# Patient Record
Sex: MALE | Race: WHITE | NOT HISPANIC OR LATINO | Employment: OTHER | URBAN - METROPOLITAN AREA
[De-identification: names, ages, dates, MRNs, and addresses within clinical notes are randomized per-mention and may not be internally consistent; named-entity substitution may affect disease eponyms.]

---

## 2017-01-09 ENCOUNTER — ANTICOAGULATION MONITORING (OUTPATIENT)
Dept: MEDICAL GROUP | Facility: PHYSICIAN GROUP | Age: 62
End: 2017-01-09
Payer: COMMERCIAL

## 2017-01-09 DIAGNOSIS — Z98.890 S/P MITRAL VALVE REPAIR: ICD-10-CM

## 2017-01-09 LAB — INR PPP: 3.6 (ref 2–3.5)

## 2017-01-09 PROCEDURE — 85610 PROTHROMBIN TIME: CPT | Performed by: NURSE PRACTITIONER

## 2017-01-09 PROCEDURE — 99211 OFF/OP EST MAY X REQ PHY/QHP: CPT | Performed by: NURSE PRACTITIONER

## 2017-01-09 NOTE — PROGRESS NOTES
Anticoagulation Summary as of 1/9/2017     INR goal 2.0-3.0   Selected INR 3.6! (1/9/2017)   Maintenance plan 2 mg (1 mg x 2) on Wed; 3 mg (1 mg x 3) all other days   Weekly total 20 mg   Plan last modified Markell Stanford, LEONA (1/9/2017)   Next INR check 1/20/2017   Target end date Indefinite    Indications   CVA (cerebral vascular accident) (HCC) [I63.9]  S/P mitral valve repair [Z98.890]  Transient cerebral ischemia (Resolved) [G45.9]         Anticoagulation Episode Summary     INR check location Home Draw    Preferred lab     Send INR reminders to     Comments       Anticoagulation Care Providers     Provider Role Specialty Phone number    Dudley Warner M.D. Referring Cardiology [phone]    Clau Carrera PHARMD Responsible          Anticoagulation Patient Findings   Negatives Missed Doses, Extra Doses, Medication Changes, Antibiotic Use, Diet Changes, Dental/Other Procedures, Hospitalization, Bleeding Gums, Nose Bleeds, Blood in Urine, Blood in Stool, Any Bruising, Other Complaints        Patient is supratherapeutic today with INR of 3.6.  Pt denies any unusual s/s of bleeding, bruising, clotting or any changes to diet or medications.  Instructed patient to HOLD tomorrows dose (already took today), then decrease weekly warfarin regimen by ~5% as detailed above.  Follow up in 1.5 weeks.    Markell Stanford, INNAD

## 2017-01-09 NOTE — MR AVS SNAPSHOT
Roque Krishnaler   2017 9:45 AM   Anticoagulation Monitoring   MRN: [id number]    Department:  [facility]   Dept Phone:  [phone]    Description:  Male : 1955   Provider:  Markell Stanford PHARMD           Allergies as of 2017     No Known Allergies      You were diagnosed with     S/P mitral valve repair   [[id number]]         Vital Signs     Smoking Status                   Former Smoker           Basic Information     Date Of Birth Sex Race Ethnicity Preferred Language    1955 Male White Non- English      Your appointments     2017  9:45 AM   Anti-Coag Routine with Markell Stanford PHARMD   [facility]    [address] [address] [phone]            2017  8:45 AM   Anti-Coag Routine with [place] PHARMACIST   [facility]    [address] [id number] [phone]            2017  1:45 PM   FOLLOW UP with Dudley Warner M.D.   [facility] (--)    [address], [address]  [facility] [address] [phone]            Mar 22, 2017  1:20 PM   Follow Up Visit with JUSTINA Corcoran   [facility] (--)    [address], [address]  [facility] [address] [phone]           You will be receiving a confirmation call a few days before your appointment from our automated call confirmation system.              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 Medium  10/17/2013 - Present    Essential and other specified forms of tremor G25.0, G25.2 Medium  10/17/2013 - Present    CHF, acute (HCC) I50.9 High  2015 - Present    LBBB (left bundle branch block) I44.7  High  4/20/2015 - Present    Elevated bilirubin  Medium  4/20/2015 - Present    LV dysfunction I51.9 High  4/22/2015 - Present    TIA (transient ischemic attack) G45.9 Medium  4/22/2015 - Present    Asthma J45.909 Medium  4/22/2015 - Present    CHF (congestive heart failure), NYHA class II ([facility]) I50.9   5/15/2015 - Present    CVA (cerebral vascular accident) ([facility]) I63.9   7/28/2015 - Present    Thrombocytopenia (HCC) D69.6   7/28/2015 - Present    S/P mitral valve repair (Chronic) Z98.890   Unknown - Present    Aphasia, late effect of 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   9/3/2015 - Present    Impaired cognition R41.89   9/14/2015 - Present    Chronic anticoagulation (Chronic) Z79.01   Unknown - Present    Obesity (BMI 30-39.9) E66.9   12/14/2016 - Present      Health Maintenance        Date Due Completion Dates    IMM DTaP/Tdap/Td Vaccine (1 - Tdap) 6/7/1973 ---    IMM ZOSTER VACCINE 6/7/2014 ---    COLONOSCOPY 4/1/2024 4/1/2014            Results     POCT Protime      Component    INR    3.6    Comment:     137 037-11 10/2017 ic valid                        Current Immunizations     Influenza TIV (IM) 3/11/2014    Influenza Vaccine Quad Inj (Pf) 12/14/2016    Influenza Vaccine Quad Inj (Preserved) 11/3/2014    Pneumococcal polysaccharide vaccine (PPSV-23) 1/1/2014      Below and/or attached are the medications your provider expects you to take. Review all of your home medications and newly ordered medications with your provider and/or pharmacist. Follow medication instructions as directed by your provider and/or pharmacist. Please keep your medication list with you and share with your provider. Update the information when medications are discontinued, doses are changed, or new medications (including over-the-counter products) are added; and carry medication information at all times in the event of emergency situations     Allergies:  No Known Allergies          Medications  Valid as of: January 09, 2017 -  8:59 AM     Generic Name Brand Name Tablet Size Instructions for use    Calcium Citrate (Tab) CALCITRATE 950 MG Take 950 mg by mouth every day.        Carvedilol (Tab) COREG 12.5 MG Take 1 Tab by mouth 2 times a day, with meals.        Lacosamide (Tab) VIMPAT 200 MG Take 200 mg by mouth 2 Times a Day.        LevETIRAcetam (Tab) KEPPRA 1000 MG Take 2 Tabs by mouth 2 Times a Day.        Lisinopril (Tab) PRINIVIL 10 MG TAKE ONE TABLET BY MOUTH EVERY DAY        Multiple Vitamin (Tab) THERAGRAN  Take 1 Tab by mouth every day.        Warfarin Sodium (Tab) COUMADIN 1 MG Take three to four (3-4) tablets daily as directed by coumadin clinic        .                 Medicines prescribed today were sent to:     [id number] - [place], [place] - [address]    [address] [place] [address]    Phone: [phone] Fax: [phone]    Open 24 Hours?: No      Medication refill instructions:       If your prescription bottle indicates you have medication refills left, it is not necessary to call your provider’s office. Please contact your pharmacy and they will refill your medication.    If your prescription bottle indicates you do not have any refills left, you may request refills at any time through one of the following ways: The online [employer] system (except Urgent Care), by calling your provider’s office, or by asking your pharmacy to contact your provider’s office with a refill request. Medication refills are processed only during regular business hours and may not be available until the next business day. Your provider may request additional information or to have a follow-up visit with you prior to refilling your medication.   *Please Note: Medication refills are assigned a new Rx number when refilled electronically. Your pharmacy may indicate that no refills were authorized even though a new prescription for the same medication is available at the pharmacy. Please request the medicine by name with the pharmacy before  contacting your provider for a refill.        Warfarin Dosing Calendar   January 2017 Details    Sun Mon Tue Wed Thu Fri Sat     1               2               3               4               5               6               7                 8               9   3.6   3 mg   See details      10      Hold         11      2 mg         12      3 mg         13      3 mg         14      3 mg           15      3 mg         16      3 mg         17      3 mg         18      2 mg         19      3 mg         20      3 mg         21                 22               23               24               25               26               27               28                 29               30               31                    Date Details   01/09 This INR check   INR: 3.6   137 037-11 10/2017 ic valid       Date of next INR:  1/20/2017         How to take your warfarin dose     To take:  2 mg Take 2 of the 1 mg tablets.    To take:  3 mg Take 3 of the 1 mg tablets.    Hold Do not take your warfarin dose. See the Details table to the right for additional instructions.                   Ye[employer] Mobile Access Code: [email or web address]  Expires: 1/12/2017  3:56 PM    Ye[employer] Mobile  A secure, online tool to manage your health information     [employer]’s Yeeply Mobile® is a secure, online tool that connects you to your personalized health information from the privacy of your home -- day or night - making it very easy for you to manage your healthcare. Once the activation process is completed, you can even access your medical information using the Yeep[employer] Mobile juan j, which is available for free in the Apple Juan J store or Google Play store.     [employer] Mobile provides the following levels of access (as shown below):   My Chart Features   [facility] Doctor [facility]  Specialists [facility]  Urgent  Care Non-[facility]  Primary Care  Doctor   Email your healthcare team securely and privately 24/7 X X X    Manage appointments: schedule your next appointment; view details of  past/upcoming appointments X      Request prescription refills. X      View recent personal medical records, including lab and immunizations X X X X   View health record, including health history, allergies, medications X X X X   Read reports about your outpatient visits, procedures, consult and ER notes X X X X   See your discharge summary, which is a recap of your hospital and/or ER visit that includes your diagnosis, lab results, and care plan. X X       How to register for [employer]:  1. Go to  [email or web address].  2. Click on the Sign Up Now box, which takes you to the New Member Sign Up page. You will need to provide the following information:  a. Enter your [employer] Access Code exactly as it appears at the top of this page. (You will not need to use this code after you’ve completed the sign-up process. If you do not sign up before the expiration date, you must request a new code.)   b. Enter your date of birth.   c. Enter your home email address.   d. Click Submit, and follow the next screen’s instructions.  3. Create a Metconnex ID. This will be your Met[employer]x login ID and cannot be changed, so think of one that is secure and easy to remember.  4. Create a [employer] password. You can change your password at any time.  5. Enter your Password Reset Question and Answer. This can be used at a later time if you forget your password.   6. Enter your e-mail address. This allows you to receive e-mail notifications when new information is available in [employer].  7. Click Sign Up. You can now view your health information.    For assistance activating your [employer] account, call [phone]

## 2017-01-20 ENCOUNTER — ANTICOAGULATION VISIT (OUTPATIENT)
Dept: MEDICAL GROUP | Facility: PHYSICIAN GROUP | Age: 62
End: 2017-01-20
Payer: COMMERCIAL

## 2017-01-20 DIAGNOSIS — Z98.890 S/P MITRAL VALVE REPAIR: ICD-10-CM

## 2017-01-20 LAB — INR PPP: 2.2 (ref 2–3.5)

## 2017-01-20 PROCEDURE — 85610 PROTHROMBIN TIME: CPT | Performed by: FAMILY MEDICINE

## 2017-01-20 PROCEDURE — 99211 OFF/OP EST MAY X REQ PHY/QHP: CPT | Performed by: FAMILY MEDICINE

## 2017-01-20 NOTE — MR AVS SNAPSHOT
Roque Nguyen   2017 8:45 AM   Anticoagulation Visit   MRN: [id number]    Department:  [facility]   Dept Phone:  [phone]    Description:  Male : 1955   Provider:  Markell Stanford PHARMD           Allergies as of 2017     No Known Allergies      You were diagnosed with     S/P mitral valve repair   [[id number]]         Vital Signs     Smoking Status                   Former Smoker           Basic Information     Date Of Birth Sex Race Ethnicity Preferred Language    1955 Male White Non- English      Your appointments     2017  8:45 AM   Anti-Coag Routine with Markell Stanford PHARMD   [facility]    [address] [facility] [id number] [phone]            2017  1:45 PM   FOLLOW UP with Dudley Warner M.D.   [facility] (--)    [address], [address]  [facility] [id number] [phone]            2017  8:30 AM   Anti-Coag Routine with [place] PHARMACIST   [facility]    [address] [id number] [phone]            Mar 22, 2017  1:20 PM   Follow Up Visit with JUSTINA Corcoran   [facility] (--)    [address], [address]  [facility] [id number] [phone]           You will be receiving a confirmation call a few days before your appointment from our automated call confirmation system.              Problem List              ICD-10-CM Priority Class Noted - Resolved    Antiphospholipid syndrome (CMS-HCC) D68.61 Medium  3/15/2010 - Present    At risk for osteopenia Z91.89 Low  10/17/2013 - Present    Partial epilepsy with impairment of consciousness (CMS-HCC) G40.209 Medium  10/17/2013 - Present    Essential and other specified forms of tremor G25.0, G25.2 Medium  10/17/2013 - Present    CHF, acute (CMS-[facility]) I50.9 High  2015 - Present    LBBB (left bundle branch block)  I44.7 High  4/20/2015 - Present    Elevated bilirubin  Medium  4/20/2015 - Present    LV dysfunction I51.9 High  4/22/2015 - Present    TIA (transient ischemic attack) G45.9 Medium  4/22/2015 - Present    Asthma J45.909 Medium  4/22/2015 - Present    CHF (congestive heart failure), NYHA class II (CMS-HCC) I50.9   5/15/2015 - Present    CVA (cerebral vascular accident) (CMS-HCC) I63.9   7/28/2015 - Present    Thrombocytopenia (CMS-HCC) D69.6   7/28/2015 - Present    S/P mitral valve repair (Chronic) Z98.890   Unknown - Present    Aphasia, late effect of 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   9/3/2015 - Present    Impaired cognition R41.89   9/14/2015 - Present    Chronic anticoagulation (Chronic) Z79.01   Unknown - Present    Obesity (BMI 30-39.9) E66.9   12/14/2016 - Present      Health Maintenance        Date Due Completion Dates    IMM DTaP/Tdap/Td Vaccine (1 - Tdap) 6/7/1973 ---    IMM ZOSTER VACCINE 6/7/2014 ---    COLONOSCOPY 4/1/2024 4/1/2014            Results     POCT Protime      Component    INR    2.2    Comment:     139 818-12 11/2017 ic valid                        Current Immunizations     Influenza TIV (IM) 3/11/2014    Influenza Vaccine Quad Inj (Pf) 12/14/2016    Influenza Vaccine Quad Inj (Preserved) 11/3/2014    Pneumococcal polysaccharide vaccine (PPSV-23) 1/1/2014      Below and/or attached are the medications your provider expects you to take. Review all of your home medications and newly ordered medications with your provider and/or pharmacist. Follow medication instructions as directed by your provider and/or pharmacist. Please keep your medication list with you and share with your provider. Update the information when medications are discontinued, doses are changed, or new medications (including over-the-counter products) are added; and carry medication information at all times in the event of emergency situations     Allergies:  No Known Allergies          Medications  Valid as of: January 20,  2017 -  8:27 AM    Generic Name Brand Name Tablet Size Instructions for use    Calcium Citrate (Tab) CALCITRATE 950 MG Take 950 mg by mouth every day.        Carvedilol (Tab) COREG 12.5 MG Take 1 Tab by mouth 2 times a day, with meals.        Lacosamide (Tab) VIMPAT 200 MG Take 200 mg by mouth 2 Times a Day.        LevETIRAcetam (Tab) KEPPRA 1000 MG Take 2 Tabs by mouth 2 Times a Day.        Lisinopril (Tab) PRINIVIL 10 MG TAKE ONE TABLET BY MOUTH EVERY DAY        Multiple Vitamin (Tab) THERAGRAN  Take 1 Tab by mouth every day.        Warfarin Sodium (Tab) COUMADIN 1 MG Take three to four (3-4) tablets daily as directed by coumadin clinic        .                 Medicines prescribed today were sent to:     [id number] - [place], [place] - [address]    [address] [facility] [place] [address]    Phone: [phone] Fax: [phone]    Open 24 Hours?: No      Medication refill instructions:       If your prescription bottle indicates you have medication refills left, it is not necessary to call your provider’s office. Please contact your pharmacy and they will refill your medication.    If your prescription bottle indicates you do not have any refills left, you may request refills at any time through one of the following ways: The online [employer] system (except Urgent Care), by calling your provider’s office, or by asking your pharmacy to contact your provider’s office with a refill request. Medication refills are processed only during regular business hours and may not be available until the next business day. Your provider may request additional information or to have a follow-up visit with you prior to refilling your medication.   *Please Note: Medication refills are assigned a new Rx number when refilled electronically. Your pharmacy may indicate that no refills were authorized even though a new prescription for the same medication is available at the pharmacy. Please request the medicine by name with the  pharmacy before contacting your provider for a refill.        Warfarin Dosing Calendar   January 2017 Details    Sun Mon Tue Wed Thu Fri Sat     1               2               3               4               5               6               7                 8               9               10               11               12               13               14                 15               16               17               18               19               20   2.2   3 mg   See details      21      3 mg           22      3 mg         23      3 mg         24      3 mg         25      2 mg         26      3 mg         27      3 mg         28      3 mg           29      3 mg         30      3 mg         31      3 mg              Date Details   01/20 This INR check   INR: 2.2   139 818-12 11/2017 ic valid               How to take your warfarin dose     To take:  2 mg Take 2 of the 1 mg tablets.    To take:  3 mg Take 3 of the 1 mg tablets.           Warfarin Dosing Calendar   February 2017 Details    Sun Mon Tue Wed Thu Fri Sat        1      2 mg         2      3 mg         3      3 mg         4      3 mg           5      3 mg         6      3 mg         7      3 mg         8      2 mg         9      3 mg         10      3 mg         11      3 mg           12      3 mg         13      3 mg         14      3 mg         15      2 mg         16      3 mg         17      3 mg         18                 19               20               21               22               23               24               25                 26               27               28                    Date Details   No additional details    Date of next INR:  2/17/2017         How to take your warfarin dose     To take:  2 mg Take 2 of the 1 mg tablets.    To take:  3 mg Take 3 of the 1 mg tablets.              [employer] Access Code: [email or web address]  Expires: 2/19/2017  8:27 AM    [employer]  A secure, online tool to manage your health information        [employer]’s Ruci.cn® is a secure, online tool that connects you to your personalized health information from the privacy of your home -- day or night - making it very easy for you to manage your healthcare. Once the activation process is completed, you can even access your medical information using the Ruci.cn juan j, which is available for free in the Apple Juan J store or Google Play store.     Ruci.cn provides the following levels of access (as shown below):   My Chart Features   [facility] Primary [facility] Doctor [facility]  Specialists [facility]  Urgent  Care Non-[facility]  Primary Care  Doctor   Email your healthcare team securely and privately 24/7 X X X    Manage appointments: schedule your next appointment; view details of past/upcoming appointments X      Request prescription refills. X      View recent personal medical records, including lab and immunizations X X X X   View health record, including health history, allergies, medications X X X X   Read reports about your outpatient visits, procedures, consult and ER notes X X X X   See your discharge summary, which is a recap of your hospital and/or ER visit that includes your diagnosis, lab results, and care plan. X X       How to register for [email or web address].cn:  1. Go to  [email or web address].  2. Click on the Sign Up Now box, which takes you to the New Member Sign Up page. You will need to provide the following information:  a. Enter your R[employer]cn Access Code exactly as it appears at the top of this page. (You will not need to use this code after you’ve completed the sign-up process. If you do not sign up before the expiration date, you must request a new code.)   b. Enter your date of birth.   c. Enter your home email address.   d. Click Submit, and follow the next screen’s instructions.  3. Create a Ruci.cn ID. This will be your R[employer]cn login ID and cannot be changed, so think of one that is secure and easy to remember.  4. Create a [employer]cn password. You can change your  password at any time.  5. Enter your Password Reset Question and Answer. This can be used at a later time if you forget your password.   6. Enter your e-mail address. This allows you to receive e-mail notifications when new information is available in [employer].  7. Click Sign Up. You can now view your health information.    For assistance activating your [employer] account, call [phone]

## 2017-01-20 NOTE — PROGRESS NOTES
Anticoagulation Summary as of 1/20/2017     INR goal 2.0-3.0   Selected INR 2.2 (1/20/2017)   Maintenance plan 2 mg (1 mg x 2) on Wed; 3 mg (1 mg x 3) all other days   Weekly total 20 mg   Plan last modified Markell Stanford PHARMD (1/9/2017)   Next INR check 2/17/2017   Target end date Indefinite    Indications   CVA (cerebral vascular accident) (CMS-HCC) [I63.9]  S/P mitral valve repair [Z98.890]  Transient cerebral ischemia (Resolved) [G45.9]         Anticoagulation Episode Summary     INR check location Home Draw    Preferred lab     Send INR reminders to     Comments       Anticoagulation Care Providers     Provider Role Specialty Phone number    Dudley Warner M.D. Referring Cardiology [phone]    Clau Carrera PHARMD Responsible          Anticoagulation Patient Findings   Negatives Missed Doses, Extra Doses, Medication Changes, Antibiotic Use, Diet Changes, Dental/Other Procedures, Hospitalization, Bleeding Gums, Nose Bleeds, Blood in Urine, Blood in Stool, Any Bruising, Other Complaints        Patient is therapeutic today with INR of 2.2.  Pt denies any unusual s/s of bleeding, bruising, clotting or any changes to diet or medications.  Pt is to continue with current warfarin dosing regimen.  Follow up in 4 weeks.    Markell Stanford, LEONA

## 2017-02-07 ENCOUNTER — OFFICE VISIT (OUTPATIENT)
Dept: CARDIOLOGY | Facility: MEDICAL CENTER | Age: 62
End: 2017-02-07
Payer: COMMERCIAL

## 2017-02-07 VITALS
SYSTOLIC BLOOD PRESSURE: 100 MMHG | HEART RATE: 90 BPM | BODY MASS INDEX: 31.82 KG/M2 | DIASTOLIC BLOOD PRESSURE: 70 MMHG | WEIGHT: 198 LBS | OXYGEN SATURATION: 96 % | HEIGHT: 66 IN

## 2017-02-07 DIAGNOSIS — I50.9 CONGESTIVE HEART FAILURE, UNSPECIFIED CONGESTIVE HEART FAILURE CHRONICITY, UNSPECIFIED CONGESTIVE HEART FAILURE TYPE: ICD-10-CM

## 2017-02-07 DIAGNOSIS — I51.9 LV DYSFUNCTION: ICD-10-CM

## 2017-02-07 DIAGNOSIS — I69.920 APHASIA, LATE EFFECT OF CEREBROVASCULAR DISEASE: ICD-10-CM

## 2017-02-07 DIAGNOSIS — Z79.01 CHRONIC ANTICOAGULATION: Chronic | ICD-10-CM

## 2017-02-07 DIAGNOSIS — I50.22 CHF (CONGESTIVE HEART FAILURE), NYHA CLASS II, CHRONIC, SYSTOLIC (HCC): ICD-10-CM

## 2017-02-07 DIAGNOSIS — D68.61 ANTIPHOSPHOLIPID SYNDROME (HCC): ICD-10-CM

## 2017-02-07 PROCEDURE — 99214 OFFICE O/P EST MOD 30 MIN: CPT | Performed by: INTERNAL MEDICINE

## 2017-02-07 RX ORDER — LISINOPRIL 10 MG/1
10 TABLET ORAL
Qty: 90 TAB | Refills: 3 | Status: SHIPPED | OUTPATIENT
Start: 2017-02-07 | End: 2018-02-14 | Stop reason: SDUPTHER

## 2017-02-07 RX ORDER — CARVEDILOL 12.5 MG/1
12.5 TABLET ORAL 2 TIMES DAILY WITH MEALS
Qty: 180 TAB | Refills: 3 | Status: SHIPPED | OUTPATIENT
Start: 2017-02-07 | End: 2018-02-14 | Stop reason: SDUPTHER

## 2017-02-07 NOTE — MR AVS SNAPSHOT
"        Roque Nguyen   2017 1:45 PM   Office Visit   MRN: [id number]    Department:  [facility]   Dept Phone:  [phone]    Description:  Male : 1955   Provider:  Dudley Warner M.D.           Reason for Visit     Follow-Up           Allergies as of 2017     No Known Allergies      You were diagnosed with     Aphasia, late effect of cerebrovascular disease   [438.11.ICD-9-CM]       Antiphospholipid syndrome (CMS-[facility])   [254918]       CHF (congestive heart failure), NYHA class II, chronic, systolic (CMS-[facility])   [8714440]       Congestive heart failure, unspecified congestive heart failure chronicity, unspecified congestive heart failure type (CMS-[facility])   [0981534]       LV dysfunction   [326781]         Vital Signs     Blood Pressure Pulse Height Weight Body Mass Index Oxygen Saturation    100/70 mmHg 90 1.676 m (5' 5.98\") 89.812 kg (198 lb) 31.97 kg/m2 96%    Smoking Status                   Former Smoker           Basic Information     Date Of Birth Sex Race Ethnicity Preferred Language    1955 Male White Non- English      Your appointments     2017  8:30 AM   Anti-Coag Routine with [place] PHARMACIST   [facility]    [address] [place] [id number] [phone]            Mar 22, 2017  1:20 PM   Follow Up Visit with JUSTINA Corcoran   [facility] (--)    [address] [place] [address], [address]  [facility] [address] [phone]           You will be receiving a confirmation call a few days before your appointment from our automated call confirmation system.              Problem List              ICD-10-CM Priority Class Noted - Resolved    Antiphospholipid syndrome (CMS-HCC) D68.61 Medium  3/15/2010 - Present    At risk for osteopenia Z91.89 Low  10/17/2013 - Present    Partial epilepsy with impairment of consciousness (CMS-HCC) G40.209 Medium  10/17/2013 - Present    Essential and other specified " forms of tremor G25.0, G25.2 Medium  10/17/2013 - Present    CHF, acute (CMS-HCC) I50.9 High  4/20/2015 - Present    LBBB (left bundle branch block) I44.7 High  4/20/2015 - Present    Elevated bilirubin  Medium  4/20/2015 - Present    LV dysfunction I51.9 High  4/22/2015 - Present    TIA (transient ischemic attack) G45.9 Medium  4/22/2015 - Present    Asthma J45.909 Medium  4/22/2015 - Present    CHF (congestive heart failure), NYHA class II (CMS-HCC) I50.9   5/15/2015 - Present    CVA (cerebral vascular accident) (CMS-HCC) I63.9   7/28/2015 - Present    Thrombocytopenia (CMS-HCC) D69.6   7/28/2015 - Present    S/P mitral valve repair (Chronic) Z98.890   Unknown - Present    Aphasia, late effect of 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   9/3/2015 - Present    Impaired cognition R41.89   9/14/2015 - Present    Chronic anticoagulation (Chronic) Z79.01   Unknown - Present    Obesity (BMI 30-39.9) E66.9   12/14/2016 - Present      Health Maintenance        Date Due Completion Dates    IMM DTaP/Tdap/Td Vaccine (1 - Tdap) 6/7/1973 ---    IMM ZOSTER VACCINE 6/7/2014 ---    COLONOSCOPY 4/1/2024 4/1/2014            Current Immunizations     Influenza TIV (IM) 3/11/2014    Influenza Vaccine Quad Inj (Pf) 12/14/2016    Influenza Vaccine Quad Inj (Preserved) 11/3/2014    Pneumococcal polysaccharide vaccine (PPSV-23) 1/1/2014      Below and/or attached are the medications your provider expects you to take. Review all of your home medications and newly ordered medications with your provider and/or pharmacist. Follow medication instructions as directed by your provider and/or pharmacist. Please keep your medication list with you and share with your provider. Update the information when medications are discontinued, doses are changed, or new medications (including over-the-counter products) are added; and carry medication information at all times in the event of emergency situations     Allergies:  No Known Allergies          Medications   Valid as of: February 07, 2017 -  2:37 PM    Generic Name Brand Name Tablet Size Instructions for use    Calcium Citrate (Tab) CALCITRATE 950 MG Take 950 mg by mouth every day.        Carvedilol (Tab) COREG 12.5 MG Take 1 Tab by mouth 2 times a day, with meals.        Lacosamide (Tab) VIMPAT 200 MG Take 200 mg by mouth 2 Times a Day.        LevETIRAcetam (Tab) KEPPRA 1000 MG Take 2 Tabs by mouth 2 Times a Day.        Lisinopril (Tab) PRINIVIL 10 MG Take 1 Tab by mouth every day.        Multiple Vitamin (Tab) THERAGRAN  Take 1 Tab by mouth every day.        Warfarin Sodium (Tab) COUMADIN 1 MG Take three to four (3-4) tablets daily as directed by coumadin clinic        .                 Medicines prescribed today were sent to:     [id number] - [place], [place] - [address]    [address] [place] [address]    Phone: [phone] Fax: [phone]    Open 24 Hours?: No      Medication refill instructions:       If your prescription bottle indicates you have medication refills left, it is not necessary to call your provider’s office. Please contact your pharmacy and they will refill your medication.    If your prescription bottle indicates you do not have any refills left, you may request refills at any time through one of the following ways: The online [employer] system (except Urgent Care), by calling your provider’s office, or by asking your pharmacy to contact your provider’s office with a refill request. Medication refills are processed only during regular business hours and may not be available until the next business day. Your provider may request additional information or to have a follow-up visit with you prior to refilling your medication.   *Please Note: Medication refills are assigned a new Rx number when refilled electronically. Your pharmacy may indicate that no refills were authorized even though a new prescription for the same medication is available at the pharmacy. Please request the  medicine by name with the pharmacy before contacting your provider for a refill.           [employer] Access Code: [email or web address]  Expires: 2/19/2017  8:27 AM    [employer]  A secure, online tool to manage your health information     [employer]’s [employer]® is a secure, online tool that connects you to your personalized health information from the privacy of your home -- day or night - making it very easy for you to manage your healthcare. Once the activation process is completed, you can even access your medical information using the [employer] juan j, which is available for free in the Apple Juan J store or Google Play store.     [employer] provides the following levels of access (as shown below):   My Chart Features   [facility] Primary Care Doctor [facility]  Specialists [facility]  Urgent  Care Non-[facility]  Primary Care  Doctor   Email your healthcare team securely and privately 24/7 X X X    Manage appointments: schedule your next appointment; view details of past/upcoming appointments X      Request prescription refills. X      View recent personal medical records, including lab and immunizations X X X X   View health record, including health history, allergies, medications X X X X   Read reports about your outpatient visits, procedures, consult and ER notes X X X X   See your discharge summary, which is a recap of your hospital and/or ER visit that includes your diagnosis, lab results, and care plan. X X       How to register for [employer]:  1. Go to  [email or web address].  2. Click on the Sign Up Now box, which takes you to the New Member Sign Up page. You will need to provide the following information:  a. Enter your [employer] Access Code exactly as it appears at the top of this page. (You will not need to use this code after you’ve completed the sign-up process. If you do not sign up before the expiration date, you must request a new code.)   b. Enter your date of birth.   c. Enter your home email address.   d. Click Submit, and  follow the next screen’s instructions.  3. Create a [employer] ID. This will be your [employer] login ID and cannot be changed, so think of one that is secure and easy to remember.  4. Create a [employer] password. You can change your password at any time.  5. Enter your Password Reset Question and Answer. This can be used at a later time if you forget your password.   6. Enter your e-mail address. This allows you to receive e-mail notifications when new information is available in [employer].  7. Click Sign Up. You can now view your health information.    For assistance activating your [employer] account, call [phone]

## 2017-02-15 ENCOUNTER — TELEPHONE (OUTPATIENT)
Dept: NEUROLOGY | Facility: MEDICAL CENTER | Age: 62
End: 2017-02-15

## 2017-02-15 NOTE — TELEPHONE ENCOUNTER
Go ahead taking current dosing of Keppra as long as he is feeling well.  How has this differentiation been noticed?

## 2017-02-15 NOTE — TELEPHONE ENCOUNTER
Called and spoke with pt. All information was given and pt and she verbally understood and will comply with all given. BLR  No seizures and patient has been feeling great!

## 2017-02-15 NOTE — TELEPHONE ENCOUNTER
Message: Patient has been taking LEV 1000mg BID. Our notes states that the patient is supposed to take LEV 2000mg BID. Should the patient continue 1000mg BID or should he increase to 2000mg BID?    Caller: Gabi  Call Back #: [phone]  OK to leave detailed message: yes

## 2017-02-16 ASSESSMENT — ENCOUNTER SYMPTOMS
HEADACHES: 0
PALPITATIONS: 0
CHILLS: 0
NAUSEA: 0
BRUISES/BLEEDS EASILY: 0
COUGH: 0
PND: 0
FOCAL WEAKNESS: 0
SORE THROAT: 0
DIZZINESS: 0
BLURRED VISION: 0
WEAKNESS: 0
FEVER: 0
CLAUDICATION: 0
SHORTNESS OF BREATH: 0
ABDOMINAL PAIN: 0
FALLS: 0
LOSS OF CONSCIOUSNESS: 0

## 2017-02-17 ENCOUNTER — ANTICOAGULATION VISIT (OUTPATIENT)
Dept: MEDICAL GROUP | Facility: PHYSICIAN GROUP | Age: 62
End: 2017-02-17
Payer: COMMERCIAL

## 2017-02-17 DIAGNOSIS — I63.9 CEREBROVASCULAR ACCIDENT (CVA), UNSPECIFIED MECHANISM (HCC): ICD-10-CM

## 2017-02-17 DIAGNOSIS — Z98.890 S/P MITRAL VALVE REPAIR: ICD-10-CM

## 2017-02-17 LAB — INR PPP: 2.1 (ref 2–3.5)

## 2017-02-17 PROCEDURE — 99211 OFF/OP EST MAY X REQ PHY/QHP: CPT | Performed by: FAMILY MEDICINE

## 2017-02-17 PROCEDURE — 85610 PROTHROMBIN TIME: CPT | Performed by: FAMILY MEDICINE

## 2017-02-17 NOTE — PROGRESS NOTES
Anticoagulation Summary as of 2/17/2017     INR goal 2.0-3.0   Selected INR 2.1 (2/17/2017)   Maintenance plan 2 mg (1 mg x 2) on Wed; 3 mg (1 mg x 3) all other days   Weekly total 20 mg   Plan last modified Markell Stanford PHARMD (1/9/2017)   Next INR check 3/31/2017   Target end date Indefinite    Indications   CVA (cerebral vascular accident) (CMS-HCC) [I63.9]  S/P mitral valve repair [Z98.890]  Transient cerebral ischemia (Resolved) [G45.9]         Anticoagulation Episode Summary     INR check location Home Draw    Preferred lab     Send INR reminders to     Comments       Anticoagulation Care Providers     Provider Role Specialty Phone number    Dudley Warner M.D. Referring Cardiology [phone]    Clau Carrera PHARMD Responsible          Anticoagulation Patient Findings   Negatives Missed Doses, Extra Doses, Medication Changes, Antibiotic Use, Diet Changes, Dental/Other Procedures, Hospitalization, Bleeding Gums, Nose Bleeds, Blood in Urine, Blood in Stool, Any Bruising, Other Complaints        Patient is therapeutic today with INR of 2.1.  Pt denies any unusual s/s of bleeding, bruising, clotting or any changes to diet or medications.  Pt is to continue with current warfarin dosing regimen.  Follow up in 6 weeks.    Markell Stanford, LEONA

## 2017-02-17 NOTE — PROGRESS NOTES
Subjective:   Roque Nguyen is a 61 y.o. male who presents today for follow-up of his history of mitral valve repair which was complicated by postoperative stroke with antiphospholipid antibody and thrombocytopenia    He's been doing well    Unfortunately his wife was found to have colorectal cancer and has had quite a bit of surgeries but certainly his cause distress she is normally his [place] for his own health and so has been difficult to see her go through all the issues she has    Past Medical History   Diagnosis Date   • Antiphospholipid antibody syndrome (CMS-HCC) 2007     Followed by rheumatology   • TIA (transient ischemic attack) 2007/2008     Antiphospholipid antibody   • Dental disorder      Broken   • ASTHMA    • Clotting disorder (CMS-HCC)    • LV dysfunction April 2015     Echocardiogram with normal LV size, LVEF 40%. Severely dilated LA. Severe MR, mild TR. RVSP 45mmHg   • LBBB (left bundle branch block)    • Chest pain April 2015     Coronary angiogram with 20% stenosis of proximal RCA, otherwise normal coronary arteries; LVEF 35-40%.   • Mitral regurgitation April 2015     Echocardiogram with severe MR   • Tremors of nervous system      mostly left sided   • Breath shortness 4/21/15     being worked up for cardiac issue, pt states not an issue right now   • Snoring    • Seizure disorder (CMS-HCC) 2007     Initial diagnosis, last seizure in 2012; followed by neurology, on Keppra.   • TIA 2007/2008   • Personal history of venous thrombosis and embolism      anti phosphorus lipid syndrome.   • Cancer (CMS-HCC) 2014     Prostate cancer   • Hypertension      On Lisinopril   • S/P mitral valve repair    • Chronic anticoagulation      Past Surgical History   Procedure Laterality Date   • Inguinal hernia repair  4/20/2010     Performed by ROQUE STILL at SURGERY SAME DAY [facility] ORS   • Cholecystectomy     • Recovery  5/15/2015     Procedure: [id number]  MITRAL REGURGITATION;   Surgeon: Ir-Recovery Surgery;  Location: SURGERY SAME DAY [facility] ORS;  Service:    • Mitral valve repair  7/20/2015     Procedure: MITRAL VALVE REPAIR  with MICHAEL;  Surgeon: Gina Sim M.D.;  Location: SURGERY [facility];  Service:      Family History   Problem Relation Age of Onset   • Cancer Paternal Uncle    • Stroke Neg Hx    • Lung Disease Mother    • Heart Disease Mother    • Hypertension Mother    • Diabetes Father    • Heart Disease Father    • Psychiatry Sister    • Hypertension Sister    • Lung Disease Paternal Aunt    • Hypertension Paternal Aunt    • Psychiatry Maternal Grandfather      History   Smoking status   • Former Smoker -- 0.50 packs/day   • Types: Cigarettes   • Start date: 07/29/1985   • Quit date: 07/29/2012   Smokeless tobacco   • Never Used     Comment: 1 pk a day for 20 plus yrs     No Known Allergies  Outpatient Encounter Prescriptions as of 2/7/2017   Medication Sig Dispense Refill   • lisinopril (PRINIVIL) 10 MG Tab Take 1 Tab by mouth every day. 90 Tab 3   • carvedilol (COREG) 12.5 MG Tab Take 1 Tab by mouth 2 times a day, with meals. 180 Tab 3   • warfarin (COUMADIN) 1 MG Tab Take three to four (3-4) tablets daily as directed by coumadin clinic 360 Tab 1   • levetiracetam (KEPPRA) 1000 MG tablet Take 2 Tabs by mouth 2 Times a Day. 120 Tab 11   • lacosamide (VIMPAT) 200 MG Tab tablet Take 200 mg by mouth 2 Times a Day. 60 Tab 11   • multivitamin (THERAGRAN) Tab Take 1 Tab by mouth every day.     • calcium citrate (CALCITRATE) 950 MG Tab Take 950 mg by mouth every day.     • [DISCONTINUED] lisinopril (PRINIVIL) 10 MG Tab TAKE ONE TABLET BY MOUTH EVERY DAY 90 Tab 1   • [DISCONTINUED] carvedilol (COREG) 12.5 MG Tab Take 1 Tab by mouth 2 times a day, with meals. 180 Tab 3     No facility-administered encounter medications on file as of 2/7/2017.     Review of Systems   Constitutional: Negative for fever and chills.   HENT: Negative for sore throat.    Eyes: Negative for blurred  "vision.   Respiratory: Negative for cough and shortness of breath.    Cardiovascular: Negative for chest pain, palpitations, claudication, leg swelling and PND.   Gastrointestinal: Negative for nausea and abdominal pain.   Musculoskeletal: Negative for falls.   Skin: Negative for rash.   Neurological: Negative for dizziness, focal weakness, loss of consciousness, weakness and headaches.   Endo/Heme/Allergies: Does not bruise/bleed easily.        Objective:   /70 mmHg  Pulse 90  Ht 1.676 m (5' 5.98\")  Wt 89.812 kg (198 lb)  BMI 31.97 kg/m2  SpO2 96%    Physical Exam   Constitutional: No distress.   HENT:   Mouth/Throat: Oropharynx is clear and moist.   Eyes: No scleral icterus.   Neck: Neck supple. No JVD present.   Cardiovascular: Normal rate, regular rhythm, normal heart sounds and intact distal pulses.  Exam reveals no gallop and no friction rub.    No murmur heard.  Pulmonary/Chest: Effort normal. He has no rales.   Abdominal: Soft. Bowel sounds are normal. There is no tenderness.   Musculoskeletal: He exhibits no edema.   Neurological: He is alert.   Skin: No rash noted. He is not diaphoretic.   Psychiatric: He has a normal mood and affect.       Assessment:     1. Aphasia, late effect of cerebrovascular disease     2. Antiphospholipid syndrome (CMS-[facility])     3. CHF (congestive heart failure), NYHA class II, chronic, systolic (CMS-[facility])     4. Congestive heart failure, unspecified congestive heart failure chronicity, unspecified congestive heart failure type (CMS-[facility])  lisinopril (PRINIVIL) 10 MG Tab   5. LV dysfunction  carvedilol (COREG) 12.5 MG Tab   6. Chronic anticoagulation         Medical Decision Making:  Today's Assessment / Status / Plan:     It was my pleasure to meet with Mr. Nguyen.    He is doing well on chronic anticoagulations    Blood pressure remains excellent    Overall his energy has remained stable after mitral valve surgery    I will see Mr. Nguyen back in 1 year time and " encouraged him to follow up with us over the phone or e-mail using my MyChart as issues arise.    It is my pleasure to participate in the care of Mr. Nguyen.  Please do not hesitate to contact me with questions or concerns.    Dudley Warner MD PhD FAC  Cardiologist [facility]

## 2017-02-17 NOTE — MR AVS SNAPSHOT
Roque Nguyen   2017 8:30 AM   Anticoagulation Visit   MRN: [id number]    Department:  [facility]   Dept Phone:  [phone]    Description:  Male : 1955   Provider:  Markell Stanford PHARMD           Allergies as of 2017     No Known Allergies      You were diagnosed with     S/P mitral valve repair   [340865]       Cerebrovascular accident (CVA), unspecified mechanism (CMS-[facility])   [[id number]]         Vital Signs     Smoking Status                   Former Smoker           Basic Information     Date Of Birth Sex Race Ethnicity Preferred Language    1955 Male White Non- English      Your appointments     2017  8:30 AM   Anti-Coag Routine with Markell Stanford PHARMD   [address] [phone]            Mar 22, 2017  1:20 PM   Follow Up Visit with JUSTINA Corcoran   [facility] (--)    [address] [place] [address], [address]  [facility] [id number] [phone]           You will be receiving a confirmation call a few days before your appointment from our automated call confirmation system.            Mar 31, 2017  8:45 AM   Anti-Coag Routine with [place] PHARMACIST   [facility] [place] [phone]              Problem List              ICD-10-CM Priority Class Noted - Resolved    Antiphospholipid syndrome (CMS-HCC) D68.61 Medium  3/15/2010 - Present    At risk for osteopenia Z91.89 Low  10/17/2013 - Present    Partial epilepsy with impairment of consciousness (CMS-HCC) G40.209 Medium  10/17/2013 - Present    Essential and other specified forms of tremor G25.0, G25.2 Medium  10/17/2013 - Present    LBBB (left bundle branch block) I44.7 High  2015 - Present    Elevated bilirubin  Medium  2015 - Present    LV dysfunction I51.9 High  2015 - Present    TIA (transient ischemic attack)  G45.9 Medium  4/22/2015 - Present    Asthma J45.909 Medium  4/22/2015 - Present    CHF (congestive heart failure), NYHA class II (CMS-HCC) I50.9   5/15/2015 - Present    CVA (cerebral vascular accident) (CMS-HCC) I63.9   7/28/2015 - Present    Thrombocytopenia (CMS-HCC) D69.6   7/28/2015 - Present    S/P mitral valve repair (Chronic) Z98.890   Unknown - Present    Aphasia, late effect of 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   9/3/2015 - Present    Impaired cognition R41.89   9/14/2015 - Present    Chronic anticoagulation (Chronic) Z79.01   Unknown - Present    Obesity (BMI 30-39.9) E66.9   12/14/2016 - Present      Health Maintenance        Date Due Completion Dates    IMM DTaP/Tdap/Td Vaccine (1 - Tdap) 6/7/1973 ---    IMM ZOSTER VACCINE 6/7/2014 ---    COLONOSCOPY 4/1/2024 4/1/2014            Results     POCT Protime      Component    INR    2.1    Comment:     144 580-11 1/2018 ic valid                        Current Immunizations     Influenza TIV (IM) 3/11/2014    Influenza Vaccine Quad Inj (Pf) 12/14/2016    Influenza Vaccine Quad Inj (Preserved) 11/3/2014    Pneumococcal polysaccharide vaccine (PPSV-23) 1/1/2014      Below and/or attached are the medications your provider expects you to take. Review all of your home medications and newly ordered medications with your provider and/or pharmacist. Follow medication instructions as directed by your provider and/or pharmacist. Please keep your medication list with you and share with your provider. Update the information when medications are discontinued, doses are changed, or new medications (including over-the-counter products) are added; and carry medication information at all times in the event of emergency situations     Allergies:  No Known Allergies          Medications  Valid as of: February 17, 2017 -  8:27 AM    Generic Name Brand Name Tablet Size Instructions for use    Calcium Citrate (Tab) CALCITRATE 950 MG Take 950 mg by mouth every day.        Carvedilol  (Tab) COREG 12.5 MG Take 1 Tab by mouth 2 times a day, with meals.        Lacosamide (Tab) VIMPAT 200 MG Take 200 mg by mouth 2 Times a Day.        LevETIRAcetam (Tab) KEPPRA 1000 MG Take 2 Tabs by mouth 2 Times a Day.        Lisinopril (Tab) PRINIVIL 10 MG Take 1 Tab by mouth every day.        Multiple Vitamin (Tab) THERAGRAN  Take 1 Tab by mouth every day.        Warfarin Sodium (Tab) COUMADIN 1 MG Take three to four (3-4) tablets daily as directed by coumadin clinic        .                 Medicines prescribed today were sent to:     [id number] - [place], [place] - [address]    [address] [place] [address]    Phone: [phone] Fax: [phone]    Open 24 Hours?: No      Medication refill instructions:       If your prescription bottle indicates you have medication refills left, it is not necessary to call your provider’s office. Please contact your pharmacy and they will refill your medication.    If your prescription bottle indicates you do not have any refills left, you may request refills at any time through one of the following ways: The online [employer] system (except Urgent Care), by calling your provider’s office, or by asking your pharmacy to contact your provider’s office with a refill request. Medication refills are processed only during regular business hours and may not be available until the next business day. Your provider may request additional information or to have a follow-up visit with you prior to refilling your medication.   *Please Note: Medication refills are assigned a new Rx number when refilled electronically. Your pharmacy may indicate that no refills were authorized even though a new prescription for the same medication is available at the pharmacy. Please request the medicine by name with the pharmacy before contacting your provider for a refill.        Warfarin Dosing Calendar   February 2017 Details    Sun Mon Tue Wed Thu Fri Sat        1               2                3               4                 5               6               7               8               9               10               11                 12               13               14               15               16               17   2.1   3 mg   See details      18      3 mg           19      3 mg         20      3 mg         21      3 mg         22      2 mg         23      3 mg         24      3 mg         25      3 mg           26      3 mg         27      3 mg         28      3 mg              Date Details   02/17 This INR check   INR: 2.1   144 580-11 1/2018 ic valid               How to take your warfarin dose     To take:  2 mg Take 2 of the 1 mg tablets.    To take:  3 mg Take 3 of the 1 mg tablets.           Warfarin Dosing Calendar   March 2017 Details    Sun Mon Tue Wed Thu Fri Sat        1      2 mg         2      3 mg         3      3 mg         4      3 mg           5      3 mg         6      3 mg         7      3 mg         8      2 mg         9      3 mg         10      3 mg         11      3 mg           12      3 mg         13      3 mg         14      3 mg         15      2 mg         16      3 mg         17      3 mg         18      3 mg           19      3 mg         20      3 mg         21      3 mg         22      2 mg         23      3 mg         24      3 mg         25      3 mg           26      3 mg         27      3 mg         28      3 mg         29      2 mg         30      3 mg         31      3 mg           Date Details   No additional details    Date of next INR:  3/31/2017         How to take your warfarin dose     To take:  2 mg Take 2 of the 1 mg tablets.    To take:  3 mg Take 3 of the 1 mg tablets.              [employer] Access Code: [email or web address]  Expires: 2/19/2017  8:27 AM    [employer]  A secure, online tool to manage your health information     Medication Reviews [employer]® is a secure, online tool that connects you to your personalized health information from the  privacy of your home -- day or night - making it very easy for you to manage your healthcare. Once the activation process is completed, you can even access your medical information using the Portable Scores juan j, which is available for free in the Apple Juan J store or Google Play store.     Portable Scores provides the following levels of access (as shown below):   My Chart Features   [facility] Primary [facility] Doctor [facility]  Specialists [facility]  Urgent  Care Non-[facility]  Primary Care  Doctor   Email your healthcare team securely and privately 24/7 X X X    Manage appointments: schedule your next appointment; view details of past/upcoming appointments X      Request prescription refills. X      View recent personal medical records, including lab and immunizations X X X X   View health record, including health history, allergies, medications X X X X   Read reports about your outpatient visits, procedures, consult and ER notes X X X X   See your discharge summary, which is a recap of your hospital and/or ER visit that includes your diagnosis, lab results, and care plan. X X       How to register for Portable Scores:  1. Go to  [email or web address].  2. Click on the Sign Up Now box, which takes you to the New Member Sign Up page. You will need to provide the following information:  a. Enter your Portable Scores Access Code exactly as it appears at the top of this page. (You will not need to use this code after you’ve completed the sign-up process. If you do not sign up before the expiration date, you must request a new code.)   b. Enter your date of birth.   c. Enter your home email address.   d. Click Submit, and follow the next screen’s instructions.  3. Create a Portable Scores ID. This will be your Portable Scores login ID and cannot be changed, so think of one that is secure and easy to remember.  4. Create a Portable Scores password. You can change your password at any time.  5. Enter your Password Reset Question and Answer. This can be used at a later time if you forget  your password.   6. Enter your e-mail address. This allows you to receive e-mail notifications when new information is available in [employer].  7. Click Sign Up. You can now view your health information.    For assistance activating your [employer] account, call [phone]

## 2017-03-22 ENCOUNTER — OFFICE VISIT (OUTPATIENT)
Dept: NEUROLOGY | Facility: MEDICAL CENTER | Age: 62
End: 2017-03-22
Payer: COMMERCIAL

## 2017-03-22 VITALS
WEIGHT: 197.8 LBS | HEIGHT: 66 IN | OXYGEN SATURATION: 94 % | DIASTOLIC BLOOD PRESSURE: 72 MMHG | HEART RATE: 66 BPM | RESPIRATION RATE: 16 BRPM | SYSTOLIC BLOOD PRESSURE: 114 MMHG | TEMPERATURE: 97.7 F | BODY MASS INDEX: 31.79 KG/M2

## 2017-03-22 DIAGNOSIS — Z91.89 AT RISK FOR OSTEOPENIA: ICD-10-CM

## 2017-03-22 DIAGNOSIS — G45.0 VERTEBROBASILAR ARTERY SYNDROME: ICD-10-CM

## 2017-03-22 DIAGNOSIS — I63.9 CEREBROVASCULAR ACCIDENT (CVA), UNSPECIFIED MECHANISM (HCC): ICD-10-CM

## 2017-03-22 DIAGNOSIS — G40.009 PARTIAL IDIOPATHIC EPILEPSY WITH SEIZURES OF LOCALIZED ONSET, NOT INTRACTABLE, WITHOUT STATUS EPILEPTICUS (HCC): ICD-10-CM

## 2017-03-22 PROCEDURE — 99214 OFFICE O/P EST MOD 30 MIN: CPT | Performed by: NURSE PRACTITIONER

## 2017-03-22 ASSESSMENT — ENCOUNTER SYMPTOMS
MUSCULOSKELETAL NEGATIVE: 1
SORE THROAT: 0
SEIZURES: 0
COUGH: 0
NERVOUS/ANXIOUS: 0
DEPRESSION: 0
HEADACHES: 0
VOMITING: 0
DIARRHEA: 0
MEMORY LOSS: 1
DOUBLE VISION: 0
NAUSEA: 0
ABDOMINAL PAIN: 0
DIZZINESS: 0
CONSTITUTIONAL NEGATIVE: 1

## 2017-03-22 NOTE — PROGRESS NOTES
Subjective:      Roque Nguyen is a 61 y.o. male who presents with Follow-Up for Localization-related epilepsy.    Here by himself today.    HPI  No concern for seizures.  Last known seizure was March 2012.    He has had no concern for memory lapse, loss of awareness, or significant forgetfulness or expressive aphasia.    He has no real health concerns except he is trying to lose weight.  He has been busy at home, active.  He is trying to watch his diet.  Wife with significant health concerns.  Their oldest son has moved out and their younger is working in a kitchen.    Embolic stroke:  Stable with no concerns.  Doing well with coumadin and INR is routinely checked.  Denies an sensory changes, headaches, or weakness.    Current Outpatient Prescriptions   Medication Sig Dispense Refill   • lisinopril (PRINIVIL) 10 MG Tab Take 1 Tab by mouth every day. 90 Tab 3   • carvedilol (COREG) 12.5 MG Tab Take 1 Tab by mouth 2 times a day, with meals. 180 Tab 3   • warfarin (COUMADIN) 1 MG Tab Take three to four (3-4) tablets daily as directed by coumadin clinic 360 Tab 1   • levetiracetam (KEPPRA) 1000 MG tablet Take 2 Tabs by mouth 2 Times a Day. 120 Tab 11   • lacosamide (VIMPAT) 200 MG Tab tablet Take 200 mg by mouth 2 Times a Day. 60 Tab 11   • multivitamin (THERAGRAN) Tab Take 1 Tab by mouth every day.     • calcium citrate (CALCITRATE) 950 MG Tab Take 950 mg by mouth every day.       No current facility-administered medications for this visit.       Review of Systems   Constitutional: Negative.    HENT: Negative.  Negative for hearing loss, nosebleeds and sore throat.         No recent head injury.   Eyes: Negative for double vision.        No new loss of vision.   Respiratory: Negative for cough.         No recent lung infections.   Cardiovascular: Negative for chest pain.   Gastrointestinal: Negative for nausea, vomiting, abdominal pain and diarrhea.   Genitourinary: Negative.    Musculoskeletal: Negative.   "  Skin: Negative.    Neurological: Negative for dizziness, seizures and headaches.   Endo/Heme/Allergies:        No history of endocrine dysfunction.  No new problems.   Psychiatric/Behavioral: Positive for memory loss. Negative for depression. The patient is not nervous/anxious.         No recent mood changes.          Objective:     /72 mmHg  Pulse 66  Temp(Src) 36.5 °C (97.7 °F)  Resp 16  Ht 1.676 m (5' 6\")  Wt 89.721 kg (197 lb 12.8 oz)  BMI 31.94 kg/m2  SpO2 94%     Physical Exam   Constitutional: He is oriented to person, place, and time. He appears well-developed and well-nourished. No distress.   HENT:   Head: Normocephalic and atraumatic.   Nose: Nose normal.   Mouth/Throat: Oropharynx is clear and moist.   No new hearing loss.   Eyes: EOM are normal.   No double vision or loss of vision.   Neck: Normal range of motion.   Cardiovascular: Normal rate, regular rhythm and normal heart sounds.    No recent chest pain.   Pulmonary/Chest: Effort normal.   Abdominal: There is no tenderness.   Genitourinary:   No recent infections.   Musculoskeletal: Normal range of motion.   Lymphadenopathy:     He has no cervical adenopathy.   Neurological: He is alert and oriented to person, place, and time. He has normal strength and normal reflexes. He displays no tremor. No cranial nerve deficit. He displays no seizure activity. Gait normal.   No observable changes in neurologic status.  See initial new patient examination for details.     Skin: Skin is warm and dry.   Psychiatric: He has a normal mood and affect. His speech is normal. His mood appears not anxious. He does not exhibit a depressed mood.              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  Last EEG on record was in 2011.    Most recently the MOCA test scored 19/30.    He is a primary  for the family as his 2 sons " have autism and his wife is ill.  Lengthy conversation with Roque again about driving.  He is to drive on extremely limited basis, to limit highly/freeway driving.  He needs to be awake at least 1 hour prior to driving each time.    Continue Keppra 2000 mg twice a day and Vimpat 200 mg twice a day.  O    Return for follow-up care in 6 months.  I spent 35 minutes with this patient, over fifty percent was spent counseling patient on their condition, best management practices, reviewing test results and risks and benefits of treatment.    Vimpat is transmitted via [employer].

## 2017-03-22 NOTE — MR AVS SNAPSHOT
"        Roque Nguyen   3/22/2017 1:20 PM   Office Visit   MRN: [id number]    Department:  [facility]   Dept Phone:  [phone]    Description:  Male : 1955   Provider:  JUSTINA Corcoran           Reason for Visit     Follow-Up Partial idiopathic epilepsy with seizures of localized onset, not intractable, without status epilepticus      Allergies as of 3/22/2017     No Known Allergies      You were diagnosed with     Partial idiopathic epilepsy with seizures of localized onset, not intractable, without status epilepticus (CMS-HCC)   [7749226]         Vital Signs     Blood Pressure Pulse Temperature Respirations Height Weight    114/72 mmHg 66 36.5 °C (97.7 °F) 16 1.676 m (5' 6\") 89.721 kg (197 lb 12.8 oz)    Body Mass Index Oxygen Saturation Smoking Status             31.94 kg/m2 94% Former Smoker         Basic Information     Date Of Birth Sex Race Ethnicity Preferred Language    1955 Male White Non- English      Your appointments     Mar 31, 2017  8:45 AM   Anti-Coag Routine with [place] PHARMACIST   [facility]    [address] [place] [phone]   [phone]            Oct 04, 2017  1:00 PM   Follow Up Visit with JUSTINA Corcoran   [facility] (--)    [address] [place] [address], [address]  [facility] [phone]           You will be receiving a confirmation call a few days before your appointment from our automated call confirmation system.              Problem List              ICD-10-CM Priority Class Noted - Resolved    Antiphospholipid syndrome (CMS-[facility]) D68.61 Medium  3/15/2010 - Present    At risk for osteopenia Z91.89 Low  10/17/2013 - Present    Partial epilepsy with impairment of consciousness (CMS-[facility]) G40.209 Medium  10/17/2013 - Present    Essential and other specified forms of tremor G25.0, G25.2 Medium  10/17/2013 - Present    LBBB (left bundle branch block) I44.7 High  2015 - " Present    Elevated bilirubin  Medium  4/20/2015 - Present    LV dysfunction I51.9 High  4/22/2015 - Present    TIA (transient ischemic attack) G45.9 Medium  4/22/2015 - Present    Asthma J45.909 Medium  4/22/2015 - Present    CHF (congestive heart failure), NYHA class II (CMS-HCC) I50.9   5/15/2015 - Present    CVA (cerebral vascular accident) (CMS-HCC) I63.9   7/28/2015 - Present    Thrombocytopenia (CMS-HCC) D69.6   7/28/2015 - Present    S/P mitral valve repair (Chronic) Z98.890   Unknown - Present    Aphasia, late effect of 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   9/3/2015 - Present    Impaired cognition R41.89   9/14/2015 - Present    Chronic anticoagulation (Chronic) Z79.01   Unknown - Present    Obesity (BMI 30-39.9) E66.9   12/14/2016 - Present      Health Maintenance        Date Due Completion Dates    IMM DTaP/Tdap/Td Vaccine (1 - Tdap) 6/7/1973 ---    IMM ZOSTER VACCINE 6/7/2014 ---    COLONOSCOPY 4/1/2024 4/1/2014            Current Immunizations     Influenza TIV (IM) 3/11/2014    Influenza Vaccine Quad Inj (Pf) 12/14/2016    Influenza Vaccine Quad Inj (Preserved) 11/3/2014    Pneumococcal polysaccharide vaccine (PPSV-23) 1/1/2014      Below and/or attached are the medications your provider expects you to take. Review all of your home medications and newly ordered medications with your provider and/or pharmacist. Follow medication instructions as directed by your provider and/or pharmacist. Please keep your medication list with you and share with your provider. Update the information when medications are discontinued, doses are changed, or new medications (including over-the-counter products) are added; and carry medication information at all times in the event of emergency situations     Allergies:  No Known Allergies          Medications  Valid as of: March 22, 2017 -  1:41 PM    Generic Name Brand Name Tablet Size Instructions for use    Calcium Citrate (Tab) CALCITRATE 950 MG Take 950 mg by mouth every  day.        Carvedilol (Tab) COREG 12.5 MG Take 1 Tab by mouth 2 times a day, with meals.        Lacosamide (Tab) VIMPAT 200 MG Take 200 mg by mouth 2 Times a Day.        LevETIRAcetam (Tab) KEPPRA 1000 MG Take 2 Tabs by mouth 2 Times a Day.        Lisinopril (Tab) PRINIVIL 10 MG Take 1 Tab by mouth every day.        Multiple Vitamin (Tab) THERAGRAN  Take 1 Tab by mouth every day.        Warfarin Sodium (Tab) COUMADIN 1 MG Take three to four (3-4) tablets daily as directed by coumadin clinic        .                 Medicines prescribed today were sent to:     [facility] - [place], [place] - [address]    [address] [place] [address]    Phone: [phone] Fax: [phone]    Open 24 Hours?: No      Medication refill instructions:       If your prescription bottle indicates you have medication refills left, it is not necessary to call your provider’s office. Please contact your pharmacy and they will refill your medication.    If your prescription bottle indicates you do not have any refills left, you may request refills at any time through one of the following ways: The online [employer] system (except Urgent Care), by calling your provider’s office, or by asking your pharmacy to contact your provider’s office with a refill request. Medication refills are processed only during regular business hours and may not be available until the next business day. Your provider may request additional information or to have a follow-up visit with you prior to refilling your medication.   *Please Note: Medication refills are assigned a new Rx number when refilled electronically. Your pharmacy may indicate that no refills were authorized even though a new prescription for the same medication is available at the pharmacy. Please request the medicine by name with the pharmacy before contacting your provider for a refill.           [employer] Access Code: [email or web address]  Expires: 3/25/2017  1:12 PM    [employer]  secure, online tool to manage your health information     [employer] Trustifi® is a secure, online tool that connects you to your personalized health information from the privacy of your home -- day or night - making it very easy for you to manage your healthcare. Once the activation process is completed, you can even access your medical information using the Trustifi juan j, which is available for free in the Apple Juan J store or Google Play store.     Trustifi provides the following levels of access (as shown below):   My Chart Features   [facility] Primary [facility] Doctor [facility]  Specialists [facility]  Urgent  Care [facility]  Primary Care  Doctor   Email your healthcare team securely and privately 24/7 X X X    Manage appointments: schedule your next appointment; view details of past/upcoming appointments X      Request prescription refills. X      View recent personal medical records, including lab and immunizations X X X X   View health record, including health history, allergies, medications X X X X   Read reports about your outpatient visits, procedures, consult and ER notes X X X X   See your discharge summary, which is a recap of your hospital and/or ER visit that includes your diagnosis, lab results, and care plan. X X       How to register for Trustifi:  1. Go to  [email or web address].  2. Click on the Sign Up Now box, which takes you to the New Member Sign Up page. You will need to provide the following information:  a. Enter your Trustifi Access Code exactly as it appears at the top of this page. (You will not need to use this code after you’ve completed the sign-up process. If you do not sign up before the expiration date, you must request a new code.)   b. Enter your date of birth.   c. Enter your home email address.   d. Click Submit, and follow the next screen’s instructions.  3. Create a Trustifi ID. This will be your Trustifi login ID and cannot be changed, so think of one that is secure and easy to  remember.  4. Create a [employer] password. You can change your password at any time.  5. Enter your Password Reset Question and Answer. This can be used at a later time if you forget your password.   6. Enter your e-mail address. This allows you to receive e-mail notifications when new information is available in [employer].  7. Click Sign Up. You can now view your health information.    For assistance activating your [employer] account, call [phone]

## 2017-03-31 ENCOUNTER — ANTICOAGULATION VISIT (OUTPATIENT)
Dept: MEDICAL GROUP | Facility: PHYSICIAN GROUP | Age: 62
End: 2017-03-31
Payer: COMMERCIAL

## 2017-03-31 VITALS — DIASTOLIC BLOOD PRESSURE: 71 MMHG | SYSTOLIC BLOOD PRESSURE: 100 MMHG | HEART RATE: 106 BPM

## 2017-03-31 DIAGNOSIS — I63.9 CEREBROVASCULAR ACCIDENT (CVA), UNSPECIFIED MECHANISM (HCC): ICD-10-CM

## 2017-03-31 DIAGNOSIS — Z98.890 S/P MITRAL VALVE REPAIR: ICD-10-CM

## 2017-03-31 LAB — INR PPP: 2.2 (ref 2–3.5)

## 2017-03-31 PROCEDURE — 99999 PR NO CHARGE: CPT | Performed by: FAMILY MEDICINE

## 2017-03-31 PROCEDURE — 85610 PROTHROMBIN TIME: CPT | Performed by: FAMILY MEDICINE

## 2017-03-31 NOTE — MR AVS SNAPSHOT
Roque Nguyen   3/31/2017 8:45 AM   Anticoagulation Visit   MRN: [id number]    Department:  [facility]   Dept Phone:  [phone]    Description:  Male : 1955   Provider:  Markell Stanford, PHARMD           Allergies as of 3/31/2017     No Known Allergies      You were diagnosed with     S/P mitral valve repair   [[id number]]       Cerebrovascular accident (CVA), unspecified mechanism (CMS-[facility])   [[id number]]         Vital Signs     Blood Pressure Pulse Smoking Status             100/71 mmHg 106 Former Smoker         Basic Information     Date Of Birth Sex Race Ethnicity Preferred Language    1955 Male White Non- English      Your appointments     May 19, 2017  8:45 AM   Anti-Coag Routine with [place] PHARMACIST   [facility]    [address] [place] [id number]   [phone]            Oct 04, 2017  1:00 PM   Follow Up Visit with JUSTINA Corcoran   [facility] (--)    [address] [place] [address], [address]  [facility] [id number] [phone]           You will be receiving a confirmation call a few days before your appointment from our automated call confirmation system.              Problem List              ICD-10-CM Priority Class Noted - Resolved    Antiphospholipid syndrome (CMS-HCC) D68.61 Medium  3/15/2010 - Present    At risk for osteopenia Z91.89 Low  10/17/2013 - Present    Partial epilepsy with impairment of consciousness (CMS-[facility]) G40.209 Medium  10/17/2013 - Present    Essential and other specified forms of tremor G25.0, G25.2 Medium  10/17/2013 - Present    LBBB (left bundle branch block) I44.7 High  2015 - Present    Elevated bilirubin  Medium  2015 - Present    LV dysfunction I51.9 High  2015 - Present    TIA (transient ischemic attack) G45.9 Medium  2015 - Present    Asthma J45.909 Medium  2015 - Present    CHF (congestive heart failure), NYHA class II (CMS-[facility]) I50.9   5/15/2015 -  Present    CVA (cerebral vascular accident) (CMS-HCC) I63.9   7/28/2015 - Present    Thrombocytopenia (CMS-HCC) D69.6 High  7/28/2015 - Present    S/P mitral valve repair (Chronic) Z98.890   Unknown - Present    Aphasia, late effect of 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 Low  9/3/2015 - Present    Impaired cognition R41.89 Low  9/14/2015 - Present    Chronic anticoagulation (Chronic) Z79.01   Unknown - Present    Obesity (BMI 30-39.9) E66.9   12/14/2016 - Present      Health Maintenance        Date Due Completion Dates    IMM DTaP/Tdap/Td Vaccine (1 - Tdap) 6/7/1973 ---    IMM ZOSTER VACCINE 6/7/2014 ---    COLONOSCOPY 4/1/2024 4/1/2014            Results     POCT Protime      Component    INR    2.2    Comment:     [id number] 2/2018 ic valid                        Current Immunizations     Influenza TIV (IM) 3/11/2014    Influenza Vaccine Quad Inj (Pf) 12/14/2016    Influenza Vaccine Quad Inj (Preserved) 11/3/2014    Pneumococcal polysaccharide vaccine (PPSV-23) 1/1/2014      Below and/or attached are the medications your provider expects you to take. Review all of your home medications and newly ordered medications with your provider and/or pharmacist. Follow medication instructions as directed by your provider and/or pharmacist. Please keep your medication list with you and share with your provider. Update the information when medications are discontinued, doses are changed, or new medications (including over-the-counter products) are added; and carry medication information at all times in the event of emergency situations     Allergies:  No Known Allergies          Medications  Valid as of: March 31, 2017 -  8:50 AM    Generic Name Brand Name Tablet Size Instructions for use    Calcium Citrate (Tab) CALCITRATE 950 MG Take 950 mg by mouth every day.        Carvedilol (Tab) COREG 12.5 MG Take 1 Tab by mouth 2 times a day, with meals.        Lacosamide (Tab) VIMPAT 200 MG Take 200 mg by mouth 2 Times a Day.         LevETIRAcetam (Tab) KEPPRA 1000 MG Take 2 Tabs by mouth 2 Times a Day.        Lisinopril (Tab) PRINIVIL 10 MG Take 1 Tab by mouth every day.        Multiple Vitamin (Tab) THERAGRAN  Take 1 Tab by mouth every day.        Warfarin Sodium (Tab) COUMADIN 1 MG Take three to four (3-4) tablets daily as directed by coumadin clinic        .                 Medicines prescribed today were sent to:     [id number] - [place], [place] - [address]    [address] [facility] [place] [address]    Phone: [phone] Fax: [phone]    Open 24 Hours?: No      Medication refill instructions:       If your prescription bottle indicates you have medication refills left, it is not necessary to call your provider’s office. Please contact your pharmacy and they will refill your medication.    If your prescription bottle indicates you do not have any refills left, you may request refills at any time through one of the following ways: The online [employer] system (except Urgent Care), by calling your provider’s office, or by asking your pharmacy to contact your provider’s office with a refill request. Medication refills are processed only during regular business hours and may not be available until the next business day. Your provider may request additional information or to have a follow-up visit with you prior to refilling your medication.   *Please Note: Medication refills are assigned a new Rx number when refilled electronically. Your pharmacy may indicate that no refills were authorized even though a new prescription for the same medication is available at the pharmacy. Please request the medicine by name with the pharmacy before contacting your provider for a refill.        Warfarin Dosing Calendar   March 2017 Details    Sun Mon Tue Wed Thu Fri Sat        1               2               3               4                 5               6               7               8               9               10               11                  12               13               14               15               16               17               18                 19               20               21               22               23               24               25                 26               27               28               29               30               31   2.2   3 mg   See details        Date Details   03/31 This INR check   INR: 2.2   [id number] 2/2018 ic valid               How to take your warfarin dose     To take:  3 mg Take 3 of the 1 mg tablets.           Warfarin Dosing Calendar   April 2017 Details    Sun Mon Tue Wed Thu Fri Sat           1      3 mg           2      3 mg         3      3 mg         4      3 mg         5      2 mg         6      3 mg         7      3 mg         8      3 mg           9      3 mg         10      3 mg         11      3 mg         12      2 mg         13      3 mg         14      3 mg         15      3 mg           16      3 mg         17      3 mg         18      3 mg         19      2 mg         20      3 mg         21      3 mg         22      3 mg           23      3 mg         24      3 mg         25      3 mg         26      2 mg         27      3 mg         28      3 mg         29      3 mg           30      3 mg                Date Details   No additional details            How to take your warfarin dose     To take:  2 mg Take 2 of the 1 mg tablets.    To take:  3 mg Take 3 of the 1 mg tablets.           Warfarin Dosing Calendar   May 2017 Details    Sun Mon Tue Wed Thu Fri Sat      1      3 mg         2      3 mg         3      2 mg         4      3 mg         5      3 mg         6      3 mg           7      3 mg         8      3 mg         9      3 mg         10      2 mg         11      3 mg         12      3 mg         13      3 mg           14      3 mg         15      3 mg         16      3 mg         17      2 mg         18      3 mg         19      3 mg         20                 21                22               23               24               25               26               27                 28               29               30               31                   Date Details   No additional details    Date of next INR:  5/19/2017         How to take your warfarin dose     To take:  2 mg Take 2 of the 1 mg tablets.    To take:  3 mg Take 3 of the 1 mg tablets.              MobileVeda Access Code: [email or web address]  Expires: 4/23/2017 10:39 AM    MobileVeda  A secure, online tool to manage your health information     [employer]’s MobileVeda® is a secure, online tool that connects you to your personalized health information from the privacy of your home -- day or night - making it very easy for you to manage your healthcare. Once the activation process is completed, you can even access your medical information using the MobileVeda juan j, which is available for free in the Apple Juan J store or Google Play store.     MobileVeda provides the following levels of access (as shown below):   My Chart Features   [facility] Primary Care Doctor [facility]  Specialists [facility]  Urgent  Care Non-[facility]  Primary Care  Doctor   Email your healthcare team securely and privately 24/7 X X X    Manage appointments: schedule your next appointment; view details of past/upcoming appointments X      Request prescription refills. X      View recent personal medical records, including lab and immunizations X X X X   View health record, including health history, allergies, medications X X X X   Read reports about your outpatient visits, procedures, consult and ER notes X X X X   See your discharge summary, which is a recap of your hospital and/or ER visit that includes your diagnosis, lab results, and care plan. X X       How to register for MobileVeda:  1. Go to  [email or web address].  2. Click on the Sign Up Now box, which takes you to the New Member Sign Up page. You will need to provide the following information:  a. Enter your [employer]  Access Code exactly as it appears at the top of this page. (You will not need to use this code after you’ve completed the sign-up process. If you do not sign up before the expiration date, you must request a new code.)   b. Enter your date of birth.   c. Enter your home email address.   d. Click Submit, and follow the next screen’s instructions.  3. Create a Referral.IM ID. This will be your Referral.IM login ID and cannot be changed, so think of one that is secure and easy to remember.  4. Create a [employer] password. You can change your password at any time.  5. Enter your Password Reset Question and Answer. This can be used at a later time if you forget your password.   6. Enter your e-mail address. This allows you to receive e-mail notifications when new information is available in Referral.IM.  7. Click Sign Up. You can now view your health information.    For assistance activating your Referral.IM account, call [phone]

## 2017-03-31 NOTE — PROGRESS NOTES
Anticoagulation Summary as of 3/31/2017     INR goal 2.0-3.0   Selected INR 2.2 (3/31/2017)   Maintenance plan 2 mg (1 mg x 2) on Wed; 3 mg (1 mg x 3) all other days   Weekly total 20 mg   Plan last modified Markell Stanford PHARMD (1/9/2017)   Next INR check 5/19/2017   Target end date Indefinite    Indications   CVA (cerebral vascular accident) (CMS-HCC) [I63.9]  S/P mitral valve repair [Z98.890]  Transient cerebral ischemia (Resolved) [G45.9]         Anticoagulation Episode Summary     INR check location Home Draw    Preferred lab     Send INR reminders to     Comments       Anticoagulation Care Providers     Provider Role Specialty Phone number    Dudley Warner M.D. Referring Cardiology [phone]    Clau Carrera PHARMD Responsible          Anticoagulation Patient Findings   Negatives Missed Doses, Extra Doses, Medication Changes, Antibiotic Use, Diet Changes, Dental/Other Procedures, Hospitalization, Bleeding Gums, Nose Bleeds, Blood in Urine, Blood in Stool, Any Bruising, Other Complaints        Patient is therapeutic today with INR of 2.2.  Pt denies any unusual s/s of bleeding, bruising, clotting or any changes to diet or medications.  Pt is to continue with current warfarin dosing regimen.  Follow up in 7 weeks.    Markell Stanford, LEONA

## 2017-04-14 ENCOUNTER — TELEPHONE (OUTPATIENT)
Dept: NEUROLOGY | Facility: MEDICAL CENTER | Age: 62
End: 2017-04-14

## 2017-04-14 NOTE — TELEPHONE ENCOUNTER
Luis Antonio Quintero M.D.  Mary Sebastian, Med Ass't       Caller: Unspecified (Today, 10:52 AM)                     Please prepare such letter and I will be happy to sign it.     Thank you

## 2017-04-14 NOTE — TELEPHONE ENCOUNTER
Message: patient's wife called stating that [employer] will not accept Margie Long's office notes because she's an APN. She needs a letter from the director of Neurology (Dr. Quintero) stating that Margie Long's records at competent and viable.     Caller: wade  Call Back #: [phone]  OK to leave detailed message: n

## 2017-04-14 NOTE — Clinical Note
April 17, 2017        Roque Jarred Nguyen  [address] [facility] [place] [address]        To Whom This May Concern:    Ms Margie Long, APRN-BC, has been working under my direction for nearly 4 years.  She provides complex care for our refractory seizure patients across the age range.  She is in constant contact with our two epileptologists as well.  Her continuous education is extensive and most recently includes attending a week long world-renowned conference hosted by the [employer].      She is highly competent with her data collection, diagnosis, and treatment plan development.  Furthermore, she is a trust-worthy clinician and recorder.          Sincerely,        Luis Antonio Quintero M.D.

## 2017-05-19 ENCOUNTER — ANTICOAGULATION VISIT (OUTPATIENT)
Dept: MEDICAL GROUP | Facility: PHYSICIAN GROUP | Age: 62
End: 2017-05-19
Payer: COMMERCIAL

## 2017-05-19 DIAGNOSIS — I63.9 CEREBROVASCULAR ACCIDENT (CVA), UNSPECIFIED MECHANISM (HCC): ICD-10-CM

## 2017-05-19 DIAGNOSIS — Z98.890 S/P MITRAL VALVE REPAIR: ICD-10-CM

## 2017-05-19 LAB — INR PPP: 2.6 (ref 2–3.5)

## 2017-05-19 PROCEDURE — 85610 PROTHROMBIN TIME: CPT | Performed by: FAMILY MEDICINE

## 2017-05-19 PROCEDURE — 99999 PR NO CHARGE: CPT | Performed by: FAMILY MEDICINE

## 2017-05-19 NOTE — PROGRESS NOTES
Anticoagulation Summary as of 5/19/2017     INR goal 2.0-3.0   Selected INR 2.6 (5/19/2017)   Maintenance plan 2 mg (1 mg x 2) on Wed; 3 mg (1 mg x 3) all other days   Weekly total 20 mg   Plan last modified Markell Stanford PHARMD (1/9/2017)   Next INR check 7/14/2017   Target end date Indefinite    Indications   CVA (cerebral vascular accident) (CMS-HCC) [I63.9]  S/P mitral valve repair [Z98.890]  Transient cerebral ischemia (Resolved) [G45.9]         Anticoagulation Episode Summary     INR check location Home Draw    Preferred lab     Send INR reminders to     Comments       Anticoagulation Care Providers     Provider Role Specialty Phone number    Dudley Warner M.D. Referring Cardiology [phone]    Clau Carrera PHARMD Responsible          Anticoagulation Patient Findings   Negatives Missed Doses, Extra Doses, Medication Changes, Antibiotic Use, Diet Changes, Dental/Other Procedures, Hospitalization, Bleeding Gums, Nose Bleeds, Blood in Urine, Blood in Stool, Any Bruising, Other Complaints        Patient is therapeutic today with INR of 2.6.  Pt denies any unusual s/s of bleeding, bruising, clotting or any changes to diet or medications.  Pt is to continue with current warfarin dosing regimen.  Patient declines BP/vitals check.  Follow up in 8 weeks.    Markell Stanford, LEONA

## 2017-05-19 NOTE — MR AVS SNAPSHOT
Roque Nguyen   2017 8:45 AM   Anticoagulation Visit   MRN: [id number]    Department:  [facility]   Dept Phone:  [phone]    Description:  Male : 1955   Provider:  Markell Stanford, PHARMD           Allergies as of 2017     No Known Allergies      You were diagnosed with     S/P mitral valve repair   [[id number]]       Cerebrovascular accident (CVA), unspecified mechanism (CMS-[facility])   [[id number]]         Vital Signs     Smoking Status                   Former Smoker           Basic Information     Date Of Birth Sex Race Ethnicity Preferred Language    1955 Male White Non- English      Your appointments     2017  8:45 AM   Anti-Coag Routine with [place] PHARMACIST   [facility]    [address] [place] [id number] [phone]            Oct 04, 2017  1:00 PM   Follow Up Visit with JUSTINA Corcoran   [facility] Neurology (--)    [address] [place] [address], [address]  [facility] [address] [phone]           You will be receiving a confirmation call a few days before your appointment from our automated call confirmation system.              Problem List              ICD-10-CM Priority Class Noted - Resolved    Antiphospholipid syndrome (CMS-HCC) D68.61 Medium  3/15/2010 - Present    At risk for osteopenia Z91.89 Low  10/17/2013 - Present    Partial epilepsy with impairment of consciousness (CMS-[facility]) G40.209 Medium  10/17/2013 - Present    Essential and other specified forms of tremor G25.0, G25.2 Medium  10/17/2013 - Present    LBBB (left bundle branch block) I44.7 High  2015 - Present    Elevated bilirubin  Medium  2015 - Present    LV dysfunction I51.9 High  2015 - Present    TIA (transient ischemic attack) G45.9 Medium  2015 - Present    Asthma J45.909 Medium  2015 - Present    CHF (congestive heart failure), NYHA class II (CMS-[facility]) I50.9   5/15/2015 - Present    CVA (cerebral  vascular accident) (CMS-HCC) I63.9   7/28/2015 - Present    Thrombocytopenia (CMS-HCC) D69.6 High  7/28/2015 - Present    S/P mitral valve repair (Chronic) Z98.890   Unknown - Present    Aphasia, late effect of 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 Low  9/3/2015 - Present    Impaired cognition R41.89 Low  9/14/2015 - Present    Chronic anticoagulation (Chronic) Z79.01   Unknown - Present    Obesity (BMI 30-39.9) E66.9   12/14/2016 - Present      Health Maintenance        Date Due Completion Dates    IMM DTaP/Tdap/Td Vaccine (1 - Tdap) 6/7/1973 ---    IMM ZOSTER VACCINE 6/7/2014 ---    COLONOSCOPY 4/1/2024 4/1/2014            Results     POCT Protime      Component    INR    2.6    Comment:     [id number] 3/2018 ic valid                        Current Immunizations     Influenza TIV (IM) 3/11/2014    Influenza Vaccine Quad Inj (Pf) 12/14/2016    Influenza Vaccine Quad Inj (Preserved) 11/3/2014    Pneumococcal polysaccharide vaccine (PPSV-23) 1/1/2014      Below and/or attached are the medications your provider expects you to take. Review all of your home medications and newly ordered medications with your provider and/or pharmacist. Follow medication instructions as directed by your provider and/or pharmacist. Please keep your medication list with you and share with your provider. Update the information when medications are discontinued, doses are changed, or new medications (including over-the-counter products) are added; and carry medication information at all times in the event of emergency situations     Allergies:  No Known Allergies          Medications  Valid as of: May 19, 2017 -  8:52 AM    Generic Name Brand Name Tablet Size Instructions for use    Calcium Citrate (Tab) CALCITRATE 950 MG Take 950 mg by mouth every day.        Carvedilol (Tab) COREG 12.5 MG Take 1 Tab by mouth 2 times a day, with meals.        Lacosamide (Tab) VIMPAT 200 MG Take 200 mg by mouth 2 Times a Day.        LevETIRAcetam (Tab) KEPPRA 1000  MG Take 2 Tabs by mouth 2 Times a Day.        Lisinopril (Tab) PRINIVIL 10 MG Take 1 Tab by mouth every day.        Multiple Vitamin (Tab) THERAGRAN  Take 1 Tab by mouth every day.        Warfarin Sodium (Tab) COUMADIN 1 MG Take three to four (3-4) tablets daily as directed by coumadin clinic        .                 Medicines prescribed today were sent to:     [id number] - [place], [place] - [address]    [address] [place] [address]    Phone: [phone] Fax: [phone]    Open 24 Hours?: No      Medication refill instructions:       If your prescription bottle indicates you have medication refills left, it is not necessary to call your provider’s office. Please contact your pharmacy and they will refill your medication.    If your prescription bottle indicates you do not have any refills left, you may request refills at any time through one of the following ways: The online [employer] Mail [employer] system (except Urgent Care), by calling your provider’s office, or by asking your pharmacy to contact your provider’s office with a refill request. Medication refills are processed only during regular business hours and may not be available until the next business day. Your provider may request additional information or to have a follow-up visit with you prior to refilling your medication.   *Please Note: Medication refills are assigned a new Rx number when refilled electronically. Your pharmacy may indicate that no refills were authorized even though a new prescription for the same medication is available at the pharmacy. Please request the medicine by name with the pharmacy before contacting your provider for a refill.        Warfarin Dosing Calendar   May 2017 Details    Sun Mon Tue Wed Thu Fri Sat      1               2               3               4               5               6                 7               8               9               10               11               12               13                    14               15               16               17               18               19   2.6   3 mg   See details      20      3 mg           21      3 mg         22      3 mg         23      3 mg         24      2 mg         25      3 mg         26      3 mg         27      3 mg           28      3 mg         29      3 mg         30      3 mg         31      2 mg             Date Details   05/19 This INR check   INR: 2.6   [id number] 3/2018 ic valid               How to take your warfarin dose     To take:  2 mg Take 2 of the 1 mg tablets.    To take:  3 mg Take 3 of the 1 mg tablets.           Warfarin Dosing Calendar   June 2017 Details    Sun Mon Tue Wed Thu Fri Sat         1      3 mg         2      3 mg         3      3 mg           4      3 mg         5      3 mg         6      3 mg         7      2 mg         8      3 mg         9      3 mg         10      3 mg           11      3 mg         12      3 mg         13      3 mg         14      2 mg         15      3 mg         16      3 mg         17      3 mg           18      3 mg         19      3 mg         20      3 mg         21      2 mg         22      3 mg         23      3 mg         24      3 mg           25      3 mg         26      3 mg         27      3 mg         28      2 mg         29      3 mg         30      3 mg           Date Details   No additional details            How to take your warfarin dose     To take:  2 mg Take 2 of the 1 mg tablets.    To take:  3 mg Take 3 of the 1 mg tablets.           Warfarin Dosing Calendar   July 2017 Details    Sun Mon Tue Wed Thu Fri Sat           1      3 mg           2      3 mg         3      3 mg         4      3 mg         5      2 mg         6      3 mg         7      3 mg         8      3 mg           9      3 mg         10      3 mg         11      3 mg         12      2 mg         13      3 mg         14      3 mg         15                 16               17               18                19               20               21               22                 23               24               25               26               27               28               29                 30               31                     Date Details   No additional details    Date of next INR:  7/14/2017         How to take your warfarin dose     To take:  2 mg Take 2 of the 1 mg tablets.    To take:  3 mg Take 3 of the 1 mg tablets.              Lumus Access Code: [email or web address]  Expires: 6/17/2017 11:20 AM    Lumus  A secure, online tool to manage your health information     [employer]’s Lumus® is a secure, online tool that connects you to your personalized health information from the privacy of your home -- day or night - making it very easy for you to manage your healthcare. Once the activation process is completed, you can even access your medical information using the Lumus juan j, which is available for free in the Apple Juan J store or Google Play store.     Lumus provides the following levels of access (as shown below):   My Chart Features   [facility] Primary Care Doctor [facility]  Specialists [facility]  Urgent  Care Non-[facility]  Primary Care  Doctor   Email your healthcare team securely and privately 24/7 X X X    Manage appointments: schedule your next appointment; view details of past/upcoming appointments X      Request prescription refills. X      View recent personal medical records, including lab and immunizations X X X X   View health record, including health history, allergies, medications X X X X   Read reports about your outpatient visits, procedures, consult and ER notes X X X X   See your discharge summary, which is a recap of your hospital and/or ER visit that includes your diagnosis, lab results, and care plan. X X       How to register for Lumus:  1. Go to  [email or web address].  2. Click on the Sign Up Now box, which takes you to the New Member Sign Up page. You will need to provide the  following information:  a. Enter your Huodongxing Access Code exactly as it appears at the top of this page. (You will not need to use this code after you’ve completed the sign-up process. If you do not sign up before the expiration date, you must request a new code.)   b. Enter your date of birth.   c. Enter your home email address.   d. Click Submit, and follow the next screen’s instructions.  3. Create a Huodongxing ID. This will be your Huodongxing login ID and cannot be changed, so think of one that is secure and easy to remember.  4. Create a Huodongxing password. You can change your password at any time.  5. Enter your Password Reset Question and Answer. This can be used at a later time if you forget your password.   6. Enter your e-mail address. This allows you to receive e-mail notifications when new information is available in Huodongxing.  7. Click Sign Up. You can now view your health information.    For assistance activating your Huodongxing account, call [phone]

## 2017-06-07 DIAGNOSIS — D68.61 ANTIPHOSPHOLIPID SYNDROME (HCC): ICD-10-CM

## 2017-06-07 RX ORDER — WARFARIN SODIUM 1 MG/1
TABLET ORAL
Qty: 270 TAB | Refills: 1 | Status: SHIPPED | OUTPATIENT
Start: 2017-06-07 | End: 2017-12-11 | Stop reason: SDUPTHER

## 2017-06-07 RX ORDER — WARFARIN SODIUM 1 MG/1
TABLET ORAL
Refills: 0 | OUTPATIENT
Start: 2017-06-07

## 2017-07-14 ENCOUNTER — ANTICOAGULATION VISIT (OUTPATIENT)
Dept: MEDICAL GROUP | Facility: PHYSICIAN GROUP | Age: 62
End: 2017-07-14
Payer: COMMERCIAL

## 2017-07-14 DIAGNOSIS — I63.9 CEREBROVASCULAR ACCIDENT (CVA), UNSPECIFIED MECHANISM (HCC): ICD-10-CM

## 2017-07-14 DIAGNOSIS — Z98.890 S/P MITRAL VALVE REPAIR: ICD-10-CM

## 2017-07-14 LAB — INR PPP: 3 (ref 2–3.5)

## 2017-07-14 PROCEDURE — 85610 PROTHROMBIN TIME: CPT | Performed by: FAMILY MEDICINE

## 2017-07-14 NOTE — PROGRESS NOTES
Anticoagulation Summary as of 7/14/2017     INR goal 2.0-3.0   Selected INR 3.0 (7/14/2017)   Maintenance plan 2 mg (1 mg x 2) on Wed; 3 mg (1 mg x 3) all other days   Weekly total 20 mg   Plan last modified Markell Stanford PHARMD (1/9/2017)   Next INR check 9/8/2017   Target end date Indefinite    Indications   CVA (cerebral vascular accident) (CMS-HCC) [I63.9]  S/P mitral valve repair [Z98.890]  Transient cerebral ischemia (Resolved) [G45.9]         Anticoagulation Episode Summary     INR check location Home Draw    Preferred lab     Send INR reminders to     Comments       Anticoagulation Care Providers     Provider Role Specialty Phone number    Dudley Warner M.D. Referring Cardiology [phone]    Clau Carrera PHARMD Responsible          Anticoagulation Patient Findings   Negatives Missed Doses, Extra Doses, Medication Changes, Antibiotic Use, Diet Changes, Dental/Other Procedures, Hospitalization, Bleeding Gums, Nose Bleeds, Blood in Urine, Blood in Stool, Any Bruising, Other Complaints        Patient is therapeutic today with INR of 3.0.  Pt denies any unusual s/s of bleeding, bruising, clotting or any changes to diet or medications.  Pt is to continue with current warfarin dosing regimen.  Patient declines BP/vitals check today.  Follow up in 8 weeks.    Markell Stanford PHARMD

## 2017-07-14 NOTE — MR AVS SNAPSHOT
Roque Nguyen   2017 8:45 AM   Anticoagulation Visit   MRN: [id number]    Department:  [facility]   Dept Phone:  [phone]    Description:  Male : 1955   Provider:  Markell Stanford PHARMD           Allergies as of 2017     No Known Allergies      You were diagnosed with     S/P mitral valve repair   [[id number]]       Cerebrovascular accident (CVA), unspecified mechanism (CMS-[facility])   [[id number]]         Vital Signs     Smoking Status                   Former Smoker           Basic Information     Date Of Birth Sex Race Ethnicity Preferred Language    1955 Male White Non- English      Your appointments     2017  8:45 AM   Anti-Coag Routine with Markell Stanford PHARMD   [facility]    [address] [facility] [id number] [phone]            Sep 08, 2017  8:45 AM   Anti-Coag Routine with [place] PHARMACIST   [facility]    [address] [facility] [id number] [phone]            Oct 13, 2017 11:00 AM   Follow Up Visit with JUSTINA Corcoran   [facility] Neurology (--)    [address], [address]  [facility] [address] [phone]           You will be receiving a confirmation call a few days before your appointment from our automated call confirmation system.              Problem List              ICD-10-CM Priority Class Noted - Resolved    Antiphospholipid syndrome (CMS-HCC) D68.61 Medium  3/15/2010 - Present    At risk for osteopenia Z91.89 Low  10/17/2013 - Present    Partial epilepsy with impairment of consciousness (CMS-HCC) G40.209 Medium  10/17/2013 - Present    Essential and other specified forms of tremor G25.0, G25.2 Medium  10/17/2013 - Present    LBBB (left bundle branch block) I44.7 High  2015 - Present    Elevated bilirubin  Medium  2015 - Present    LV dysfunction I51.9 High  2015 - Present    TIA (transient ischemic attack)  G45.9 Medium  4/22/2015 - Present    Asthma J45.909 Medium  4/22/2015 - Present    CHF (congestive heart failure), NYHA class II (CMS-HCC) I50.9   5/15/2015 - Present    CVA (cerebral vascular accident) (CMS-HCC) I63.9   7/28/2015 - Present    Thrombocytopenia (CMS-HCC) D69.6 High  7/28/2015 - Present    S/P mitral valve repair (Chronic) Z98.890   Unknown - Present    Aphasia, late effect of 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 Low  9/3/2015 - Present    Impaired cognition R41.89 Low  9/14/2015 - Present    Chronic anticoagulation (Chronic) Z79.01   Unknown - Present    Obesity (BMI 30-39.9) E66.9   12/14/2016 - Present      Health Maintenance        Date Due Completion Dates    IMM DTaP/Tdap/Td Vaccine (1 - Tdap) 6/7/1973 ---    IMM ZOSTER VACCINE 6/7/2014 ---    IMM INFLUENZA (1) 9/1/2017 12/14/2016, 11/3/2014, 3/11/2014    COLONOSCOPY 4/1/2024 4/1/2014            Results     POCT Protime      Component    INR    3.0    Comment:     [id number] 5/2018 ic valid                        Current Immunizations     Influenza TIV (IM) 3/11/2014    Influenza Vaccine Quad Inj (Pf) 12/14/2016    Influenza Vaccine Quad Inj (Preserved) 11/3/2014    Pneumococcal polysaccharide vaccine (PPSV-23) 1/1/2014      Below and/or attached are the medications your provider expects you to take. Review all of your home medications and newly ordered medications with your provider and/or pharmacist. Follow medication instructions as directed by your provider and/or pharmacist. Please keep your medication list with you and share with your provider. Update the information when medications are discontinued, doses are changed, or new medications (including over-the-counter products) are added; and carry medication information at all times in the event of emergency situations     Allergies:  No Known Allergies          Medications  Valid as of: July 14, 2017 -  8:37 AM    Generic Name Brand Name Tablet Size Instructions for use    Calcium Citrate (Tab)  CALCITRATE 950 MG Take 950 mg by mouth every day.        Carvedilol (Tab) COREG 12.5 MG Take 1 Tab by mouth 2 times a day, with meals.        Lacosamide (Tab) VIMPAT 200 MG Take 200 mg by mouth 2 Times a Day.        LevETIRAcetam (Tab) KEPPRA 1000 MG Take 2 Tabs by mouth 2 Times a Day.        Lisinopril (Tab) PRINIVIL 10 MG Take 1 Tab by mouth every day.        Multiple Vitamin (Tab) THERAGRAN  Take 1 Tab by mouth every day.        Warfarin Sodium (Tab) COUMADIN 1 MG Take two to three (2-3) tablets daily as directed by [facility]        .                 Medicines prescribed today were sent to:     [id number] - [place], [place] - [address]    [address] [facility] [place] [address]    Phone: [phone] Fax: [phone]    Open 24 Hours?: No      Medication refill instructions:       If your prescription bottle indicates you have medication refills left, it is not necessary to call your provider’s office. Please contact your pharmacy and they will refill your medication.    If your prescription bottle indicates you do not have any refills left, you may request refills at any time through one of the following ways: The online lensgen system (except Urgent Care), by calling your provider’s office, or by asking your pharmacy to contact your provider’s office with a refill request. Medication refills are processed only during regular business hours and may not be available until the next business day. Your provider may request additional information or to have a follow-up visit with you prior to refilling your medication.   *Please Note: Medication refills are assigned a new Rx number when refilled electronically. Your pharmacy may indicate that no refills were authorized even though a new prescription for the same medication is available at the pharmacy. Please request the medicine by name with the pharmacy before contacting your provider for a refill.        Warfarin Dosing Calendar      July 2017 Details    Sun Mon Tue Wed Thu Fri Sat           1                 2               3               4               5               6               7               8                 9               10               11               12               13               14   3.0   3 mg   See details      15      3 mg           16      3 mg         17      3 mg         18      3 mg         19      2 mg         20      3 mg         21      3 mg         22      3 mg           23      3 mg         24      3 mg         25      3 mg         26      2 mg         27      3 mg         28      3 mg         29      3 mg           30      3 mg         31      3 mg               Date Details   07/14 This INR check   INR: 3.0   [id number] 5/2018 ic valid               How to take your warfarin dose     To take:  2 mg Take 2 of the 1 mg tablets.    To take:  3 mg Take 3 of the 1 mg tablets.           Warfarin Dosing Calendar   August 2017 Details    Sun Mon Tue Wed Thu Fri Sat       1      3 mg         2      2 mg         3      3 mg         4      3 mg         5      3 mg           6      3 mg         7      3 mg         8      3 mg         9      2 mg         10      3 mg         11      3 mg         12      3 mg           13      3 mg         14      3 mg         15      3 mg         16      2 mg         17      3 mg         18      3 mg         19      3 mg           20      3 mg         21      3 mg         22      3 mg         23      2 mg         24      3 mg         25      3 mg         26      3 mg           27      3 mg         28      3 mg         29      3 mg         30      2 mg         31      3 mg            Date Details   No additional details            How to take your warfarin dose     To take:  2 mg Take 2 of the 1 mg tablets.    To take:  3 mg Take 3 of the 1 mg tablets.           Warfarin Dosing Calendar   September 2017 Details    Sun Mon Tue Wed Thu Fri Sat          1      3 mg         2      3 mg            3      3 mg         4      3 mg         5      3 mg         6      2 mg         7      3 mg         8      3 mg         9                 10               11               12               13               14               15               16                 17               18               19               20               21               22               23                 24               25               26               27               28               29               30                Date Details   No additional details    Date of next INR:  9/8/2017         How to take your warfarin dose     To take:  2 mg Take 2 of the 1 mg tablets.    To take:  3 mg Take 3 of the 1 mg tablets.              Info Assembly Access Code: [email or web address]  Expires: 8/13/2017  8:24 AM    Info Assembly  A secure, online tool to manage your health information     [employer]® is a secure, online tool that connects you to your personalized health information from the privacy of your home -- day or night - making it very easy for you to manage your healthcare. Once the activation process is completed, you can even access your medical information using the [employer] juan j, which is available for free in the Apple Juan J store or Google Play store.     Info Assembly provides the following levels of access (as shown below):   My Chart Features   [facility] Primary Care Doctor [facility]  Specialists [facility]  Urgent  Care Non-[facility]  Primary Care  Doctor   Email your healthcare team securely and privately 24/7 X X X    Manage appointments: schedule your next appointment; view details of past/upcoming appointments X      Request prescription refills. X      View recent personal medical records, including lab and immunizations X X X X   View health record, including health history, allergies, medications X X X X   Read reports about your outpatient visits, procedures, consult and ER notes X X X X   See your discharge summary, which is a recap of your  hospital and/or ER visit that includes your diagnosis, lab results, and care plan. X X       How to register for uberlife:  1. Go to  [email or web address].  2. Click on the Sign Up Now box, which takes you to the New Member Sign Up page. You will need to provide the following information:  a. Enter your uberlife Access Code exactly as it appears at the top of this page. (You will not need to use this code after you’ve completed the sign-up process. If you do not sign up before the expiration date, you must request a new code.)   b. Enter your date of birth.   c. Enter your home email address.   d. Click Submit, and follow the next screen’s instructions.  3. Create a [facility] ID. This will be your uberlife login ID and cannot be changed, so think of one that is secure and easy to remember.  4. Create a [facility] password. You can change your password at any time.  5. Enter your Password Reset Question and Answer. This can be used at a later time if you forget your password.   6. Enter your e-mail address. This allows you to receive e-mail notifications when new information is available in uberlife.  7. Click Sign Up. You can now view your health information.    For assistance activating your uberlife account, call [phone]

## 2017-08-29 DIAGNOSIS — G40.009 PARTIAL IDIOPATHIC EPILEPSY WITH SEIZURES OF LOCALIZED ONSET, NOT INTRACTABLE, WITHOUT STATUS EPILEPTICUS (HCC): ICD-10-CM

## 2017-08-29 NOTE — TELEPHONE ENCOUNTER
Was the patient seen in the last year in this department? Yes  3/22/17    Does patient have an active prescription for medications requested? Yes     Received Request Via: Patient     Next scheduled follow up appointment: 10/13/17      Need a copy of this mailed to patient's house. So they can send this to their new Mail order Pharmacy.

## 2017-08-30 RX ORDER — LACOSAMIDE 200 MG/1
200 TABLET ORAL 2 TIMES DAILY
Qty: 180 TAB | Refills: 1 | Status: SHIPPED
Start: 2017-08-30 | End: 2018-02-26 | Stop reason: SDUPTHER

## 2017-09-08 ENCOUNTER — ANTICOAGULATION VISIT (OUTPATIENT)
Dept: MEDICAL GROUP | Facility: PHYSICIAN GROUP | Age: 62
End: 2017-09-08
Payer: COMMERCIAL

## 2017-09-08 DIAGNOSIS — I63.9 CEREBROVASCULAR ACCIDENT (CVA), UNSPECIFIED MECHANISM (HCC): ICD-10-CM

## 2017-09-08 DIAGNOSIS — Z98.890 S/P MITRAL VALVE REPAIR: ICD-10-CM

## 2017-09-08 LAB — INR PPP: 2.8 (ref 2–3.5)

## 2017-09-08 PROCEDURE — 85610 PROTHROMBIN TIME: CPT | Performed by: FAMILY MEDICINE

## 2017-09-08 PROCEDURE — 99999 PR NO CHARGE: CPT | Performed by: FAMILY MEDICINE

## 2017-09-08 NOTE — PROGRESS NOTES
Anticoagulation Summary  As of 9/8/2017    INR goal:   2.0-3.0   TTR:   75.2 % (2.1 y)   Today's INR:   2.8   Maintenance plan:   2 mg (1 mg x 2) on Wed; 3 mg (1 mg x 3) all other days   Weekly total:   20 mg   Plan last modified:   Markell Stanford PharmD (1/9/2017)   Next INR check:   11/17/2017   Target end date:   Indefinite    Indications    CVA (cerebral vascular accident) (CMS-HCC) [I63.9]  S/P mitral valve repair [Z98.890]  Transient cerebral ischemia (Resolved) [G45.9]             Anticoagulation Episode Summary     INR check location:   Home Draw    Preferred lab:       Send INR reminders to:       Comments:         Anticoagulation Care Providers     Provider Role Specialty Phone number    Dudley Warner M.D. Referring Cardiology [phone]    Ramiro YepezD Responsible          Anticoagulation Patient Findings  Patient Findings     Negatives:   Signs/symptoms of thrombosis, Signs/symptoms of bleeding, Laboratory test error suspected, Change in health, Change in alcohol use, Change in activity, Upcoming invasive procedure, Emergency department visit, Upcoming dental procedure, Missed doses, Extra doses, Change in medications, Change in diet/appetite, Hospital admission, Bruising, Other complaints        HPI:   Interval history since last visit: Pt denies any unusual s/s of bleeding, bruising, clotting or any changes to diet or medications.  Compliant with current weekly warfarin regimen as detailed above.        Vitals:  Patient declines BP/vitals check at today's visit.     Asssessment:  INR therapeutic at 2.8.     Plan:  Pt is to continue with current warfarin dosing regimen.     Follow up in 10 weeks.    Markell Stanford PharmD

## 2017-10-13 ENCOUNTER — OFFICE VISIT (OUTPATIENT)
Dept: NEUROLOGY | Facility: MEDICAL CENTER | Age: 62
End: 2017-10-13
Payer: COMMERCIAL

## 2017-10-13 VITALS
WEIGHT: 201.1 LBS | SYSTOLIC BLOOD PRESSURE: 106 MMHG | HEIGHT: 66 IN | HEART RATE: 75 BPM | DIASTOLIC BLOOD PRESSURE: 64 MMHG | BODY MASS INDEX: 32.32 KG/M2 | OXYGEN SATURATION: 96 % | RESPIRATION RATE: 16 BRPM | TEMPERATURE: 96.8 F

## 2017-10-13 DIAGNOSIS — G40.009 PARTIAL IDIOPATHIC EPILEPSY WITH SEIZURES OF LOCALIZED ONSET, NOT INTRACTABLE, WITHOUT STATUS EPILEPTICUS (HCC): ICD-10-CM

## 2017-10-13 DIAGNOSIS — I63.9 CEREBROVASCULAR ACCIDENT (CVA), UNSPECIFIED MECHANISM (HCC): ICD-10-CM

## 2017-10-13 DIAGNOSIS — Z91.89 AT RISK FOR OSTEOPENIA: ICD-10-CM

## 2017-10-13 DIAGNOSIS — R41.3 MEMORY DISORDER: ICD-10-CM

## 2017-10-13 PROCEDURE — 99214 OFFICE O/P EST MOD 30 MIN: CPT | Performed by: NURSE PRACTITIONER

## 2017-10-13 RX ORDER — LEVETIRACETAM 1000 MG/1
2000 TABLET ORAL 2 TIMES DAILY
Qty: 120 TAB | Refills: 11 | Status: SHIPPED | OUTPATIENT
Start: 2017-10-13 | End: 2018-10-17 | Stop reason: SDUPTHER

## 2017-10-13 ASSESSMENT — ENCOUNTER SYMPTOMS
MUSCULOSKELETAL NEGATIVE: 1
DEPRESSION: 0
SORE THROAT: 0
DOUBLE VISION: 0
NAUSEA: 0
SEIZURES: 0
COUGH: 1
HEADACHES: 0
DIARRHEA: 0
WHEEZING: 1
NERVOUS/ANXIOUS: 0
VOMITING: 0
ABDOMINAL PAIN: 0

## 2017-10-13 NOTE — PROGRESS NOTES
"Subjective:      Roque Nguyen is a 62 y.o. male who presents with Follow-Up (Partial idiopathic epilepsy with seizures of localized onset, not intractable, without status epilepticus (CMS-HCC))        Here with his wife today.    HPI No concern for seizures.  Last known seizure was March 2012.     He has had a shift in his memory.  The wife seems more concerned about it than he.  Of concern, the family is experiencing significant stress with loss of his wife's mother and the wife just completing 25 days of radiation.    No events of loss of awareness, or significant forgetfulness or expressive aphasia.     He has no real health concerns except he is trying to lose weight.  He has been busy at home, active.  He is trying to watch his diet.    Wife with significant health concerns.  Their oldest son has moved out and their younger is working in a kitchen.     Embolic stroke:  Stable with no concerns.  Doing well with coumadin and INR is routinely checked.  Denies an sensory changes, headaches, or weakness.     Will sometimes take his medications with coffee and then will go to sleep for about 1 hour.  He seems to do better throughout the day with this little \"cat nap\" in the morning.    4/2016: INTERPRETATION:  This EEG denotes of focal cortical dysfunction over the left temporal region with  possible focal irritability over the left temporal region as well.      Current Outpatient Prescriptions   Medication Sig Dispense Refill   • lacosamide (VIMPAT) 200 MG Tab tablet Take 200 mg by mouth 2 Times a Day. 180 Tab 1   • warfarin (COUMADIN) 1 MG Tab Take two to three (2-3) tablets daily as directed by [facility] 270 Tab 1   • lisinopril (PRINIVIL) 10 MG Tab Take 1 Tab by mouth every day. 90 Tab 3   • carvedilol (COREG) 12.5 MG Tab Take 1 Tab by mouth 2 times a day, with meals. 180 Tab 3   • levetiracetam (KEPPRA) 1000 MG tablet Take 2 Tabs by mouth 2 Times a Day. 120 Tab 11   • multivitamin " (THERAGRAN) Tab Take 1 Tab by mouth every day.     • calcium citrate (CALCITRATE) 950 MG Tab Take 950 mg by mouth every day.       No current facility-administered medications for this visit.        Review of Systems   HENT: Positive for congestion. Negative for hearing loss, nosebleeds and sore throat.         No recent head injury.   Eyes: Negative for double vision.        No new loss of vision.   Respiratory: Positive for cough and wheezing.         No recent lung infections.   Cardiovascular: Negative for chest pain.   Gastrointestinal: Negative for abdominal pain, diarrhea, nausea and vomiting.   Genitourinary: Negative.    Musculoskeletal: Negative.    Skin: Negative.    Neurological: Negative for seizures and headaches.   Endo/Heme/Allergies:        No history of endocrine dysfunction.  No new problems.   Psychiatric/Behavioral: Positive for memory loss. Negative for depression. The patient is not nervous/anxious.         No recent mood changes.          Objective:     There were no vitals taken for this visit.     Physical Exam   Constitutional: He is oriented to person, place, and time. He appears well-developed. No distress.   HENT:   Head: Normocephalic and atraumatic.   Eyes: EOM are normal.   Neck: Normal range of motion.   Cardiovascular: Normal rate and regular rhythm.    Pulmonary/Chest: Effort normal and breath sounds normal. No respiratory distress.   Musculoskeletal: Normal range of motion.   Neurological: He is alert and oriented to person, place, and time. He has normal strength. No cranial nerve deficit. He exhibits normal muscle tone. Coordination normal.   Skin: Skin is warm and dry.   Psychiatric: He has a normal mood and affect.               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  Last EEG on record was in 2011.     Most recently the MOCA test scored  19/30.     He is a primary  for the family as his 2 sons have autism and his wife is ill.  Lengthy conversation with Roque again about driving.  He is to drive on extremely limited basis, to limit highly/freeway driving.  He needs to be awake at least 1 hour prior to driving each time.     Continue Keppra 2000 mg twice a day and Vimpat 200 mg twice a day.  O     Continues coumadin management.    Obtain labs as ordered.    I will ask Dr Rosario to assist in memory testing if they family wishes.    Return for follow-up care in 4 months.  I spent 35 minutes with this patient, over fifty percent was spent counseling patient on their condition, best management practices, reviewing test results and risks and benefits of treatment.     Vimpat is transmitted via [place].

## 2017-10-16 ENCOUNTER — TELEPHONE (OUTPATIENT)
Dept: NEUROLOGY | Facility: MEDICAL CENTER | Age: 62
End: 2017-10-16

## 2017-10-16 ASSESSMENT — ENCOUNTER SYMPTOMS: MEMORY LOSS: 1

## 2017-10-16 NOTE — TELEPHONE ENCOUNTER
Please let Mj know that I would like him to have some fasting/trough labs collected.  Labs are in EPIC.

## 2017-10-20 NOTE — TELEPHONE ENCOUNTER
Called and spoke with patient, he will get a lab appointment set up to ensure that he is able to get labs drawn in the morning fasting/trough.

## 2017-10-23 DIAGNOSIS — I63.9 CEREBROVASCULAR ACCIDENT (CVA), UNSPECIFIED MECHANISM (HCC): ICD-10-CM

## 2017-10-23 DIAGNOSIS — D68.61 ANTIPHOSPHOLIPID SYNDROME (HCC): ICD-10-CM

## 2017-11-01 ENCOUNTER — HOSPITAL ENCOUNTER (OUTPATIENT)
Dept: LAB | Facility: MEDICAL CENTER | Age: 62
End: 2017-11-01
Attending: NURSE PRACTITIONER
Payer: COMMERCIAL

## 2017-11-01 DIAGNOSIS — G40.009 PARTIAL IDIOPATHIC EPILEPSY WITH SEIZURES OF LOCALIZED ONSET, NOT INTRACTABLE, WITHOUT STATUS EPILEPTICUS (HCC): ICD-10-CM

## 2017-11-01 LAB
25(OH)D3 SERPL-MCNC: 26 NG/ML (ref 30–100)
ALBUMIN SERPL BCP-MCNC: 4.1 G/DL (ref 3.2–4.9)
ALBUMIN/GLOB SERPL: 1.4 G/DL
ALP SERPL-CCNC: 85 U/L (ref 30–99)
ALT SERPL-CCNC: 19 U/L (ref 2–50)
ANION GAP SERPL CALC-SCNC: 4 MMOL/L (ref 0–11.9)
AST SERPL-CCNC: 26 U/L (ref 12–45)
BASOPHILS # BLD AUTO: 0.3 % (ref 0–1.8)
BASOPHILS # BLD: 0.02 K/UL (ref 0–0.12)
BILIRUB SERPL-MCNC: 1.7 MG/DL (ref 0.1–1.5)
BUN SERPL-MCNC: 19 MG/DL (ref 8–22)
CALCIUM SERPL-MCNC: 9.7 MG/DL (ref 8.5–10.5)
CHLORIDE SERPL-SCNC: 105 MMOL/L (ref 96–112)
CO2 SERPL-SCNC: 26 MMOL/L (ref 20–33)
CREAT SERPL-MCNC: 1.12 MG/DL (ref 0.5–1.4)
EOSINOPHIL # BLD AUTO: 0.14 K/UL (ref 0–0.51)
EOSINOPHIL NFR BLD: 2.2 % (ref 0–6.9)
ERYTHROCYTE [DISTWIDTH] IN BLOOD BY AUTOMATED COUNT: 39.9 FL (ref 35.9–50)
GFR SERPL CREATININE-BSD FRML MDRD: >60 ML/MIN/1.73 M 2
GLOBULIN SER CALC-MCNC: 2.9 G/DL (ref 1.9–3.5)
GLUCOSE SERPL-MCNC: 102 MG/DL (ref 65–99)
HCT VFR BLD AUTO: 44.2 % (ref 42–52)
HGB BLD-MCNC: 15 G/DL (ref 14–18)
IMM GRANULOCYTES # BLD AUTO: 0.02 K/UL (ref 0–0.11)
IMM GRANULOCYTES NFR BLD AUTO: 0.3 % (ref 0–0.9)
LYMPHOCYTES # BLD AUTO: 1.41 K/UL (ref 1–4.8)
LYMPHOCYTES NFR BLD: 21.9 % (ref 22–41)
MCH RBC QN AUTO: 30.7 PG (ref 27–33)
MCHC RBC AUTO-ENTMCNC: 33.9 G/DL (ref 33.7–35.3)
MCV RBC AUTO: 90.6 FL (ref 81.4–97.8)
MONOCYTES # BLD AUTO: 0.3 K/UL (ref 0–0.85)
MONOCYTES NFR BLD AUTO: 4.7 % (ref 0–13.4)
NEUTROPHILS # BLD AUTO: 4.56 K/UL (ref 1.82–7.42)
NEUTROPHILS NFR BLD: 70.6 % (ref 44–72)
NRBC # BLD AUTO: 0 K/UL
NRBC BLD AUTO-RTO: 0 /100 WBC
PLATELET # BLD AUTO: 211 K/UL (ref 164–446)
PMV BLD AUTO: 11.2 FL (ref 9–12.9)
POTASSIUM SERPL-SCNC: 4.4 MMOL/L (ref 3.6–5.5)
PROT SERPL-MCNC: 7 G/DL (ref 6–8.2)
RBC # BLD AUTO: 4.88 M/UL (ref 4.7–6.1)
SODIUM SERPL-SCNC: 135 MMOL/L (ref 135–145)
WBC # BLD AUTO: 6.5 K/UL (ref 4.8–10.8)

## 2017-11-01 PROCEDURE — 80177 DRUG SCRN QUAN LEVETIRACETAM: CPT

## 2017-11-01 PROCEDURE — 85025 COMPLETE CBC W/AUTO DIFF WBC: CPT

## 2017-11-01 PROCEDURE — 82306 VITAMIN D 25 HYDROXY: CPT

## 2017-11-01 PROCEDURE — 80339 ANTIEPILEPTICS NOS 1-3: CPT

## 2017-11-01 PROCEDURE — 36415 COLL VENOUS BLD VENIPUNCTURE: CPT

## 2017-11-01 PROCEDURE — 80053 COMPREHEN METABOLIC PANEL: CPT

## 2017-11-02 LAB — LEVETIRACETAM SERPL-MCNC: 26 UG/ML (ref 12–46)

## 2017-11-03 LAB — TEST NAME 95000: NORMAL

## 2017-11-17 ENCOUNTER — ANTICOAGULATION VISIT (OUTPATIENT)
Dept: MEDICAL GROUP | Facility: PHYSICIAN GROUP | Age: 62
End: 2017-11-17
Payer: COMMERCIAL

## 2017-11-17 DIAGNOSIS — I63.9 CEREBROVASCULAR ACCIDENT (CVA), UNSPECIFIED MECHANISM (HCC): ICD-10-CM

## 2017-11-17 DIAGNOSIS — Z98.890 S/P MITRAL VALVE REPAIR: ICD-10-CM

## 2017-11-17 LAB — INR PPP: 1.9 (ref 2–3.5)

## 2017-11-17 PROCEDURE — 85610 PROTHROMBIN TIME: CPT | Performed by: FAMILY MEDICINE

## 2017-11-17 PROCEDURE — 99211 OFF/OP EST MAY X REQ PHY/QHP: CPT | Performed by: FAMILY MEDICINE

## 2017-11-17 NOTE — PROGRESS NOTES
Anticoagulation Summary  As of 11/17/2017    INR goal:   2.0-3.0   TTR:   76.4 % (2.3 y)   Today's INR:   1.9!   Maintenance plan:   2 mg (1 mg x 2) on Wed; 3 mg (1 mg x 3) all other days   Weekly total:   20 mg   Plan last modified:   Markell Stanford, PharmD (1/9/2017)   Next INR check:   1/12/2018   Target end date:   Indefinite    Indications    CVA (cerebral vascular accident) (CMS-HCC) [I63.9]  S/P mitral valve repair [Z98.890]  Transient cerebral ischemia (Resolved) [G45.9]             Anticoagulation Episode Summary     INR check location:   Home Draw    Preferred lab:       Send INR reminders to:       Comments:         Anticoagulation Care Providers     Provider Role Specialty Phone number    Dudley Warner M.D. Referring Cardiology [phone]    Clau Carrera, PharmD Responsible          Anticoagulation Patient Findings  Patient Findings     Negatives:   Signs/symptoms of thrombosis, Signs/symptoms of bleeding, Laboratory test error suspected, Change in health, Change in alcohol use, Change in activity, Upcoming invasive procedure, Emergency department visit, Upcoming dental procedure, Missed doses, Extra doses, Change in medications, Change in diet/appetite, Hospital admission, Bruising, Other complaints        HPI:   Roque Patrick seen in clinic today, on anticoagulation therapy with warfarin for stroke prevention due to history of mitral valve repair and past stroke/TIA  Does patient have any changes to current medical/health status since last appt (Y/N):  NO  Does patient have any signs/symptoms of bleeding and/or thrombosis since the last appt (Y/N):  NO  Does patient have any interval changes to diet or medications since last appt (Y/N):  NO  Are there any complications or cost restrictions with current therapy (Y/N):  NO      Vitals:  Patient declines vitals checks at today's visit     Asssessment:  INR slightly subtherapeutic at 1.9, therefore increasing his risk of stroke.      Plan:   Instructed patient to bolus with 4mg X 1, then resume current warfarin regimen as he has been very stable at this dose, which has prevented recurrent stroke given history.     Follow up:  Because warfarin is a high risk medication and current CHEST guidelines recommend regular monitoring intervals (few days up to 12 weeks), will have patient return to clinic in 8 weeks to recheck INR.    Markell Stanford, PharmD

## 2018-01-12 ENCOUNTER — ANTICOAGULATION VISIT (OUTPATIENT)
Dept: MEDICAL GROUP | Facility: PHYSICIAN GROUP | Age: 63
End: 2018-01-12
Payer: COMMERCIAL

## 2018-01-12 VITALS — HEART RATE: 82 BPM | DIASTOLIC BLOOD PRESSURE: 72 MMHG | SYSTOLIC BLOOD PRESSURE: 95 MMHG

## 2018-01-12 DIAGNOSIS — Z98.890 S/P MITRAL VALVE REPAIR: ICD-10-CM

## 2018-01-12 DIAGNOSIS — I63.9 CEREBROVASCULAR ACCIDENT (CVA), UNSPECIFIED MECHANISM (HCC): ICD-10-CM

## 2018-01-12 LAB — INR PPP: 2.9 (ref 2–3.5)

## 2018-01-12 PROCEDURE — 85610 PROTHROMBIN TIME: CPT | Performed by: FAMILY MEDICINE

## 2018-01-12 PROCEDURE — 99999 PR NO CHARGE: CPT | Performed by: FAMILY MEDICINE

## 2018-01-12 NOTE — PROGRESS NOTES
Anticoagulation Summary  As of 1/12/2018    INR goal:   2.0-3.0   TTR:   77.3 % (2.4 y)   Today's INR:   2.9   Maintenance plan:   2 mg (1 mg x 2) on Wed; 3 mg (1 mg x 3) all other days   Weekly total:   20 mg   Plan last modified:   Markell Stanford, PharmD (1/9/2017)   Next INR check:   3/9/2018   Target end date:   Indefinite    Indications    CVA (cerebral vascular accident) (CMS-HCC) [I63.9]  S/P mitral valve repair [Z98.890]  Transient cerebral ischemia (Resolved) [G45.9]             Anticoagulation Episode Summary     INR check location:   Home Draw    Preferred lab:       Send INR reminders to:       Comments:         Anticoagulation Care Providers     Provider Role Specialty Phone number    Dudley Warner M.D. Referring Cardiology [phone]    Clau Carrera, PharmD Responsible          Anticoagulation Patient Findings  Patient Findings     Negatives:   Signs/symptoms of thrombosis, Signs/symptoms of bleeding, Laboratory test error suspected, Change in health, Change in alcohol use, Change in activity, Upcoming invasive procedure, Emergency department visit, Upcoming dental procedure, Missed doses, Extra doses, Change in medications, Change in diet/appetite, Hospital admission, Bruising, Other complaints        HPI:   Roque Nguyen seen in clinic today, on anticoagulation therapy with warfarin for stroke prevention due to history of CVA.    Patient's previous INR was subtherapeutic at 1.9 on 11-17-17, at which time patient was instructed to bolus with warfarin one time, then continue with current warfarin.  He returns to clinic today to recheck INR to ensure it is therapeutic and thus preventing possible clotting and/or bleeding/bruising complications.    CHADS-VASc = n/a  (unadjusted ischemic stroke risk/year:  n/a)    Does patient have any changes to current medical/health status since last appt (Y/N):  NO  Does patient have any signs/symptoms of bleeding and/or thrombosis since the last appt  "(Y/N):  NO  Does patient have any interval changes to diet or medications since last appt (Y/N):  NO  Are there any complications or cost restrictions with current therapy (Y/N):  NO      Vitals:  BP 95/72  HR 82    Weight  declines   Height   5' 6\"     Asssessment:      INR therapeutic at 2.9, therefore decreasing risk of stroke and/or bleeding complications.   Reason(s) for out of range INR today:  n/a      Plan:  Pt is to continue with current warfarin dosing regimen as this has maintained INR in therapeutic range for some time.     Follow up:  Because warfarin is a high risk medication and current CHEST guidelines recommend regular monitoring intervals (few days up to 12 weeks), will have patient return to clinic in 8 weeks to recheck INR.    Markell Stanford, PharmD    "

## 2018-01-22 ENCOUNTER — TELEPHONE (OUTPATIENT)
Dept: URGENT CARE | Facility: PHYSICIAN GROUP | Age: 63
End: 2018-01-22

## 2018-01-22 ENCOUNTER — OFFICE VISIT (OUTPATIENT)
Dept: URGENT CARE | Facility: PHYSICIAN GROUP | Age: 63
End: 2018-01-22
Payer: COMMERCIAL

## 2018-01-22 ENCOUNTER — HOSPITAL ENCOUNTER (OUTPATIENT)
Dept: RADIOLOGY | Facility: MEDICAL CENTER | Age: 63
End: 2018-01-22
Attending: NURSE PRACTITIONER
Payer: COMMERCIAL

## 2018-01-22 VITALS
OXYGEN SATURATION: 94 % | SYSTOLIC BLOOD PRESSURE: 122 MMHG | BODY MASS INDEX: 32.62 KG/M2 | RESPIRATION RATE: 16 BRPM | HEART RATE: 82 BPM | TEMPERATURE: 97.3 F | HEIGHT: 66 IN | WEIGHT: 203 LBS | DIASTOLIC BLOOD PRESSURE: 68 MMHG

## 2018-01-22 DIAGNOSIS — R14.0 ABDOMINAL BLOATING: ICD-10-CM

## 2018-01-22 DIAGNOSIS — Z23 NEED FOR INFLUENZA VACCINATION: ICD-10-CM

## 2018-01-22 PROCEDURE — 99214 OFFICE O/P EST MOD 30 MIN: CPT | Performed by: NURSE PRACTITIONER

## 2018-01-22 PROCEDURE — 76857 US EXAM PELVIC LIMITED: CPT

## 2018-01-22 ASSESSMENT — ENCOUNTER SYMPTOMS
CHILLS: 0
FEVER: 0
ROS GI COMMENTS: ABDOMINAL SWELLING

## 2018-01-22 NOTE — PROGRESS NOTES
Subjective:      Roque Nguyen is a 62 y.o. male who presents with Bloating (of the lower abd x 1 month)    Past Medical History:   Diagnosis Date   • Antiphospholipid antibody syndrome (CMS-HCC) 2007    Followed by rheumatology   • ASTHMA    • Breath shortness 4/21/15    being worked up for cardiac issue, pt states not an issue right now   • Cancer (CMS-HCC) 2014    Prostate cancer   • Chest pain April 2015    Coronary angiogram with 20% stenosis of proximal RCA, otherwise normal coronary arteries; LVEF 35-40%.   • Chronic anticoagulation    • Clotting disorder (CMS-HCC)    • Dental disorder     Broken   • Hypertension     On Lisinopril   • LBBB (left bundle branch block)    • LV dysfunction April 2015    Echocardiogram with normal LV size, LVEF 40%. Severely dilated LA. Severe MR, mild TR. RVSP 45mmHg   • Mitral regurgitation April 2015    Echocardiogram with severe MR   • Personal history of venous thrombosis and embolism     anti phosphorus lipid syndrome.   • S/P mitral valve repair    • Seizure disorder (CMS-HCC) 2007    Initial diagnosis, last seizure in 2012; followed by neurology, on [facility].   • Snoring    • TIA 2007/2008   • TIA (transient ischemic attack) 2007/2008    Antiphospholipid antibody   • Tremors of nervous system     mostly left sided     Social History     Social History   • Marital status:      Spouse name: N/A   • Number of children: N/A   • Years of education: N/A     Occupational History   • Not on file.     Social History Main Topics   • Smoking status: Former Smoker     Packs/day: 0.50     Types: Cigarettes     Start date: 7/29/1985     Quit date: 7/29/2012   • Smokeless tobacco: Never Used      Comment: 1 pk a day for 20 plus yrs   • Alcohol use 0.0 oz/week      Comment: rum and coke occasionally   • Drug use: No   • Sexual activity: Yes     Partners: Female      Comment: , retired [employer]     Other Topics Concern   • Not on file     Social History Narrative   • No  "narrative on file     Family History   Problem Relation Age of Onset   • Cancer Paternal Uncle    • Lung Disease Mother    • Heart Disease Mother    • Hypertension Mother    • Diabetes Father    • Heart Disease Father    • Psychiatry Sister    • Hypertension Sister    • Lung Disease Paternal Aunt    • Hypertension Paternal Aunt    • Psychiatry Maternal Grandfather    • Stroke Neg Hx      Allergies: Patient has no known allergies.    Patient is a 62-year-old male who comes in today with complaint of swelling over the lower abdominal/pelvic area. He states he noticed this over the last month and became concerned because it is not resolving. He denies any increase in size. He does have a significant history for inguinal hernia repair on the left side. This was done in 2010.        Other   This is a new problem. The current episode started 1 to 4 weeks ago. The problem occurs constantly. The problem has been unchanged. Pertinent negatives include no chills or fever. Nothing aggravates the symptoms. He has tried nothing for the symptoms. The treatment provided no relief.       Review of Systems   Constitutional: Negative for chills and fever.   Gastrointestinal:        Abdominal swelling   All other systems reviewed and are negative.         Objective:     /68   Pulse 82   Temp 36.3 °C (97.3 °F)   Resp 16   Ht 1.676 m (5' 6\")   Wt 92.1 kg (203 lb)   SpO2 94%   BMI 32.77 kg/m²      Physical Exam   Constitutional: He is oriented to person, place, and time. He appears well-developed.   Abdominal:       Localized swelling to the right lower abdomen/pelvis area.  No tenderness to palpation.    Neurological: He is alert and oriented to person, place, and time.   Skin: Skin is warm and dry. Capillary refill takes less than 2 seconds.   Psychiatric: He has a normal mood and affect. His behavior is normal. Judgment and thought content normal.   Vitals reviewed.    US abd:       1/22/2018 4:11 PM    HISTORY/REASON FOR " EXAM:  Swelling  Pain    TECHNIQUE/EXAM DESCRIPTION AND NUMBER OF VIEWS: Right inguinal ultrasound with and without Valsalva maneuver.    COMPARISON: None    FINDINGS:    Evaluation was performed at rest supine, standing and with Valsalva. There is a fat-containing direct right inguinal hernia which is reducible. No hernia was seen on the left.     Impression       Fat-containing reducible right inguinal hernia.               Assessment/Plan:     1. Abdominal bloating    - US-PELVIC TRANSABDOMINAL; Future  -Consider referral to general surgeon if indicated.   -REferral placed      2. Need for influenza vaccination    - Flu Quad Inj >3 Year Pre-Filled PF

## 2018-01-23 NOTE — TELEPHONE ENCOUNTER
Patient notified by phone of ultrasound results. Results do show presence of a fat-containing hernia in the right groin. Patient verbalized understanding and agreement. Referral placed to general surgery for evaluation and treatment. No further questions or concerns at this time.

## 2018-01-26 ENCOUNTER — NON-PROVIDER VISIT (OUTPATIENT)
Dept: NEUROLOGY | Facility: MEDICAL CENTER | Age: 63
End: 2018-01-26
Payer: COMMERCIAL

## 2018-01-26 DIAGNOSIS — R41.3 MEMORY LOSS: ICD-10-CM

## 2018-01-26 PROCEDURE — 95951 PR EEG MONITORING/VIDEORECORD: CPT | Mod: 52 | Performed by: PSYCHIATRY & NEUROLOGY

## 2018-01-27 NOTE — PROCEDURES
VIDEO ELECTROENCEPHALOGRAM REPORT        Referring provider: CRIS Carrera.      DOS: 1/26/2018 (total recording of 24 minutes).      INDICATION:  Roque Nguyen 62 y.o. male presenting with history of seizures. DMV clearance.      CURRENT ANTIEPILEPTIC REGIMEN: Levetiracetam 2000 mg twice a day and Lacosamide 200 mg twice a day.     TECHNIQUE: 30 channel video electroencephalogram (EEG) was performed in accordance with the international 10-20 system. The study was reviewed in bipolar and referential montages. The recording examined the patient during wakeful and drowsy/sleep state(s).      DESCRIPTION OF THE RECORD:  During the wakefulness, the background showed a symmetrical 9 Hz alpha activity posteriorly with amplitude of 70 mV.  There was reactivity to eye closure/opening.  A normal anterior-posterior gradient was noted with faster beta frequencies seen anteriorly.  During drowsiness, theta/delta frequencies were seen.     During the sleep state, background shows diffuse high-amplitude 4-5 Hz delta activity.  Symmetrical high-amplitude sleep spindles and vertex sharps were seen in the leads over the central regions.      ACTIVATION PROCEDURES:   Intermittent Photic stimulation was  performed in a stepwise fashion from 1 to 30 Hz, but failed to produce any significant background changes.      ICTAL AND/OR INTERICTAL FINDINGS:   Frequent left temporal sharps and intermittent left temporal slowing noted. No clinical events or seizures were reported or recorded during the study.                EKG: sampling of the EKG recording demonstrated sinus rhythm.        INTERPRETATION:  This is an abnormal video EEG recording in the awake, drowsy, and sleep state(s). Frequent left temporal sharps and intermittent left temporal slowing noted. The findings increase risk for seizures and suggest underlying area of cortical irritability and structural abnormality. However, no clinical events or seizures were  reported or recorded during the study.  Clinical and radiological correlation is recommended.        Matt Hayes MD  Medical Director, Epilepsy and Neurodiagnostics.   Clinical  of Neurology [facility] [employer].   Diplomate in Neurology, Epilepsy, and Electrodiagnostic Medicine.   Office: [phone]  Fax: [phone]    [place]    DD:  01/26/2018 18:11:33  DT:  01/26/2018 19:39:59    D#:  [id number]  Job#:  [id number]    cc: TEZ Carrera

## 2018-01-27 NOTE — PROGRESS NOTES
VIDEO ELECTROENCEPHALOGRAM REPORT      Referring provider: CRIS Carrera.     DOS: 1/26/2018 (total recording of 24 minutes).     INDICATION:  Roque Nguyen 62 y.o. male presenting with history of seizures. DMV clearance.     CURRENT ANTIEPILEPTIC REGIMEN: Levetiracetam 2000 mg twice a day and Lacosamide 200 mg twice a day.    TECHNIQUE: 30 channel video electroencephalogram (EEG) was performed in accordance with the international 10-20 system. The study was reviewed in bipolar and referential montages. The recording examined the patient during wakeful and drowsy/sleep state(s).     DESCRIPTION OF THE RECORD:  During the wakefulness, the background showed a symmetrical 9 Hz alpha activity posteriorly with amplitude of 70 mV.  There was reactivity to eye closure/opening.  A normal anterior-posterior gradient was noted with faster beta frequencies seen anteriorly.  During drowsiness, theta/delta frequencies were seen.    During the sleep state, background shows diffuse high-amplitude 4-5 Hz delta activity.  Symmetrical high-amplitude sleep spindles and vertex sharps were seen in the leads over the central regions.     ACTIVATION PROCEDURES:   Intermittent Photic stimulation was  performed in a stepwise fashion from 1 to 30 Hz, but failed to produce any significant background changes.     ICTAL AND/OR INTERICTAL FINDINGS:   Frequent left temporal sharps and intermittent left temporal slowing noted. No clinical events or seizures were reported or recorded during the study.             EKG: sampling of the EKG recording demonstrated sinus rhythm.      INTERPRETATION:  This is an abnormal video EEG recording in the awake, drowsy, and sleep state(s). Frequent left temporal sharps and intermittent left temporal slowing noted. The findings increase risk for seizures and suggest underlying area of cortical irritability and structural abnormality. However, no clinical events or seizures were reported or recorded  during the study.  Clinical and radiological correlation is recommended.      Matt Hayes MD  Medical Director, Epilepsy and Neurodiagnostics.   Clinical  of Neurology [facility] [employer].   Diplomate in Neurology, Epilepsy, and Electrodiagnostic Medicine.   Office: [phone]  Fax: [phone]

## 2018-01-29 ENCOUNTER — TELEPHONE (OUTPATIENT)
Dept: NEUROLOGY | Facility: MEDICAL CENTER | Age: 63
End: 2018-01-29

## 2018-02-14 ENCOUNTER — OFFICE VISIT (OUTPATIENT)
Dept: CARDIOLOGY | Facility: MEDICAL CENTER | Age: 63
End: 2018-02-14
Payer: COMMERCIAL

## 2018-02-14 VITALS
OXYGEN SATURATION: 96 % | DIASTOLIC BLOOD PRESSURE: 62 MMHG | SYSTOLIC BLOOD PRESSURE: 100 MMHG | BODY MASS INDEX: 32.3 KG/M2 | WEIGHT: 201 LBS | HEIGHT: 66 IN | HEART RATE: 66 BPM

## 2018-02-14 DIAGNOSIS — I51.9 LV DYSFUNCTION: ICD-10-CM

## 2018-02-14 DIAGNOSIS — I50.9 CONGESTIVE HEART FAILURE, UNSPECIFIED CONGESTIVE HEART FAILURE CHRONICITY, UNSPECIFIED CONGESTIVE HEART FAILURE TYPE: ICD-10-CM

## 2018-02-14 DIAGNOSIS — I44.7 LBBB (LEFT BUNDLE BRANCH BLOCK): ICD-10-CM

## 2018-02-14 DIAGNOSIS — I63.9 CEREBROVASCULAR ACCIDENT (CVA), UNSPECIFIED MECHANISM (HCC): ICD-10-CM

## 2018-02-14 DIAGNOSIS — I50.22 CHRONIC SYSTOLIC CONGESTIVE HEART FAILURE, NYHA CLASS 2 (HCC): ICD-10-CM

## 2018-02-14 DIAGNOSIS — Z79.01 CHRONIC ANTICOAGULATION: Chronic | ICD-10-CM

## 2018-02-14 DIAGNOSIS — Z98.890 S/P MITRAL VALVE REPAIR: Chronic | ICD-10-CM

## 2018-02-14 PROCEDURE — 99214 OFFICE O/P EST MOD 30 MIN: CPT | Performed by: INTERNAL MEDICINE

## 2018-02-14 RX ORDER — CARVEDILOL 12.5 MG/1
12.5 TABLET ORAL 2 TIMES DAILY WITH MEALS
Qty: 180 TAB | Refills: 3 | Status: SHIPPED | OUTPATIENT
Start: 2018-02-14 | End: 2019-04-08 | Stop reason: SDUPTHER

## 2018-02-14 RX ORDER — LISINOPRIL 10 MG/1
10 TABLET ORAL
Qty: 90 TAB | Refills: 3 | Status: SHIPPED | OUTPATIENT
Start: 2018-02-14 | End: 2019-04-04 | Stop reason: SDUPTHER

## 2018-02-14 NOTE — PATIENT INSTRUCTIONS
Please look into the following diets and incorporate them into your diet    FOR TREATMENT OR PREVENTION OF CORONARY ARTERY DISEASE    [facility]    Dr Don Cadet over [place] (book and documentary)    Dr Wyatt Pearson's Cardiologist    DASH DIET - [employer] for treatment of HYPERTENSION

## 2018-02-16 ENCOUNTER — TELEPHONE (OUTPATIENT)
Dept: NEUROLOGY | Facility: MEDICAL CENTER | Age: 63
End: 2018-02-16

## 2018-02-16 ASSESSMENT — ENCOUNTER SYMPTOMS
CHILLS: 0
BRUISES/BLEEDS EASILY: 0
SHORTNESS OF BREATH: 0
PALPITATIONS: 0
CLAUDICATION: 0
COUGH: 0
FEVER: 0
ABDOMINAL PAIN: 0
BLURRED VISION: 0
FALLS: 0
FOCAL WEAKNESS: 0
WEAKNESS: 0
PND: 0
DIZZINESS: 0
SORE THROAT: 0
NAUSEA: 0

## 2018-02-16 NOTE — TELEPHONE ENCOUNTER
Patient's wife phoned asking after patient's EEG results. EEG test was completed 1/26. Please advise.

## 2018-02-16 NOTE — TELEPHONE ENCOUNTER
INTERPRETATION:  This is an abnormal video EEG recording in the awake, drowsy, and sleep state(s). Frequent left temporal sharps and intermittent left temporal slowing noted. The findings increase risk for seizures and suggest underlying area of cortical irritability and structural abnormality. However, no clinical events or seizures were reported or recorded during the study.  Clinical and radiological correlation is recommended.    **Would they be willing to have a Brain MRI?  The last one was in 2009.

## 2018-02-17 NOTE — PROGRESS NOTES
Subjective:   Roque Nguyen is a 62 y.o. male who presents today for follow-up of his history of mitral valve repair which was complicated by stroke with underlying antiphospholipid antibodies    He's been doing well since last year his weight is stable from before    His wife has went through major illness which is causing him stress they are finally doing better  Past Medical History:   Diagnosis Date   • Antiphospholipid antibody syndrome (CMS-HCC) 2007    Followed by rheumatology   • ASTHMA    • Breath shortness 4/21/15    being worked up for cardiac issue, pt states not an issue right now   • Cancer (CMS-HCC) 2014    Prostate cancer   • Chest pain April 2015    Coronary angiogram with 20% stenosis of proximal RCA, otherwise normal coronary arteries; LVEF 35-40%.   • Chronic anticoagulation    • Clotting disorder (CMS-HCC)    • Dental disorder     Broken   • Hypertension     On Lisinopril   • LBBB (left bundle branch block)    • LV dysfunction April 2015    Echocardiogram with normal LV size, LVEF 40%. Severely dilated LA. Severe MR, mild TR. RVSP 45mmHg   • Mitral regurgitation April 2015    Echocardiogram with severe MR   • Personal history of venous thrombosis and embolism     anti phosphorus lipid syndrome.   • S/P mitral valve repair    • Seizure disorder (CMS-HCC) 2007    Initial diagnosis, last seizure in 2012; followed by neurology, on [facility].   • Snoring    • TIA 2007/2008   • TIA (transient ischemic attack) 2007/2008    Antiphospholipid antibody   • Tremors of nervous system     mostly left sided     Past Surgical History:   Procedure Laterality Date   • MITRAL VALVE REPAIR  7/20/2015    Procedure: MITRAL VALVE REPAIR  with MICHAEL;  Surgeon: Gina Sim M.D.;  Location: SURGERY [facility];  Service:    • RECOVERY  5/15/2015    Procedure: [id number]  MITRAL REGURGITATION;  Surgeon: Ir-Recovery Surgery;  Location: SURGERY SAME DAY [facility]S;  Service:    • INGUINAL HERNIA REPAIR   4/20/2010    Performed by EJ STILL at SURGERY SAME DAY [facility]   • CHOLECYSTECTOMY       Family History   Problem Relation Age of Onset   • Cancer Paternal Uncle    • Lung Disease Mother    • Heart Disease Mother    • Hypertension Mother    • Diabetes Father    • Heart Disease Father    • Psychiatry Sister    • Hypertension Sister    • Lung Disease Paternal Aunt    • Hypertension Paternal Aunt    • Psychiatry Maternal Grandfather    • Stroke Neg Hx      History   Smoking Status   • Former Smoker   • Packs/day: 0.50   • Types: Cigarettes   • Start date: 7/29/1985   • Quit date: 7/29/2012   Smokeless Tobacco   • Never Used     Comment: 1 pk a day for 20 plus yrs     No Known Allergies  Outpatient Encounter Prescriptions as of 2/14/2018   Medication Sig Dispense Refill   • lisinopril (PRINIVIL) 10 MG Tab Take 1 Tab by mouth every day. 90 Tab 3   • carvedilol (COREG) 12.5 MG Tab Take 1 Tab by mouth 2 times a day, with meals. 180 Tab 3   • warfarin (COUMADIN) 1 MG Tab TAKE TWO TO THREE TABLETS BY MOUTH DAILY AS [facility] BY [facility] 270 Tab 1   • levetiracetam (KEPPRA) 1000 MG tablet Take 2 Tabs by mouth 2 Times a Day. 120 Tab 11   • lacosamide (VIMPAT) 200 MG Tab tablet Take 200 mg by mouth 2 Times a Day. 180 Tab 1   • multivitamin (THERAGRAN) Tab Take 1 Tab by mouth every day.     • calcium citrate (CALCITRATE) 950 MG Tab Take 950 mg by mouth every day.     • [DISCONTINUED] lisinopril (PRINIVIL) 10 MG Tab Take 1 Tab by mouth every day. 90 Tab 3   • [DISCONTINUED] carvedilol (COREG) 12.5 MG Tab Take 1 Tab by mouth 2 times a day, with meals. 180 Tab 3     No facility-administered encounter medications on file as of 2/14/2018.      Review of Systems   Constitutional: Negative for chills and fever.   HENT: Negative for sore throat.    Eyes: Negative for blurred vision.   Respiratory: Negative for cough and shortness of breath.    Cardiovascular: Negative for chest pain, palpitations,  "claudication, leg swelling and PND.   Gastrointestinal: Negative for abdominal pain and nausea.   Musculoskeletal: Negative for falls and joint pain.   Skin: Negative for rash.   Neurological: Negative for dizziness, focal weakness and weakness.   Endo/Heme/Allergies: Does not bruise/bleed easily.        Objective:   /62   Pulse 66   Ht 1.676 m (5' 6\")   Wt 91.2 kg (201 lb)   SpO2 96%   BMI 32.44 kg/m²     Physical Exam   Constitutional: No distress.   HENT:   Mouth/Throat: Oropharynx is clear and moist.   Eyes: No scleral icterus.   Neck: Neck supple. No JVD present.   Cardiovascular: Normal rate, regular rhythm, normal heart sounds and intact distal pulses.  Exam reveals no gallop and no friction rub.    No murmur heard.  Pulmonary/Chest: Effort normal. He has no rales.   Abdominal: Soft. Bowel sounds are normal. There is no tenderness.   Musculoskeletal: He exhibits no edema.   Neurological: He is alert.   Skin: No rash noted. He is not diaphoretic.   Psychiatric: He has a normal mood and affect.       Assessment:     1. LBBB (left bundle branch block)     2. S/P mitral valve repair     3. Chronic anticoagulation     4. Chronic systolic congestive heart failure, NYHA class 2 (CMS-[facility])     5. Cerebrovascular accident (CVA), unspecified mechanism (CMS-[facility])     6. Congestive heart failure, unspecified congestive heart failure chronicity, unspecified congestive heart failure type (CMS-HCC)  lisinopril (PRINIVIL) 10 MG Tab   7. LV dysfunction  carvedilol (COREG) 12.5 MG Tab       Medical Decision Making:  Today's Assessment / Status / Plan:     It was my pleasure to meet with Mr. Nguyen.    He is doing well with his history of mitral valve repair, I encouraged him to think about the intensive cardiac rehab program    He is on lifelong anticoagulation with his antiphospholipid antibodies    Is done really well with neurology on antiseizure meds    I will see Mr. Nguyen back in 1 year time and encouraged " him to follow up with us over the phone or e-mail using my MyChart as issues arise.    It is my pleasure to participate in the care of Mr. Nguyen.  Please do not hesitate to contact me with questions or concerns.    Dudley Warner MD PhD FAC  Cardiologist [facility]

## 2018-02-26 DIAGNOSIS — G40.009 PARTIAL IDIOPATHIC EPILEPSY WITH SEIZURES OF LOCALIZED ONSET, NOT INTRACTABLE, WITHOUT STATUS EPILEPTICUS (HCC): ICD-10-CM

## 2018-02-26 RX ORDER — LACOSAMIDE 200 MG/1
200 TABLET ORAL 2 TIMES DAILY
Qty: 180 TAB | Refills: 1 | Status: SHIPPED | OUTPATIENT
Start: 2018-02-26 | End: 2018-08-30 | Stop reason: SDUPTHER

## 2018-02-26 NOTE — TELEPHONE ENCOUNTER
Patient needs a paper script that they can mail to their new mail order pharmacy. Could you please print one on RX paper??      Was the patient seen in the last year in this department? Yes  10/13/17    Does patient have an active prescription for medications requested? Yes     Received Request Via: Patient    Next scheduled follow up appointment: 4/16/18

## 2018-02-27 NOTE — TELEPHONE ENCOUNTER
Spoke with patient today relayed his EEG results, he is open to the idea of having another MRI. Please go ahead and put that order in the computer. Patient's wife is wondering if this has anything to do with patient's stroke a few years back or if it is separate.     Patient to come  Vimpat Rx for mail order pharmacy later today or tomorrow.

## 2018-02-28 ENCOUNTER — TELEPHONE (OUTPATIENT)
Dept: NEUROLOGY | Facility: MEDICAL CENTER | Age: 63
End: 2018-02-28

## 2018-02-28 DIAGNOSIS — G40.209 PARTIAL EPILEPSY WITH IMPAIRMENT OF CONSCIOUSNESS (HCC): ICD-10-CM

## 2018-02-28 DIAGNOSIS — I63.9 CEREBROVASCULAR ACCIDENT (CVA), UNSPECIFIED MECHANISM (HCC): ICD-10-CM

## 2018-02-28 NOTE — TELEPHONE ENCOUNTER
Where the abnormalities are on EEG are more than likely associated to the left region of brain impacted by his stroke.  Brain MRI ordered.

## 2018-03-09 ENCOUNTER — ANTICOAGULATION VISIT (OUTPATIENT)
Dept: MEDICAL GROUP | Facility: PHYSICIAN GROUP | Age: 63
End: 2018-03-09
Payer: COMMERCIAL

## 2018-03-09 VITALS — HEART RATE: 105 BPM | SYSTOLIC BLOOD PRESSURE: 86 MMHG | DIASTOLIC BLOOD PRESSURE: 71 MMHG

## 2018-03-09 DIAGNOSIS — Z98.890 S/P MITRAL VALVE REPAIR: ICD-10-CM

## 2018-03-09 DIAGNOSIS — I63.9 CEREBROVASCULAR ACCIDENT (CVA), UNSPECIFIED MECHANISM (HCC): ICD-10-CM

## 2018-03-09 LAB — INR PPP: 3.3 (ref 2–3.5)

## 2018-03-09 PROCEDURE — 85610 PROTHROMBIN TIME: CPT | Performed by: FAMILY MEDICINE

## 2018-03-09 PROCEDURE — 99211 OFF/OP EST MAY X REQ PHY/QHP: CPT | Performed by: FAMILY MEDICINE

## 2018-03-09 NOTE — PROGRESS NOTES
Anticoagulation Summary  As of 3/9/2018    INR goal:   2.0-3.0   TTR:   74.1 % (2.6 y)   Today's INR:   3.3!   Maintenance plan:   2 mg (1 mg x 2) on Wed; 3 mg (1 mg x 3) all other days   Weekly total:   20 mg   Plan last modified:   Markell Stanford, PharmD (1/9/2017)   Next INR check:   4/6/2018   Target end date:   Indefinite    Indications    CVA (cerebral vascular accident) (CMS-HCC) [I63.9]  S/P mitral valve repair [Z98.890]  Transient cerebral ischemia (Resolved) [G45.9]             Anticoagulation Episode Summary     INR check location:   Home Draw    Preferred lab:       Send INR reminders to:       Comments:         Anticoagulation Care Providers     Provider Role Specialty Phone number    Dudley Warner M.D. Referring Cardiology [phone]    Clau Carrera PharmD Responsible          Anticoagulation Patient Findings  Patient Findings     Negatives:   Signs/symptoms of thrombosis, Signs/symptoms of bleeding, Laboratory test error suspected, Change in health, Change in alcohol use, Change in activity, Upcoming invasive procedure, Emergency department visit, Upcoming dental procedure, Missed doses, Extra doses, Change in medications, Change in diet/appetite, Hospital admission, Bruising, Other complaints        HPI:   Roque Nguyen seen in clinic today, on anticoagulation therapy with warfarin for stroke prevention due to history of CVA/TIA and mitral valve repair.    Patient's previous INR was therapeutic at 2.9 on 1-12-18, at which time patient was instructed to continue with current warfarin regimen.  He returns to clinic today to recheck INR to ensure it is therapeutic and thus preventing possible clotting and/or bleeding/bruising complications.    CHADS-VASc = n/a  (unadjusted ischemic stroke risk/year:  n/a)    Does patient have any changes to current medical/health status since last appt (Y/N):  Had a cold the last week or so  Does patient have any signs/symptoms of bleeding and/or  "thrombosis since the last appt (Y/N):  NO  Does patient have any interval changes to diet or medications since last appt (Y/N):  No  Are there any complications or cost restrictions with current therapy (Y/N):  NO      Vitals:  BP 86/71    always runs low, no s/s hypotension   Weight  declines   Height   5' 6\"     Asssessment:      INR supratherapeutic at 3.3, therefore increasing patient's risk of bleeding/bruising complications.   Reason(s) for out of range INR today:  Had a cold this past week, diet was off      Plan:  Instructed patient to decrease today's dose to 1mg, then resume current warfarin regimen in order to bring INR back to therapeutic range.     Follow up:  Because warfarin is a high risk medication and current CHEST guidelines recommend regular monitoring intervals (few days up to 12 weeks), will have patient return to clinic in 4 weeks to recheck INR.    Markell Stanford, PharmD    "

## 2018-04-06 ENCOUNTER — ANTICOAGULATION VISIT (OUTPATIENT)
Dept: MEDICAL GROUP | Facility: PHYSICIAN GROUP | Age: 63
End: 2018-04-06
Payer: COMMERCIAL

## 2018-04-06 VITALS — HEART RATE: 75 BPM | DIASTOLIC BLOOD PRESSURE: 74 MMHG | SYSTOLIC BLOOD PRESSURE: 104 MMHG

## 2018-04-06 DIAGNOSIS — I63.9 CEREBROVASCULAR ACCIDENT (CVA), UNSPECIFIED MECHANISM (HCC): ICD-10-CM

## 2018-04-06 DIAGNOSIS — Z98.890 S/P MITRAL VALVE REPAIR: ICD-10-CM

## 2018-04-06 LAB — INR PPP: 2.4 (ref 2–3.5)

## 2018-04-06 PROCEDURE — 85610 PROTHROMBIN TIME: CPT | Performed by: INTERNAL MEDICINE

## 2018-04-06 NOTE — PROGRESS NOTES
Anticoagulation Summary  As of 4/6/2018    INR goal:   2.0-3.0   TTR:   73.9 % (2.6 y)   Today's INR:   2.4   Maintenance plan:   2 mg (1 mg x 2) on Wed; 3 mg (1 mg x 3) all other days   Weekly total:   20 mg   Plan last modified:   Markell Stanford, PharmD (1/9/2017)   Next INR check:   5/18/2018   Target end date:   Indefinite    Indications    CVA (cerebral vascular accident) (CMS-HCC) [I63.9]  S/P mitral valve repair [Z98.890]  Transient cerebral ischemia (Resolved) [G45.9]             Anticoagulation Episode Summary     INR check location:   Home Draw    Preferred lab:       Send INR reminders to:       Comments:         Anticoagulation Care Providers     Provider Role Specialty Phone number    Dudley Warner M.D. Referring Cardiology [phone]    Clau Carrera, PharmD Responsible          Anticoagulation Patient Findings  Patient Findings     Negatives:   Signs/symptoms of thrombosis, Signs/symptoms of bleeding, Laboratory test error suspected, Change in health, Change in alcohol use, Change in activity, Upcoming invasive procedure, Emergency department visit, Upcoming dental procedure, Missed doses, Extra doses, Change in medications, Change in diet/appetite, Hospital admission, Bruising, Other complaints        HPI:   Roque Nguyen seen in clinic today, on anticoagulation therapy with warfarin for stroke prevention due to history of CVA/TIA and mitral valve repair.    Patient's previous INR was supratherapeutic at 3.3 on 3-9-18, at which time patient was instructed to decrease one dose, then resume current warfarin regimen.  He returns to clinic today to recheck INR to ensure it is therapeutic and thus preventing possible clotting and/or bleeding/bruising complications.    CHADS-VASc = n/a  (unadjusted ischemic stroke risk/year:  n/a)    Does patient have any changes to current medical/health status since last appt (Y/N):  NO  Does patient have any signs/symptoms of bleeding and/or thrombosis since  "the last appt (Y/N):  NO  Does patient have any interval changes to diet or medications since last appt (Y/N):  NO  Are there any complications or cost restrictions with current therapy (Y/N):  NO      Vitals:  /74  HR 75    Weight  declines   Height   5' 6\"     Asssessment:      INR therapeutic at 2.4, therefore decreasing patient's risk of stroke and/or bleeding complications.   Reason(s) for out of range INR today:  n/a      Plan:  Pt is to continue with current warfarin dosing regimen as this dose continues to maintain INR in therapeutic range.     Follow up:  Because warfarin is a high risk medication and current CHEST guidelines recommend regular monitoring intervals (few days up to 12 weeks), will have patient return to clinic in 6 weeks to recheck INR.    Markell Stanford, PharmD    "

## 2018-04-16 ENCOUNTER — OFFICE VISIT (OUTPATIENT)
Dept: NEUROLOGY | Facility: MEDICAL CENTER | Age: 63
End: 2018-04-16
Payer: COMMERCIAL

## 2018-04-16 VITALS
SYSTOLIC BLOOD PRESSURE: 90 MMHG | DIASTOLIC BLOOD PRESSURE: 50 MMHG | HEART RATE: 76 BPM | BODY MASS INDEX: 32.78 KG/M2 | HEIGHT: 66 IN | WEIGHT: 204 LBS

## 2018-04-16 DIAGNOSIS — I63.9 CEREBROVASCULAR ACCIDENT (CVA), UNSPECIFIED MECHANISM (HCC): ICD-10-CM

## 2018-04-16 DIAGNOSIS — R41.89 IMPAIRED COGNITION: ICD-10-CM

## 2018-04-16 DIAGNOSIS — Z91.89 AT RISK FOR OSTEOPENIA: ICD-10-CM

## 2018-04-16 DIAGNOSIS — G40.009 PARTIAL IDIOPATHIC EPILEPSY WITH SEIZURES OF LOCALIZED ONSET, NOT INTRACTABLE, WITHOUT STATUS EPILEPTICUS (HCC): ICD-10-CM

## 2018-04-16 PROCEDURE — 99214 OFFICE O/P EST MOD 30 MIN: CPT | Performed by: NURSE PRACTITIONER

## 2018-04-16 ASSESSMENT — ENCOUNTER SYMPTOMS
ABDOMINAL PAIN: 0
COUGH: 0
NERVOUS/ANXIOUS: 0
DIARRHEA: 0
CONSTITUTIONAL NEGATIVE: 1
DEPRESSION: 0
MEMORY LOSS: 1
DOUBLE VISION: 0
VOMITING: 0
SORE THROAT: 0
SEIZURES: 0
MUSCULOSKELETAL NEGATIVE: 1
HEADACHES: 0
NAUSEA: 0

## 2018-05-14 ENCOUNTER — OFFICE VISIT (OUTPATIENT)
Dept: MEDICAL GROUP | Facility: PHYSICIAN GROUP | Age: 63
End: 2018-05-14
Payer: COMMERCIAL

## 2018-05-14 VITALS
HEIGHT: 66 IN | SYSTOLIC BLOOD PRESSURE: 110 MMHG | DIASTOLIC BLOOD PRESSURE: 78 MMHG | OXYGEN SATURATION: 93 % | BODY MASS INDEX: 32.47 KG/M2 | HEART RATE: 89 BPM | TEMPERATURE: 97 F | WEIGHT: 202 LBS | RESPIRATION RATE: 16 BRPM

## 2018-05-14 DIAGNOSIS — D68.61 ANTIPHOSPHOLIPID SYNDROME (HCC): ICD-10-CM

## 2018-05-14 DIAGNOSIS — E66.9 OBESITY (BMI 30-39.9): ICD-10-CM

## 2018-05-14 DIAGNOSIS — C80.1 CANCER (HCC): ICD-10-CM

## 2018-05-14 DIAGNOSIS — Z23 NEED FOR VACCINATION: ICD-10-CM

## 2018-05-14 DIAGNOSIS — I63.9 CEREBROVASCULAR ACCIDENT (CVA), UNSPECIFIED MECHANISM (HCC): ICD-10-CM

## 2018-05-14 PROBLEM — G40.909 EPILEPSY (HCC): Status: ACTIVE | Noted: 2018-05-14

## 2018-05-14 PROCEDURE — 99214 OFFICE O/P EST MOD 30 MIN: CPT | Mod: 25 | Performed by: INTERNAL MEDICINE

## 2018-05-14 PROCEDURE — 90471 IMMUNIZATION ADMIN: CPT | Performed by: INTERNAL MEDICINE

## 2018-05-14 PROCEDURE — 90715 TDAP VACCINE 7 YRS/> IM: CPT | Performed by: INTERNAL MEDICINE

## 2018-05-14 ASSESSMENT — PATIENT HEALTH QUESTIONNAIRE - PHQ9: CLINICAL INTERPRETATION OF PHQ2 SCORE: 0

## 2018-05-14 NOTE — ASSESSMENT & PLAN NOTE
Had a stroke after his mitral valve repair. They were supposed to follow up with a rheumatologist but haven't scheduled yet. Used to follow with [facility] but that provider left.

## 2018-05-14 NOTE — ASSESSMENT & PLAN NOTE
Has history of prostate cancer in 2014, has done radiation. He hasn't followed up since and his wife is concerned about this. Roque doesn't have any memory of this treatment. He had seen Dr. Jimenez.

## 2018-05-14 NOTE — PROGRESS NOTES
PRIMARY CARE CLINIC NEW PATIENT H&P  Chief Complaint   Patient presents with   • Immunizations     Tdap    • Congestive Heart Failure     Med Management    • Hypertension     Med Management        History of Present Illness     Roque presents with his wife to establish care:     Antiphospholipid syndrome  Had a stroke after his mitral valve repair. They were supposed to follow up with a rheumatologist but haven't scheduled yet. Used to follow with [facility] at [facility] but that provider left.     CVA (cerebral vascular accident)  Post mitral valve repair surgery in 7/2015, says his memory hasn't been as good since.     Epilepsy (HCC)  Following with Nadege Long, first episode was September 2007. Controlled on vimpat and keppra. He is being set up for MRI per neurology since recent EEG showed that he's at risk for another seizure even on his current regimen.     Cancer (HCC)  Has history of prostate cancer in 2014, has done radiation. He hasn't followed up since and his wife is concerned about this. Roque doesn't have any memory of this treatment. He had seen Dr. Jimenez.     Current Outpatient Prescriptions   Medication Sig Dispense Refill   • lacosamide (VIMPAT) 200 MG Tab tablet Take 200 mg by mouth 2 Times a Day for 181 days. 180 Tab 1   • lisinopril (PRINIVIL) 10 MG Tab Take 1 Tab by mouth every day. 90 Tab 3   • carvedilol (COREG) 12.5 MG Tab Take 1 Tab by mouth 2 times a day, with meals. 180 Tab 3   • warfarin (COUMADIN) 1 MG Tab TAKE TWO TO THREE TABLETS BY MOUTH DAILY AS DIRECTED BY [facility] 270 Tab 1   • levetiracetam (KEPPRA) 1000 MG tablet Take 2 Tabs by mouth 2 Times a Day. 120 Tab 11   • multivitamin (THERAGRAN) Tab Take 1 Tab by mouth every day.     • calcium citrate (CALCITRATE) 950 MG Tab Take 950 mg by mouth every day.       No current facility-administered medications for this visit.        Past Medical History:   Diagnosis Date   • Antiphospholipid  syndrome ([facility]) 3/15/2010   • ASTHMA    • Cancer ([facility]) 2014    Prostate cancer   • Chronic anticoagulation    • CVA (cerebral vascular accident) ([facility]) 7/28/2015   • Dental disorder     Broken   • Epilepsy ([facility]) 5/14/2018   • Hypertension     On Lisinopril   • LBBB (left bundle branch block)    • LV dysfunction April 2015    Echocardiogram with normal LV size, LVEF 40%. Severely dilated LA. Severe MR, mild TR. RVSP 45mmHg   • Mitral regurgitation April 2015    Echocardiogram with severe MR   • S/P mitral valve repair    • Seizure disorder ([facility]) 2007    Initial diagnosis, last seizure in 2012[facility]   • Snoring    • TIA (transient ischemic attack) 2007/2008    Antiphospholipid antibody   • Tremors of nervous system     mostly left sided     Past Surgical History:   Procedure Laterality Date   • MITRAL VALVE REPAIR  7/20/2015    Procedure: MITRAL VALVE REPAIR  with MICHAEL;  Surgeon: Gina Sim M.D.;  Location: SURGERY [facility];  Service:    • RECOVERY  5/15/2015    Procedure: [id number]  MITRAL REGURGITATION;  Surgeon: Ir-Recovery Surgery;  Location: SURGERY SAME DAY [facility];  Service:    • INGUINAL HERNIA REPAIR  4/20/2010    Performed by EJ STILL at SURGERY SAME DAY [facility]   • CHOLECYSTECTOMY       Social History   Substance Use Topics   • Smoking status: Former Smoker     Packs/day: 1.00     Types: Cigarettes     Start date: 7/29/1985     Quit date: 7/29/2012   • Smokeless tobacco: Never Used   • Alcohol use 0.0 oz/week      Comment: beer occasionally     Social History     Social History Narrative    Retired from [employer]     Family History   Problem Relation Age of Onset   • Cancer Paternal Uncle    • Lung Disease Mother    • Heart Disease Mother    • Hypertension Mother    • Diabetes Father    • Heart Disease Father    • Psychiatry Sister    • Hypertension Sister    • Lung Disease Paternal Aunt    • Hypertension Paternal Aunt    • Psychiatry Maternal  "Grandfather    • Stroke Neg Hx      Family Status   Relation Status   • Paternal Uncle    • Mother    • Father Alive   • Sister Alive   • Maternal Aunt Alive   • Maternal Uncle Alive   • Paternal Aunt Alive   • Maternal Grandmother    • Maternal Grandfather    • Paternal Grandmother    • Paternal Grandfather    • Neg Hx      Allergies: Patient has no known allergies.    ROS  Constitutional: Negative for fatigue/generalized weakness.   HEENT: Negative for  vision changes, hearing changes    Respiratory: Negative for shortness of breath  Cardiovascular: Negative for chest pain, palpitations  Gastrointestinal: Negative for blood in stool, constipation, diarrhea  Genitourinary: Negative for dysuria, polyuria  Musculoskeletal: Negative for myalgias, back pain, and joint pain.   Skin: Negative for rash  Neurological: Negative for numbness, tingling  Psychiatric/Behavioral: Negative for depression, anxiety      Objective   Blood pressure 110/78, pulse 89, temperature 36.1 °C (97 °F), resp. rate 16, height 1.676 m (5' 6\"), weight 91.6 kg (202 lb), SpO2 93 %. Body mass index is 32.6 kg/m².    General: Alert, oriented. In no acute distress   HEET: EOMI, PERRL, conjunctiva non-injected, sclera non-icteric.  Nares patent with no significant congestion or drainage.  Ashley pinnae, external auditory canals, TM pearly gray with normal light reflex bilaterally.Oral mucous membranes pink and moist with no lesions.  Neck: supple with no cervical, subclavicular lymphadenopathy, JVD, palpable thyroid nodules   Lungs: clear to auscultation bilaterally with good excursion.  CV: regular rate and rhythm.  Abdomen soft, non-distended, non-tender with normal bowel sounds. No hepatosplenomegaly, no masses palpated  Skin: no lesions. Warm, dry   Psychiatric: appropriate mood and affect       Assessment and Plan   The following treatment plan was discussed     1. Need for vaccination  - TDAP " VACCINE =>8YO IM    2. Obesity (BMI 30-39.9)  - Patient identified as having weight management issue.  Appropriate orders and counseling given.    3. Antiphospholipid syndrome (HCC)  On coumadin, but knows he needs to follow up with his new rheumatologist.   - COMP METABOLIC PANEL; Future  - CBC WITH DIFFERENTIAL; Future  - LIPID PROFILE; Future    4. Cerebrovascular accident (CVA), unspecified mechanism (HCC)  No residual deficits since stroke after his mitral valve repair but has had some memory difficulties since.     5. Cancer (HCC)  Currently asymptomatic from prostate cancer standpoint but hasn't followed up with his urologist since completing radiation. Will refer back to his urologist and check his PSA.   - REFERRAL TO UROLOGY  - PROSTATE SPECIFIC AG      No Follow-up on file.    Health Maintenance    There are no preventive care reminders to display for this patient.    Tai Fitch MD  Internal Medicine  [facility]

## 2018-05-14 NOTE — ASSESSMENT & PLAN NOTE
Following with Nadege Long, first episode was September 2007. Controlled on vimpat and keppra. He is being set up for MRI per neurology since recent EEG showed that he's at risk for another seizure even on his current regimen.

## 2018-05-15 ENCOUNTER — HOSPITAL ENCOUNTER (OUTPATIENT)
Dept: LAB | Facility: MEDICAL CENTER | Age: 63
End: 2018-05-15
Attending: INTERNAL MEDICINE
Payer: COMMERCIAL

## 2018-05-15 DIAGNOSIS — D68.61 ANTIPHOSPHOLIPID SYNDROME (HCC): ICD-10-CM

## 2018-05-15 LAB
ALBUMIN SERPL BCP-MCNC: 3.6 G/DL (ref 3.2–4.9)
ALBUMIN/GLOB SERPL: 1.3 G/DL
ALP SERPL-CCNC: 59 U/L (ref 30–99)
ALT SERPL-CCNC: 14 U/L (ref 2–50)
ANION GAP SERPL CALC-SCNC: 6 MMOL/L (ref 0–11.9)
AST SERPL-CCNC: 23 U/L (ref 12–45)
BASOPHILS # BLD AUTO: 0.4 % (ref 0–1.8)
BASOPHILS # BLD: 0.02 K/UL (ref 0–0.12)
BILIRUB SERPL-MCNC: 1.6 MG/DL (ref 0.1–1.5)
BUN SERPL-MCNC: 18 MG/DL (ref 8–22)
CALCIUM SERPL-MCNC: 9 MG/DL (ref 8.5–10.5)
CHLORIDE SERPL-SCNC: 106 MMOL/L (ref 96–112)
CHOLEST SERPL-MCNC: 155 MG/DL (ref 100–199)
CO2 SERPL-SCNC: 25 MMOL/L (ref 20–33)
CREAT SERPL-MCNC: 1.08 MG/DL (ref 0.5–1.4)
EOSINOPHIL # BLD AUTO: 0.12 K/UL (ref 0–0.51)
EOSINOPHIL NFR BLD: 2.1 % (ref 0–6.9)
ERYTHROCYTE [DISTWIDTH] IN BLOOD BY AUTOMATED COUNT: 41.2 FL (ref 35.9–50)
GLOBULIN SER CALC-MCNC: 2.7 G/DL (ref 1.9–3.5)
GLUCOSE SERPL-MCNC: 98 MG/DL (ref 65–99)
HCT VFR BLD AUTO: 43.3 % (ref 42–52)
HDLC SERPL-MCNC: 50 MG/DL
HGB BLD-MCNC: 14 G/DL (ref 14–18)
IMM GRANULOCYTES # BLD AUTO: 0.01 K/UL (ref 0–0.11)
IMM GRANULOCYTES NFR BLD AUTO: 0.2 % (ref 0–0.9)
LDLC SERPL CALC-MCNC: 88 MG/DL
LYMPHOCYTES # BLD AUTO: 1.34 K/UL (ref 1–4.8)
LYMPHOCYTES NFR BLD: 23.9 % (ref 22–41)
MCH RBC QN AUTO: 29.7 PG (ref 27–33)
MCHC RBC AUTO-ENTMCNC: 32.3 G/DL (ref 33.7–35.3)
MCV RBC AUTO: 91.7 FL (ref 81.4–97.8)
MONOCYTES # BLD AUTO: 0.3 K/UL (ref 0–0.85)
MONOCYTES NFR BLD AUTO: 5.4 % (ref 0–13.4)
NEUTROPHILS # BLD AUTO: 3.81 K/UL (ref 1.82–7.42)
NEUTROPHILS NFR BLD: 68 % (ref 44–72)
NRBC # BLD AUTO: 0 K/UL
NRBC BLD-RTO: 0 /100 WBC
PLATELET # BLD AUTO: 188 K/UL (ref 164–446)
PMV BLD AUTO: 11.5 FL (ref 9–12.9)
POTASSIUM SERPL-SCNC: 4.3 MMOL/L (ref 3.6–5.5)
PROT SERPL-MCNC: 6.3 G/DL (ref 6–8.2)
PSA SERPL-MCNC: 0.62 NG/ML (ref 0–4)
RBC # BLD AUTO: 4.72 M/UL (ref 4.7–6.1)
SODIUM SERPL-SCNC: 137 MMOL/L (ref 135–145)
TRIGL SERPL-MCNC: 83 MG/DL (ref 0–149)
WBC # BLD AUTO: 5.6 K/UL (ref 4.8–10.8)

## 2018-05-15 PROCEDURE — 36415 COLL VENOUS BLD VENIPUNCTURE: CPT

## 2018-05-15 PROCEDURE — 80053 COMPREHEN METABOLIC PANEL: CPT

## 2018-05-15 PROCEDURE — 84153 ASSAY OF PSA TOTAL: CPT

## 2018-05-15 PROCEDURE — 85025 COMPLETE CBC W/AUTO DIFF WBC: CPT

## 2018-05-15 PROCEDURE — 80061 LIPID PANEL: CPT

## 2018-05-18 ENCOUNTER — ANTICOAGULATION VISIT (OUTPATIENT)
Dept: MEDICAL GROUP | Facility: PHYSICIAN GROUP | Age: 63
End: 2018-05-18
Payer: COMMERCIAL

## 2018-05-18 VITALS — HEART RATE: 73 BPM | DIASTOLIC BLOOD PRESSURE: 79 MMHG | SYSTOLIC BLOOD PRESSURE: 109 MMHG

## 2018-05-18 DIAGNOSIS — Z98.890 S/P MITRAL VALVE REPAIR: ICD-10-CM

## 2018-05-18 DIAGNOSIS — I63.9 CEREBROVASCULAR ACCIDENT (CVA), UNSPECIFIED MECHANISM (HCC): ICD-10-CM

## 2018-05-18 LAB — INR PPP: 3.2 (ref 2–3.5)

## 2018-05-18 PROCEDURE — 99211 OFF/OP EST MAY X REQ PHY/QHP: CPT | Performed by: FAMILY MEDICINE

## 2018-05-18 PROCEDURE — 85610 PROTHROMBIN TIME: CPT | Performed by: FAMILY MEDICINE

## 2018-05-18 NOTE — PROGRESS NOTES
Anticoagulation Summary  As of 5/18/2018    INR goal:   2.0-3.0   TTR:   74.0 % (2.8 y)   Today's INR:   3.2!   Warfarin maintenance plan:   2 mg (1 mg x 2) on Wed; 3 mg (1 mg x 3) all other days   Weekly warfarin total:   20 mg   Plan last modified:   Markell Stanford, PharmD (1/9/2017)   Next INR check:   6/15/2018   Target end date:   Indefinite    Indications    CVA (cerebral vascular accident) (HCC) [I63.9]  S/P mitral valve repair [Z98.890]  Transient cerebral ischemia (Resolved) [G45.9]             Anticoagulation Episode Summary     INR check location:   Home Draw    Preferred lab:       Send INR reminders to:       Comments:         Anticoagulation Care Providers     Provider Role Specialty Phone number    Dudley Warner M.D. Referring Cardiology [phone]    Ramiro YepezD Responsible          Anticoagulation Patient Findings  Patient Findings     Negatives:   Signs/symptoms of thrombosis, Signs/symptoms of bleeding, Laboratory test error suspected, Change in health, Change in alcohol use, Change in activity, Upcoming invasive procedure, Emergency department visit, Upcoming dental procedure, Missed doses, Extra doses, Change in medications, Change in diet/appetite, Hospital admission, Bruising, Other complaints        HPI:   Roque Nguyen seen in clinic today, on anticoagulation therapy with warfarin for stroke prevention due to history of CVA and TIA.    Patient's previous INR was therapeutic at 2.4 on 4-6-18, at which time patient was instructed to continue with current warfarin regimen.  He returns to clinic today to recheck INR to ensure it is therapeutic and thus preventing possible clotting and/or bleeding/bruising complications.    CHADS-VASc = n/a  (unadjusted ischemic stroke risk/year:  n/a)    Does patient have any changes to current medical/health status since last appt (Y/N):  NO  Does patient have any signs/symptoms of bleeding and/or thrombosis since the last appt (Y/N):   "NO  Does patient have any interval changes to diet or medications since last appt (Y/N):  NO  Are there any complications or cost restrictions with current therapy (Y/N):  NO      Vitals:  /79  HR 73    Weight  declines   Height   5' 6\"     Asssessment:      INR supratherapeutic at 3.2, therefore increasing patient's risk of bleeding/bruising complications due to warfarin.   Reason(s) for out of range INR today:  No identifiable causes for high INR.      Plan:  Instructed patient to decrease today's dose to 2mg, then resume current warfarin regimen in order to bring INR back to therapeutic range.     Follow up:  Because warfarin is a high risk medication and current CHEST guidelines recommend regular monitoring intervals (few days up to 12 weeks), will have patient return to clinic in 4 weeks to recheck INR.    Markell Stanford, PharmD    "

## 2018-06-06 ENCOUNTER — HOSPITAL ENCOUNTER (OUTPATIENT)
Dept: RADIOLOGY | Facility: MEDICAL CENTER | Age: 63
End: 2018-06-06
Attending: NURSE PRACTITIONER
Payer: COMMERCIAL

## 2018-06-06 DIAGNOSIS — I63.9 CEREBROVASCULAR ACCIDENT (CVA), UNSPECIFIED MECHANISM (HCC): ICD-10-CM

## 2018-06-06 DIAGNOSIS — G40.209 PARTIAL EPILEPSY WITH IMPAIRMENT OF CONSCIOUSNESS (HCC): ICD-10-CM

## 2018-06-06 PROCEDURE — 700117 HCHG RX CONTRAST REV CODE 255: Performed by: NURSE PRACTITIONER

## 2018-06-06 PROCEDURE — 70553 MRI BRAIN STEM W/O & W/DYE: CPT

## 2018-06-06 PROCEDURE — A9579 GAD-BASE MR CONTRAST NOS,1ML: HCPCS | Performed by: NURSE PRACTITIONER

## 2018-06-06 RX ADMIN — GADODIAMIDE 15 ML: 287 INJECTION INTRAVENOUS at 09:59

## 2018-06-15 ENCOUNTER — ANTICOAGULATION VISIT (OUTPATIENT)
Dept: MEDICAL GROUP | Facility: PHYSICIAN GROUP | Age: 63
End: 2018-06-15
Payer: COMMERCIAL

## 2018-06-15 VITALS
DIASTOLIC BLOOD PRESSURE: 78 MMHG | WEIGHT: 203 LBS | SYSTOLIC BLOOD PRESSURE: 123 MMHG | HEART RATE: 78 BPM | RESPIRATION RATE: 20 BRPM | BODY MASS INDEX: 32.77 KG/M2

## 2018-06-15 DIAGNOSIS — Z98.890 S/P MITRAL VALVE REPAIR: ICD-10-CM

## 2018-06-15 LAB — INR PPP: 4.1 (ref 2–3.5)

## 2018-06-15 PROCEDURE — 85610 PROTHROMBIN TIME: CPT | Performed by: FAMILY MEDICINE

## 2018-06-15 PROCEDURE — 99211 OFF/OP EST MAY X REQ PHY/QHP: CPT | Performed by: FAMILY MEDICINE

## 2018-06-15 NOTE — PROGRESS NOTES
Anticoagulation Summary  As of 6/15/2018    INR goal:   2.0-3.0   TTR:   72.0 % (2.8 y)   Today's INR:   4.1!   Warfarin maintenance plan:   2 mg (1 mg x 2) on Mon, Fri; 3 mg (1 mg x 3) all other days   Weekly warfarin total:   19 mg   Plan last modified:   Elvin Frank, PharmD (6/15/2018)   Next INR check:   6/29/2018   Target end date:   Indefinite    Indications    CVA (cerebral vascular accident) (HCC) [I63.9]  S/P mitral valve repair [Z98.890]  Transient cerebral ischemia (Resolved) [G45.9]             Anticoagulation Episode Summary     INR check location:   [facility]    Preferred lab:       Send INR reminders to:       Comments:         Anticoagulation Care Providers     Provider Role Specialty Phone number    Dudley Warner M.D. Referring Cardiology [phone]    Ramiro YepezD Responsible          Anticoagulation Patient Findings      HPI:  Roque Stern Patrick seen in clinic today, on anticoagulation therapy with warfarin for CVA.  Reason for today's visit (per our collaborative practice policy) is because their last INR was 3.2 on 5/18/18. Intervention at the last visit: One time reduced dose.   Changes to current medical/health status since last appt: none  No signs/symptoms of bleeding and/or thrombosis since the last appt.    No interval changes to diet or any interval changes to medications since last appt.   No complications or cost restrictions with current therapy.   BP recorded in vitals.  Confirmed dosing regimen.  Pt states he had more beer than usual, he is aware of the interaction and risks.     A/P   INR  SUPRA-therapeutic.   Possible reason(s) INR is not in range today: Beer consumption last night.   Hold today, begin reduced regimen.     Follow up appointment in 2 weeks to reduce risk of adverse events related to this high risk medication, Warfarin.    Purpose of next visit:  They are at a increased risk of bleeding because INR above goal.    Other info:  Pt educated to  contact our clinic with any changes in medications or s/s of bleeding or thrombosis  CHEST guidelines recommend frequent INR monitoring at regular intervals (a few days up to a max of 12 weeks) to ensure they are on the proper dose of warfarin and not having any complications from therapy. INRs can dramatically change over a short time period due to diet, medications, and medical conditions.     Elvin Frank, PharmD

## 2018-06-29 ENCOUNTER — ANTICOAGULATION VISIT (OUTPATIENT)
Dept: MEDICAL GROUP | Facility: PHYSICIAN GROUP | Age: 63
End: 2018-06-29
Payer: COMMERCIAL

## 2018-06-29 VITALS — DIASTOLIC BLOOD PRESSURE: 74 MMHG | HEART RATE: 70 BPM | SYSTOLIC BLOOD PRESSURE: 107 MMHG

## 2018-06-29 DIAGNOSIS — Z98.890 S/P MITRAL VALVE REPAIR: ICD-10-CM

## 2018-06-29 LAB — INR PPP: 2.8 (ref 2–3.5)

## 2018-06-29 PROCEDURE — 85610 PROTHROMBIN TIME: CPT | Performed by: FAMILY MEDICINE

## 2018-06-29 PROCEDURE — 99999 PR NO CHARGE: CPT | Performed by: FAMILY MEDICINE

## 2018-06-29 NOTE — PROGRESS NOTES
Anticoagulation Summary  As of 6/29/2018    INR goal:   2.0-3.0   TTR:   71.2 % (2.9 y)   Today's INR:   2.8   Warfarin maintenance plan:   2 mg (1 mg x 2) on Mon, Fri; 3 mg (1 mg x 3) all other days   Weekly warfarin total:   19 mg   Plan last modified:   Elvin Frank, PharmD (6/15/2018)   Next INR check:   7/13/2018   Target end date:   Indefinite    Indications    CVA (cerebral vascular accident) (HCC) [I63.9]  S/P mitral valve repair [Z98.890]  Transient cerebral ischemia (Resolved) [G45.9]             Anticoagulation Episode Summary     INR check location:   [facility]    Preferred lab:       Send INR reminders to:       Comments:         Anticoagulation Care Providers     Provider Role Specialty Phone number    Dudley Warner M.D. Referring Cardiology [phone]    Ramiro YepezD Responsible          Anticoagulation Patient Findings  Patient Findings     Negatives:   Signs/symptoms of thrombosis, Signs/symptoms of bleeding, Laboratory test error suspected, Change in health, Change in alcohol use, Change in activity, Upcoming invasive procedure, Emergency department visit, Upcoming dental procedure, Missed doses, Extra doses, Change in medications, Change in diet/appetite, Hospital admission, Bruising, Other complaints        HPI:   Roque Nguyen seen in clinic today, on anticoagulation therapy with warfarin for stroke prevention due to history of CVA/TIA and mitral valve repair.    Patient's previous INR was supratherapeutic at 4.1 on 6-15-18, at which time patient was instructed to hold one dose, then decrease weekly warfarin regimen.  He returns to clinic today to recheck INR to ensure it is therapeutic and thus preventing possible clotting and/or bleeding/bruising complications.    CHADS-VASc = n/a  (unadjusted ischemic stroke risk/year:  n/a)    Does patient have any changes to current medical/health status since last appt (Y/N):  No  Does patient have any signs/symptoms of bleeding  "and/or thrombosis since the last appt (Y/N):  NO  Does patient have any interval changes to diet or medications since last appt (Y/N):  NO  Are there any complications or cost restrictions with current therapy (Y/N):  NO      Vitals:  /74  HR 70    Weight  declines   Height   5' 6\"     Asssessment:      INR therapeutic at 2.8, therefore decreasing patient's risk of stroke and/or bleeding complications.   Reason(s) for out of range INR today:  n/a      Plan:  Pt is to continue with current warfarin dosing regimen in order to maintain INR in therapeutic range.     Follow up:  Because warfarin is a high risk medication and current CHEST guidelines recommend regular monitoring intervals (few days up to 12 weeks), will have patient return to clinic in 2 weeks to recheck INR.    Markell Stanford, PharmD    "

## 2018-07-13 ENCOUNTER — ANTICOAGULATION VISIT (OUTPATIENT)
Dept: MEDICAL GROUP | Facility: PHYSICIAN GROUP | Age: 63
End: 2018-07-13
Payer: COMMERCIAL

## 2018-07-13 VITALS — HEART RATE: 76 BPM | SYSTOLIC BLOOD PRESSURE: 114 MMHG | DIASTOLIC BLOOD PRESSURE: 78 MMHG

## 2018-07-13 DIAGNOSIS — Z98.890 S/P MITRAL VALVE REPAIR: ICD-10-CM

## 2018-07-13 LAB — INR PPP: 4.4 (ref 2–3.5)

## 2018-07-13 PROCEDURE — 99211 OFF/OP EST MAY X REQ PHY/QHP: CPT | Performed by: FAMILY MEDICINE

## 2018-07-13 PROCEDURE — 85610 PROTHROMBIN TIME: CPT | Performed by: FAMILY MEDICINE

## 2018-07-13 NOTE — PROGRESS NOTES
Anticoagulation Summary  As of 7/13/2018    INR goal:   2.0-3.0   TTR:   70.4 % (2.9 y)   Today's INR:   4.4!   Warfarin maintenance plan:   2 mg (1 mg x 2) on Mon, Wed, Fri; 3 mg (1 mg x 3) all other days   Weekly warfarin total:   18 mg   Plan last modified:   Markell Stanford, PharmD (7/13/2018)   Next INR check:   7/27/2018   Target end date:   Indefinite    Indications    CVA (cerebral vascular accident) (HCC) [I63.9]  S/P mitral valve repair [Z98.890]  Transient cerebral ischemia (Resolved) [G45.9]             Anticoagulation Episode Summary     INR check location:   [facility]    Preferred lab:       Send INR reminders to:       Comments:         Anticoagulation Care Providers     Provider Role Specialty Phone number    Dudley Warner M.D. Referring Cardiology [phone]    Ramiro YepezD Responsible          Anticoagulation Patient Findings  Patient Findings     Negatives:   Signs/symptoms of thrombosis, Signs/symptoms of bleeding, Laboratory test error suspected, Change in health, Change in alcohol use, Change in activity, Upcoming invasive procedure, Emergency department visit, Upcoming dental procedure, Missed doses, Extra doses, Change in medications, Change in diet/appetite, Hospital admission, Bruising, Other complaints        HPI:   Roque Nguyen seen in clinic today, on anticoagulation therapy with warfarin for stroke prevention due to history of CVA, TIA, and mitral valve repair.    Patient's previous INR was therapeutic at 2.8 on 6-29-18, at which time patient was instructed to continue with current warfarin regimen.  He returns to clinic today to recheck INR to ensure it is therapeutic and thus preventing possible clotting and/or bleeding/bruising complications.    CHADS-VASc = n/a  (unadjusted ischemic stroke risk/year:  n/a)    Does patient have any changes to current medical/health status since last appt (Y/N):  NO  Does patient have any signs/symptoms of bleeding and/or  "thrombosis since the last appt (Y/N):  NO  Does patient have any interval changes to diet or medications since last appt (Y/N):  NO  Are there any complications or cost restrictions with current therapy (Y/N):  NO      Vitals:  /78  HR 76    Weight  declines   Height   5' 6\"     Asssessment:      INR supratherapeutic at 4.4, therefore increasing patient's risk of bleeding complications due to warfarin.   Reason(s) for out of range INR today:  No identifiable causes for high INR.      Plan:  Instructed patient to HOLD X 1, then decrease weekly warfarin regimen as detailed above in order to bring INR to therapeutic range.     Follow up:  Because warfarin is a high risk medication and current CHEST guidelines recommend regular monitoring intervals (few days up to 12 weeks), will have patient return to clinic in 2 weeks to recheck INR.    Markell Stanford, PharmD    "

## 2018-07-27 ENCOUNTER — ANTICOAGULATION VISIT (OUTPATIENT)
Dept: MEDICAL GROUP | Facility: PHYSICIAN GROUP | Age: 63
End: 2018-07-27
Payer: COMMERCIAL

## 2018-07-27 VITALS — SYSTOLIC BLOOD PRESSURE: 86 MMHG | HEART RATE: 107 BPM | DIASTOLIC BLOOD PRESSURE: 68 MMHG

## 2018-07-27 DIAGNOSIS — Z98.890 S/P MITRAL VALVE REPAIR: ICD-10-CM

## 2018-07-27 LAB — INR PPP: 3.2 (ref 2–3.5)

## 2018-07-27 PROCEDURE — 85610 PROTHROMBIN TIME: CPT | Performed by: INTERNAL MEDICINE

## 2018-07-27 PROCEDURE — 99211 OFF/OP EST MAY X REQ PHY/QHP: CPT | Performed by: INTERNAL MEDICINE

## 2018-07-27 NOTE — PROGRESS NOTES
Anticoagulation Summary  As of 7/27/2018    INR goal:   2.0-3.0   TTR:   69.5 % (2.9 y)   Today's INR:   3.2!   Warfarin maintenance plan:   3 mg (1 mg x 3) on Sun, Tue, Thu; 2 mg (1 mg x 2) all other days   Weekly warfarin total:   17 mg   Plan last modified:   Ramiro ForteD (7/27/2018)   Next INR check:   8/10/2018   Target end date:   Indefinite    Indications    CVA (cerebral vascular accident) (HCC) [I63.9]  S/P mitral valve repair [Z98.890]  Transient cerebral ischemia (Resolved) [G45.9]             Anticoagulation Episode Summary     INR check location:   [facility]    Preferred lab:       Send INR reminders to:       Comments:   [place]      Anticoagulation Care Providers     Provider Role Specialty Phone number    Dudley Warner M.D. Referring Cardiology [phone]    Ramiro YepezD Responsible          Anticoagulation Patient Findings  Patient Findings     Negatives:   Signs/symptoms of thrombosis, Signs/symptoms of bleeding, Laboratory test error suspected, Change in health, Change in alcohol use, Change in activity, Upcoming invasive procedure, Emergency department visit, Upcoming dental procedure, Missed doses, Extra doses, Change in medications, Change in diet/appetite, Hospital admission, Bruising, Other complaints        HPI:   Roque Nguyen seen in clinic today, on anticoagulation therapy with warfarin for stroke prevention due to history of CVA, mitral valve repair.    Patient's previous INR was supratherapeutic at 4.4 on 7-13-18, at which time patient was instructed to hold one dose, then decrease weekly warfarin regimen.  He returns to clinic today to recheck INR to ensure it is therapeutic and thus preventing possible clotting and/or bleeding/bruising complications.    CHADS-VASc = n/a  (unadjusted ischemic stroke risk/year:  n/a)    Does patient have any changes to current medical/health status since last appt (Y/N):  NO  Does patient have any signs/symptoms of  "bleeding and/or thrombosis since the last appt (Y/N):  NO  Does patient have any interval changes to diet or medications since last appt (Y/N):  NO  Are there any complications or cost restrictions with current therapy (Y/N):  NO      Vitals:  BP 86/68   no s/s of hypotension, patient normally runs low    Weight  declines   Height   5' 6\"     Asssessment:      INR remains slightly supratherapeutic at 3.2, therefore increasing patient's risk of bleeding complications due to warfarin.   Reason(s) for out of range INR today:  No identifiable causes, states he is exercising a bit more recently though.      Plan:  Instructed patient to decrease weekly warfarin regimen by ~5% as detailed above in order to bring INR back to therapeutic range.     Follow up:  Because warfarin is a high risk medication and current CHEST guidelines recommend regular monitoring intervals (few days up to 12 weeks), will have patient return to clinic in 2 weeks to recheck INR.    Markell Stanford, PharmD    "

## 2018-08-10 ENCOUNTER — ANTICOAGULATION VISIT (OUTPATIENT)
Dept: MEDICAL GROUP | Facility: PHYSICIAN GROUP | Age: 63
End: 2018-08-10
Payer: COMMERCIAL

## 2018-08-10 VITALS — SYSTOLIC BLOOD PRESSURE: 103 MMHG | HEART RATE: 77 BPM | DIASTOLIC BLOOD PRESSURE: 73 MMHG

## 2018-08-10 DIAGNOSIS — Z98.890 S/P MITRAL VALVE REPAIR: ICD-10-CM

## 2018-08-10 LAB — INR PPP: 2 (ref 2–3.5)

## 2018-08-10 PROCEDURE — 99999 PR NO CHARGE: CPT | Performed by: FAMILY MEDICINE

## 2018-08-10 PROCEDURE — 85610 PROTHROMBIN TIME: CPT | Performed by: FAMILY MEDICINE

## 2018-08-10 NOTE — PROGRESS NOTES
Anticoagulation Summary  As of 8/10/2018    INR goal:   2.0-3.0   TTR:   69.7 % (3 y)   Today's INR:   2.0   Warfarin maintenance plan:   3 mg (1 mg x 3) on Sun, Tue, Thu; 2 mg (1 mg x 2) all other days   Weekly warfarin total:   17 mg   Plan last modified:   Ramiro ForteD (7/27/2018)   Next INR check:   8/24/2018   Target end date:   Indefinite    Indications    CVA (cerebral vascular accident) (HCC) [I63.9]  S/P mitral valve repair [Z98.890]  Transient cerebral ischemia (Resolved) [G45.9]             Anticoagulation Episode Summary     INR check location:   [facility]    Preferred lab:       Send INR reminders to:       Comments:   [place]      Anticoagulation Care Providers     Provider Role Specialty Phone number    Dudley Warner M.D. Referring Cardiology [phone]    Ramiro YepezD Responsible          Anticoagulation Patient Findings  Patient Findings     Negatives:   Signs/symptoms of thrombosis, Signs/symptoms of bleeding, Laboratory test error suspected, Change in health, Change in alcohol use, Change in activity, Upcoming invasive procedure, Emergency department visit, Upcoming dental procedure, Missed doses, Extra doses, Change in medications, Change in diet/appetite, Hospital admission, Bruising, Other complaints        HPI:   Roque Nguyen seen in clinic today, on anticoagulation therapy with warfarin for stroke prevention due to history of mitral valve repair and CVA/TIA.    Patient's previous INR was supratherapeutic at 3.2 on 7-27-18, at which time patient was instructed to decrease weekly warfarin regimen.  He returns to clinic today to recheck INR to ensure it is therapeutic and thus preventing possible clotting and/or bleeding/bruising complications.    CHADS-VASc = n/a  (unadjusted ischemic stroke risk/year:  n/a)    Does patient have any changes to current medical/health status since last appt (Y/N):  NO  Does patient have any signs/symptoms of bleeding and/or  "thrombosis since the last appt (Y/N):  NO  Does patient have any interval changes to diet or medications since last appt (Y/N):  NO  Are there any complications or cost restrictions with current therapy (Y/N):  NO      Vitals:  /73  HR 77    Weight  declines   Height   5' 6\"     Asssessment:      INR therapeutic at 2.0, therefore decreasing patient's risk of stroke and/or bleeding complications.   Reason(s) for out of range INR today:  n/a      Plan:  Pt is to continue with current warfarin dosing regimen in order to maintain INR in therapeutic range.     Follow up:  Because warfarin is a high risk medication and current CHEST guidelines recommend regular monitoring intervals (few days up to 12 weeks), will have patient return to clinic in 2 weeks to recheck INR.    Markell Stanford, PharmD    "

## 2018-08-24 ENCOUNTER — ANTICOAGULATION VISIT (OUTPATIENT)
Dept: MEDICAL GROUP | Facility: PHYSICIAN GROUP | Age: 63
End: 2018-08-24
Payer: COMMERCIAL

## 2018-08-24 VITALS — SYSTOLIC BLOOD PRESSURE: 96 MMHG | HEART RATE: 87 BPM | DIASTOLIC BLOOD PRESSURE: 70 MMHG

## 2018-08-24 DIAGNOSIS — Z98.890 S/P MITRAL VALVE REPAIR: ICD-10-CM

## 2018-08-24 LAB — INR PPP: 2.2 (ref 2–3.5)

## 2018-08-24 PROCEDURE — 99999 PR NO CHARGE: CPT | Performed by: FAMILY MEDICINE

## 2018-08-24 PROCEDURE — 85610 PROTHROMBIN TIME: CPT | Performed by: FAMILY MEDICINE

## 2018-08-24 NOTE — PROGRESS NOTES
Anticoagulation Summary  As of 8/24/2018    INR goal:   2.0-3.0   TTR:   70.1 % (3 y)   Today's INR:   2.2   Warfarin maintenance plan:   3 mg (1 mg x 3) on Sun, Tue, Thu; 2 mg (1 mg x 2) all other days   Weekly warfarin total:   17 mg   Plan last modified:   Ramiro ForteD (7/27/2018)   Next INR check:   9/14/2018   Target end date:   Indefinite    Indications    CVA (cerebral vascular accident) (HCC) [I63.9]  S/P mitral valve repair [Z98.890]  Transient cerebral ischemia (Resolved) [G45.9]             Anticoagulation Episode Summary     [facility]R check location:   [facility]    Preferred lab:       Send INR reminders to:       Comments:   [place]      Anticoagulation Care Providers     Provider Role Specialty Phone number    Dudley Warner M.D. Referring Cardiology [phone]    Ramiro YepezD Responsible          Anticoagulation Patient Findings  Patient Findings     Negatives:   Signs/symptoms of thrombosis, Signs/symptoms of bleeding, Laboratory test error suspected, Change in health, Change in alcohol use, Change in activity, Upcoming invasive procedure, Emergency department visit, Upcoming dental procedure, Missed doses, Extra doses, Change in medications, Change in diet/appetite, Hospital admission, Bruising, Other complaints        HPI:   Roque Nguyen seen in clinic today, on anticoagulation therapy with warfarin for stroke prevention due to history of mitral valve repair and CVA/TIA.    Patient's previous INR was therapeutic at 2.0 on 8-10-18, at which time patient was instructed to continue with current warfarin regimen.  He returns to clinic today to recheck INR to ensure it is therapeutic and thus preventing possible clotting and/or bleeding/bruising complications.    CHADS-VASc = n/a  (unadjusted ischemic stroke risk/year:  n/a)    Does patient have any changes to current medical/health status since last appt (Y/N):  NO  Does patient have any signs/symptoms of bleeding  "and/or thrombosis since the last appt (Y/N):  No  Does patient have any interval changes to diet or medications since last appt (Y/N):  NO  Are there any complications or cost restrictions with current therapy (Y/N):  NO      Vitals:  BP 96/70  HR 87    Weight  declines   Height   5' 6\"     Asssessment:      INR remains therapeutic at 2.2, therefore decreasing patient's risk of stroke and/or bleeding complications.   Reason(s) for out of range INR today:  n/a      Plan:  Pt is to continue with current warfarin dosing regimen in order to maintain INR in therapeutic range.     Follow up:  Because warfarin is a high risk medication and current CHEST guidelines recommend regular monitoring intervals (few days up to 12 weeks), will have patient return to clinic in 3 weeks to recheck INR.    Markell Stanford, PharmD    "

## 2018-08-30 ENCOUNTER — TELEPHONE (OUTPATIENT)
Dept: NEUROLOGY | Facility: MEDICAL CENTER | Age: 63
End: 2018-08-30

## 2018-08-30 DIAGNOSIS — G40.009 PARTIAL IDIOPATHIC EPILEPSY WITH SEIZURES OF LOCALIZED ONSET, NOT INTRACTABLE, WITHOUT STATUS EPILEPTICUS (HCC): ICD-10-CM

## 2018-08-30 RX ORDER — LACOSAMIDE 200 MG/1
200 TABLET ORAL 2 TIMES DAILY
Qty: 180 TAB | Refills: 1 | Status: SHIPPED
Start: 2018-08-30 | End: 2019-03-04 | Stop reason: SDUPTHER

## 2018-08-30 NOTE — TELEPHONE ENCOUNTER
lacosamide (VIMPAT) 200 MG Tab tablet 180 Tab 1/1 2/26/2018 8/26/2018    Sig - Route: Take 200 mg by mouth 2 Times a Day for 181 days. - Oral      Was the patient seen in the last year in this department? Yes 4/16/18    Does patient have an active prescription for medications requested? Yes    Received Request Via: Pharmacy    Next scheduled follow up appointment: 10/17/18

## 2018-09-14 ENCOUNTER — ANTICOAGULATION VISIT (OUTPATIENT)
Dept: MEDICAL GROUP | Facility: PHYSICIAN GROUP | Age: 63
End: 2018-09-14
Payer: COMMERCIAL

## 2018-09-14 VITALS — DIASTOLIC BLOOD PRESSURE: 80 MMHG | HEART RATE: 81 BPM | SYSTOLIC BLOOD PRESSURE: 102 MMHG

## 2018-09-14 DIAGNOSIS — Z98.890 S/P MITRAL VALVE REPAIR: ICD-10-CM

## 2018-09-14 DIAGNOSIS — I63.9 CEREBROVASCULAR ACCIDENT (CVA), UNSPECIFIED MECHANISM (HCC): ICD-10-CM

## 2018-09-14 LAB — INR PPP: 2.2 (ref 2–3.5)

## 2018-09-14 PROCEDURE — 85610 PROTHROMBIN TIME: CPT | Performed by: FAMILY MEDICINE

## 2018-09-14 PROCEDURE — 99999 PR NO CHARGE: CPT | Performed by: FAMILY MEDICINE

## 2018-09-14 NOTE — PROGRESS NOTES
Anticoagulation Summary  As of 9/14/2018    INR goal:   2.0-3.0   TTR:   70.6 % (3.1 y)   Today's INR:   2.2   Warfarin maintenance plan:   3 mg (1 mg x 3) on Sun, Tue, Thu; 2 mg (1 mg x 2) all other days   Weekly warfarin total:   17 mg   Plan last modified:   Ramiro ForteD (7/27/2018)   Next INR check:   10/12/2018   Target end date:   Indefinite    Indications    CVA (cerebral vascular accident) (HCC) [I63.9]  S/P mitral valve repair [Z98.890]  Transient cerebral ischemia (Resolved) [G45.9]             Anticoagulation Episode Summary     [facility]R check location:   [facility]    Preferred lab:       Send INR reminders to:       Comments:   [place]      Anticoagulation Care Providers     Provider Role Specialty Phone number    Dudley Warner M.D. Referring Cardiology [phone]    Ramiro YepezD Responsible          Anticoagulation Patient Findings  Patient Findings     Negatives:   Signs/symptoms of thrombosis, Signs/symptoms of bleeding, Laboratory test error suspected, Change in health, Change in alcohol use, Change in activity, Upcoming invasive procedure, Emergency department visit, Upcoming dental procedure, Missed doses, Extra doses, Change in medications, Change in diet/appetite, Hospital admission, Bruising, Other complaints        HPI:   Roque Nguyen seen in clinic today, on anticoagulation therapy with warfarin for stroke prevention due to history of CVA/TIA and mitral valve repair.    Patient's previous INR was therapeutic at 2.2 on 8-24-18, at which time patient was instructed to continue with current warfarin regimen.  He returns to clinic today to recheck INR to ensure it is therapeutic and thus preventing possible clotting and/or bleeding/bruising complications.    CHADS-VASc = n/a  (unadjusted ischemic stroke risk/year:  n/a)    Does patient have any changes to current medical/health status since last appt (Y/N):  NO  Does patient have any signs/symptoms of bleeding  "and/or thrombosis since the last appt (Y/N):  NO  Does patient have any interval changes to diet or medications since last appt (Y/N):  NO  Are there any complications or cost restrictions with current therapy (Y/N):  No       Vitals:  /80  HR 81    Weight  declines   Height   5' 6\"     Asssessment:      INR remains therapeutic at 2.2, therefore decreasing patient's risk of stroke and/or bleeding complications.   Reason(s) for out of range INR today:  n/a      Plan:  Pt is to continue with current warfarin dosing regimen in order to maintain INR in therapeutic range.     Follow up:  Because warfarin is a high risk medication and current CHEST guidelines recommend regular monitoring intervals (few days up to 12 weeks), will have patient return to clinic in 5 weeks to recheck INR.    Markell Stanford, PharmD    "

## 2018-10-12 ENCOUNTER — ANTICOAGULATION VISIT (OUTPATIENT)
Dept: MEDICAL GROUP | Facility: PHYSICIAN GROUP | Age: 63
End: 2018-10-12
Payer: COMMERCIAL

## 2018-10-12 VITALS — SYSTOLIC BLOOD PRESSURE: 104 MMHG | HEART RATE: 73 BPM | DIASTOLIC BLOOD PRESSURE: 79 MMHG

## 2018-10-12 DIAGNOSIS — I63.9 CEREBROVASCULAR ACCIDENT (CVA), UNSPECIFIED MECHANISM (HCC): ICD-10-CM

## 2018-10-12 DIAGNOSIS — Z98.890 S/P MITRAL VALVE REPAIR: ICD-10-CM

## 2018-10-12 LAB — INR PPP: 2.2 (ref 2–3.5)

## 2018-10-12 PROCEDURE — 99999 PR NO CHARGE: CPT | Performed by: FAMILY MEDICINE

## 2018-10-12 PROCEDURE — 85610 PROTHROMBIN TIME: CPT | Performed by: FAMILY MEDICINE

## 2018-10-12 NOTE — PROGRESS NOTES
Anticoagulation Summary  As of 10/12/2018    INR goal:   2.0-3.0   TTR:   71.3 % (3.2 y)   Today's INR:   2.2   Warfarin maintenance plan:   3 mg (1 mg x 3) on Sun, Tue, Thu; 2 mg (1 mg x 2) all other days   Weekly warfarin total:   17 mg   Plan last modified:   Ramiro ForteD (7/27/2018)   Next INR check:   11/16/2018   Target end date:   Indefinite    Indications    CVA (cerebral vascular accident) (HCC) [I63.9]  S/P mitral valve repair [Z98.890]  Transient cerebral ischemia (Resolved) [G45.9]             Anticoagulation Episode Summary     [facility]R check location:   [facility]    Preferred lab:       Send INR reminders to:       Comments:   [place]      Anticoagulation Care Providers     Provider Role Specialty Phone number    Dudley Warner M.D. Referring Cardiology [phone]    Ramiro YepezD Responsible          Anticoagulation Patient Findings  Patient Findings     Negatives:   Signs/symptoms of thrombosis, Signs/symptoms of bleeding, Laboratory test error suspected, Change in health, Change in alcohol use, Change in activity, Upcoming invasive procedure, Emergency department visit, Upcoming dental procedure, Missed doses, Extra doses, Change in medications, Change in diet/appetite, Hospital admission, Bruising, Other complaints        HPI:   Roque Nguyen seen in clinic today, on anticoagulation therapy with warfarin for stroke prevention due to history of mitral valve repair, CVA/TIA.    Patient's previous INR was therapeutic at 2.2 on 9-14-18, at which time patient was instructed to continue with current warfarin regimen.  He returns to clinic today to recheck INR to ensure it is therapeutic and thus preventing possible clotting and/or bleeding/bruising complications.    CHADS-VASc = n/a  (unadjusted ischemic stroke risk/year:  n/a)    Does patient have any changes to current medical/health status since last appt (Y/N):  NO  Does patient have any signs/symptoms of bleeding  "and/or thrombosis since the last appt (Y/N):  NO  Does patient have any interval changes to diet or medications since last appt (Y/N):  NO  Are there any complications or cost restrictions with current therapy (Y/N):  NO      Vitals:  /79  HR 73    Weight  declines   Height   5' 6\"     Asssessment:      INR remains therapeutic at 2.2, therefore decreasing patient's risk of stroke and/or bleeding complications.   Reason(s) for out of range INR today:  n/a      Plan:  Pt is to continue with current warfarin dosing regimen in order to maintain INR in therapeutic range.     Follow up:  Because warfarin is a high risk medication and current CHEST guidelines recommend regular monitoring intervals (few days up to 12 weeks), will have patient return to clinic in 5 weeks to recheck INR.    Markell Stanford, PharmD    "

## 2018-10-17 ENCOUNTER — OFFICE VISIT (OUTPATIENT)
Dept: NEUROLOGY | Facility: MEDICAL CENTER | Age: 63
End: 2018-10-17
Payer: COMMERCIAL

## 2018-10-17 VITALS
TEMPERATURE: 97.2 F | HEART RATE: 97 BPM | OXYGEN SATURATION: 90 % | WEIGHT: 199.96 LBS | SYSTOLIC BLOOD PRESSURE: 116 MMHG | RESPIRATION RATE: 18 BRPM | HEIGHT: 66 IN | BODY MASS INDEX: 32.14 KG/M2 | DIASTOLIC BLOOD PRESSURE: 64 MMHG

## 2018-10-17 DIAGNOSIS — I63.9 CEREBROVASCULAR ACCIDENT (CVA), UNSPECIFIED MECHANISM (HCC): ICD-10-CM

## 2018-10-17 DIAGNOSIS — R41.89 IMPAIRED COGNITION: ICD-10-CM

## 2018-10-17 DIAGNOSIS — G40.009 PARTIAL IDIOPATHIC EPILEPSY WITH SEIZURES OF LOCALIZED ONSET, NOT INTRACTABLE, WITHOUT STATUS EPILEPTICUS (HCC): ICD-10-CM

## 2018-10-17 PROCEDURE — 99214 OFFICE O/P EST MOD 30 MIN: CPT | Performed by: NURSE PRACTITIONER

## 2018-10-17 RX ORDER — LEVETIRACETAM 1000 MG/1
2000 TABLET ORAL 2 TIMES DAILY
Qty: 120 TAB | Refills: 11 | Status: SHIPPED | OUTPATIENT
Start: 2018-10-17

## 2018-10-17 ASSESSMENT — ENCOUNTER SYMPTOMS
ABDOMINAL PAIN: 0
MUSCULOSKELETAL NEGATIVE: 1
DIARRHEA: 0
DOUBLE VISION: 0
CONSTITUTIONAL NEGATIVE: 1
COUGH: 0
MEMORY LOSS: 1
DEPRESSION: 0
NERVOUS/ANXIOUS: 0
VOMITING: 0
HEADACHES: 0
NAUSEA: 0
SORE THROAT: 0

## 2018-10-17 NOTE — PROGRESS NOTES
Subjective:      Roque Nguyen is a 63 y.o. male who presents with Follow-Up (Partial idiopathic epilepsy with seizures of localized onset, not intractable, without status epilepticus )        HPI  No concern for seizures.  Last known seizure was March 2012.     No events of loss of awareness, or significant forgetfulness or expressive aphasia.     He has no real health concerns except he is trying to lose weight.  He has been busy at home, active.  He is trying to watch his diet.     Embolic stroke:  Stable with no concerns.  Doing well with coumadin and INR is routinely checked.  Denies an sensory changes, headaches, or weakness.     1/26/2018  ICTAL AND/OR INTERICTAL FINDINGS:   Frequent left temporal sharps and intermittent left temporal slowing noted. No clinical events or seizures were reported or recorded during the study.      Needs DMV form completed today.    Stressed and emotional as his wife has had a recurrence of her cancer.  They are planning for her life to be shortened due to the cancer.    2018: Impression     1.  Encephalomalacia in the left parietal lobe.  2.  Chronic lacunar infarcts in the cerebellum and basal ganglia.  3.  Mild cerebral atrophy.  4.  Mild chronic microvascular ischemic disease.  5.  There is no evidence of hippocampal sclerosis, cortical dysplasia or neuronal migrational abnormalities.     2015: Impression       1.  Multifocal infarcts in the supratentorial brain parenchyma and cerebellar hemispheres. Large area of infarct is noted in the left posterior temporal and occipital lobe.  2.  Chronic lacunar infarcts in the bilateral caudate nucleus and bilateral cerebellar hemispheres.  3.  Moderate cerebral atrophy.         Current Outpatient Prescriptions   Medication Sig Dispense Refill   • lacosamide (VIMPAT) 200 MG Tab tablet Take 200 mg by mouth 2 Times a Day for 181 days. 180 Tab 1   • warfarin (COUMADIN) 1 MG Tab TAKE TWO TO THREE TABLETS BY MOUTH DAILY AS DIRECTED BY  "[facility] 270 Tab 1   • lisinopril (PRINIVIL) 10 MG Tab Take 1 Tab by mouth every day. 90 Tab 3   • carvedilol (COREG) 12.5 MG Tab Take 1 Tab by mouth 2 times a day, with meals. 180 Tab 3   • levetiracetam (KEPPRA) 1000 MG tablet Take 2 Tabs by mouth 2 Times a Day. 120 Tab 11   • multivitamin (THERAGRAN) Tab Take 1 Tab by mouth every day.     • calcium citrate (CALCITRATE) 950 MG Tab Take 950 mg by mouth every day.       No current facility-administered medications for this visit.          Review of Systems   Constitutional: Negative.    HENT: Negative for hearing loss, nosebleeds and sore throat.         No recent head injury.   Eyes: Negative for double vision.        No new loss of vision.   Respiratory: Negative for cough.         No recent lung infections.   Cardiovascular: Negative for chest pain.   Gastrointestinal: Negative for abdominal pain, diarrhea, nausea and vomiting.   Genitourinary: Negative.    Musculoskeletal: Negative.    Skin: Negative.    Neurological: Negative for headaches.   Endo/Heme/Allergies:        No history of endocrine dysfunction.  No new problems.   Psychiatric/Behavioral: Positive for memory loss. Negative for depression. The patient is not nervous/anxious.         No recent mood changes.          Objective:     Resp 18   Ht 1.676 m (5' 6\")   Wt 90.7 kg (199 lb 15.3 oz)   BMI 32.27 kg/m²      Physical Exam   Constitutional: He is oriented to person, place, and time. He appears well-developed and well-nourished. No distress.   HENT:   Head: Normocephalic and atraumatic.   Mouth/Throat: Oropharynx is clear and moist.   No new hearing loss.   Eyes: EOM are normal.   No double vision or loss of vision.   Neck: Normal range of motion.   Cardiovascular: Normal rate, regular rhythm and normal heart sounds.    No recent chest pain.   Pulmonary/Chest: Effort normal.   Abdominal: There is no tenderness.   Genitourinary:   Genitourinary Comments: No recent infections. "   Musculoskeletal: Normal range of motion.   Lymphadenopathy:     He has no cervical adenopathy.   Neurological: He is alert and oriented to person, place, and time. He has normal strength and normal reflexes. He displays tremor. No cranial nerve deficit. He displays no seizure activity. Gait normal.   No observable changes in neurologic status.  See initial new patient examination for details.     Skin: Skin is warm and dry.   Psychiatric: He has a normal mood and affect. His speech is normal. His mood appears not anxious. He does not exhibit a depressed mood.   Occasional tremor in his voice                Assessment/Plan:     Localization-related epilepsy with subsequent embolic stroke secondary to surgery for heart valve replacement:  Last known seizure was March 2012.  Last EEG on record was in 2011.     He is a primary  for the family as his 2 sons have autism and his wife being treated for cancer.     Lengthy conversation with Roque brown about driving.  He is to drive on extremely limited basis, to limit highly/freeway driving.  He needs to be awake at least 1 hour prior to driving each time.     Continue Keppra 2000 mg twice a day and Vimpat 200 mg twice a day.       Continues coumadin management.     Obtain labs as ordered with the next lab draw.      Return for follow-up care in 6 months.  Will need a DMV form in April.  I spent 35 minutes with this patient, over fifty percent was spent counseling patient on their condition, best management practices, reviewing test results and risks and benefits of treatment.

## 2018-10-18 PROBLEM — G40.909 EPILEPSY (HCC): Status: RESOLVED | Noted: 2018-05-14 | Resolved: 2018-10-18

## 2018-11-16 ENCOUNTER — ANTICOAGULATION VISIT (OUTPATIENT)
Dept: MEDICAL GROUP | Facility: PHYSICIAN GROUP | Age: 63
End: 2018-11-16
Payer: COMMERCIAL

## 2018-11-16 VITALS — SYSTOLIC BLOOD PRESSURE: 102 MMHG | DIASTOLIC BLOOD PRESSURE: 75 MMHG | HEART RATE: 69 BPM

## 2018-11-16 DIAGNOSIS — I63.9 CEREBROVASCULAR ACCIDENT (CVA), UNSPECIFIED MECHANISM (HCC): ICD-10-CM

## 2018-11-16 DIAGNOSIS — Z98.890 S/P MITRAL VALVE REPAIR: ICD-10-CM

## 2018-11-16 LAB — INR PPP: 2 (ref 2–3.5)

## 2018-11-16 PROCEDURE — 99999 PR NO CHARGE: CPT | Performed by: FAMILY MEDICINE

## 2018-11-16 PROCEDURE — 85610 PROTHROMBIN TIME: CPT | Performed by: FAMILY MEDICINE

## 2018-11-16 NOTE — PROGRESS NOTES
Anticoagulation Summary  As of 11/16/2018    INR goal:   2.0-3.0   TTR:   72.2 % (3.3 y)   Today's INR:   2.0   Warfarin maintenance plan:   3 mg (1 mg x 3) on Sun, Tue, Thu; 2 mg (1 mg x 2) all other days   Weekly warfarin total:   17 mg   Plan last modified:   Ramiro ForteD (7/27/2018)   Next INR check:   12/28/2018   Target end date:   Indefinite    Indications    CVA (cerebral vascular accident) (HCC) [I63.9]  S/P mitral valve repair [Z98.890]  Transient cerebral ischemia (Resolved) [G45.9]             Anticoagulation Episode Summary     [facility]   [facility]    Preferred lab:       Send INR reminders to:       Comments:   [place]      Anticoagulation Care Providers     Provider Role Specialty Phone number    Dudley Warner M.D. Referring Cardiology [phone]    Ramiro YepezD Responsible          Anticoagulation Patient Findings  Patient Findings     Negatives:   Signs/symptoms of thrombosis, Signs/symptoms of bleeding, Laboratory test error suspected, Change in health, Change in alcohol use, Change in activity, Upcoming invasive procedure, Emergency department visit, Upcoming dental procedure, Missed doses, Extra doses, Change in medications, Change in diet/appetite, Hospital admission, Bruising, Other complaints        HPI:   Roque Nguyen seen in clinic today, on anticoagulation therapy with warfarin for stroke prevention due to history of CVA and mitral valve repair.    Patient's previous INR was therapeutic at 2.2 on 10-12-18, at which time patient was instructed to continue with current warfarin regimen.  She returns to clinic today to recheck INR to ensure it is therapeutic and thus preventing possible clotting and/or bleeding/bruising complications.    CHADS-VASc = n/a  (unadjusted ischemic stroke risk/year:  n/a)    Does patient have any changes to current medical/health status since last appt (Y/N):  NO  Does patient have any signs/symptoms of bleeding  "and/or thrombosis since the last appt (Y/N):  NO  Does patient have any interval changes to diet or medications since last appt (Y/N):  NO  Are there any complications or cost restrictions with current therapy (Y/N):  NO      Vitals:  /75  HR 69    Weight  declines   Height   5' 6\"     Asssessment:      INR remains therapeutic at 2.0, therefore decreasing patient's risk of stroke and/or bleeding complications.   Reason(s) for out of range INR today:  n/a      Plan:  Pt is to continue with current warfarin dosing regimen in order to maintain INR in therapeutic range.     Follow up:  Because warfarin is a high risk medication and current CHEST guidelines recommend regular monitoring intervals (few days up to 12 weeks), will have patient return to clinic in 6 weeks to recheck INR.    Markell Stanford, PharmD    "

## 2018-11-20 DIAGNOSIS — G45.9 TIA (TRANSIENT ISCHEMIC ATTACK): ICD-10-CM

## 2018-12-28 ENCOUNTER — ANTICOAGULATION VISIT (OUTPATIENT)
Dept: MEDICAL GROUP | Facility: PHYSICIAN GROUP | Age: 63
End: 2018-12-28
Payer: COMMERCIAL

## 2018-12-28 DIAGNOSIS — Z98.890 S/P MITRAL VALVE REPAIR: ICD-10-CM

## 2018-12-28 DIAGNOSIS — I63.9 CEREBROVASCULAR ACCIDENT (CVA), UNSPECIFIED MECHANISM (HCC): ICD-10-CM

## 2018-12-28 LAB — INR PPP: 2.6 (ref 2–3.5)

## 2018-12-28 PROCEDURE — 85610 PROTHROMBIN TIME: CPT | Performed by: FAMILY MEDICINE

## 2018-12-28 NOTE — PROGRESS NOTES
Anticoagulation Summary  As of 12/28/2018    INR goal:   2.0-3.0   TTR:   73.1 % (3.4 y)   Today's INR:   2.6   Warfarin maintenance plan:   3 mg (1 mg x 3) on Sun, Tue, Thu; 2 mg (1 mg x 2) all other days   Weekly warfarin total:   17 mg   Plan last modified:   Ramiro ForteD (7/27/2018)   Next INR check:   2/22/2019   Target end date:   Indefinite    Indications    CVA (cerebral vascular accident) (HCC) [I63.9]  S/P mitral valve repair [Z98.890]  Transient cerebral ischemia (Resolved) [G45.9]             Anticoagulation Episode Summary     [facility]   [facility]    Preferred lab:       Send INR reminders to:       Comments:   [place]      Anticoagulation Care Providers     Provider Role Specialty Phone number    Dudley Warner M.D. Referring Cardiology [phone]    Ramiro YepezD Responsible          Anticoagulation Patient Findings  Patient Findings     Negatives:   Signs/symptoms of thrombosis, Signs/symptoms of bleeding, Laboratory test error suspected, Change in health, Change in alcohol use, Change in activity, Upcoming invasive procedure, Emergency department visit, Upcoming dental procedure, Missed doses, Extra doses, Change in medications, Change in diet/appetite, Hospital admission, Bruising, Other complaints        HPI:   Roque Nguyen seen in clinic today, on anticoagulation therapy with warfarin for stroke prevention due to history of CVA and TIA.    Patient's previous INR was therapeutic at 2.0 on 11-16-18, at which time patient was instructed to continue with current warfarin regimen.  He returns to clinic today to recheck INR to ensure it is therapeutic and thus preventing possible clotting and/or bleeding/bruising complications.    CHADS-VASc = n/a  (unadjusted ischemic stroke risk/year:  n/a)    Does patient have any changes to current medical/health status since last appt (Y/N):  NO  Does patient have any signs/symptoms of bleeding and/or thrombosis  "since the last appt (Y/N):  NO  Does patient have any interval changes to diet or medications since last appt (Y/N):  NO  Are there any complications or cost restrictions with current therapy (Y/N):  NO      Vitals:  /74  HR 74    Weight  declines   Height   5' 6\"     Asssessment:      INR remains therapeutic at 2.6, therefore decreasing patient's risk of stroke and/or bleeding complications.   Reason(s) for out of range INR today:  n/a      Plan:  Pt is to continue with current warfarin dosing regimen in order to maintain INR in therapeutic range.     Follow up:  Because warfarin is a high risk medication and current CHEST guidelines recommend regular monitoring intervals (few days up to 12 weeks), will have patient return to clinic in 8 weeks to recheck INR.    Markell Stanford, PharmD    "

## 2019-02-22 ENCOUNTER — ANTICOAGULATION VISIT (OUTPATIENT)
Dept: MEDICAL GROUP | Facility: PHYSICIAN GROUP | Age: 64
End: 2019-02-22
Payer: COMMERCIAL

## 2019-02-22 VITALS — DIASTOLIC BLOOD PRESSURE: 78 MMHG | SYSTOLIC BLOOD PRESSURE: 109 MMHG | HEART RATE: 78 BPM

## 2019-02-22 DIAGNOSIS — Z98.890 S/P MITRAL VALVE REPAIR: ICD-10-CM

## 2019-02-22 DIAGNOSIS — I63.9 CEREBROVASCULAR ACCIDENT (CVA), UNSPECIFIED MECHANISM (HCC): ICD-10-CM

## 2019-02-22 DIAGNOSIS — Z79.01 CHRONIC ANTICOAGULATION: Chronic | ICD-10-CM

## 2019-02-22 LAB — INR PPP: 2.7 (ref 2–3.5)

## 2019-02-22 PROCEDURE — 85610 PROTHROMBIN TIME: CPT | Performed by: FAMILY MEDICINE

## 2019-02-22 PROCEDURE — 93793 ANTICOAG MGMT PT WARFARIN: CPT | Performed by: FAMILY MEDICINE

## 2019-02-22 NOTE — PROGRESS NOTES
Anticoagulation Summary  As of 2019    INR goal:   2.0-3.0   TTR:   74.3 % (3.5 y)   INR used for dosin.7 (2019)   Warfarin maintenance plan:   3 mg (1 mg x 3) every Sun, Tue, Thu; 2 mg (1 mg x 2) all other days   Weekly warfarin total:   17 mg   Plan last modified:   Ramiro ForteD (2018)   Next INR check:   5/3/2019   Target end date:   Indefinite    Indications    CVA (cerebral vascular accident) (HCC) [I63.9]  S/P mitral valve repair [Z98.890]  Transient cerebral ischemia (Resolved) [G45.9]             Anticoagulation Episode Summary     [facility]   [facility]    Preferred lab:       Send INR reminders to:       Comments:   [place]      Anticoagulation Care Providers     Provider Role Specialty Phone number    Dudley Warner M.D. Referring Cardiology [phone]    Ramiro YepezD Responsible          Anticoagulation Patient Findings  Patient Findings     Negatives:   Signs/symptoms of thrombosis, Signs/symptoms of bleeding, Laboratory test error suspected, Change in health, Change in alcohol use, Change in activity, Upcoming invasive procedure, Emergency department visit, Upcoming dental procedure, Missed doses, Extra doses, Change in medications, Change in diet/appetite, Hospital admission, Bruising, Other complaints        HPI:   Roque Nguyen seen in clinic today, on anticoagulation therapy with warfarin for stroke prevention due to history of CVA, TIA, and mitral valve repair.    Patient's previous INR was therapeutic at 2.6 on 18, at which time patient was instructed to continue with current warfarin regimen.  He returns to clinic today to recheck INR to ensure it is therapeutic and thus preventing possible clotting and/or bleeding/bruising complications.    CHADS-VASc = n/a  (unadjusted ischemic stroke risk/year:  n/a)    Does patient have any changes to current medical/health status since last appt (Y/N):  NO  Does patient have any  "signs/symptoms of bleeding and/or thrombosis since the last appt (Y/N):  No  Does patient have any interval changes to diet or medications since last appt (Y/N):  NO  Are there any complications or cost restrictions with current therapy (Y/N):  NO      Vitals:  /78  HR 78    Weight  declines   Height   5' 6\"     Asssessment:      INR remains therapeutic at 2.7, therefore decreasing patient's risk of stroke and/or bleeding complications.   Reason(s) for out of range INR today:  n/a      Plan:  Pt is to continue with current warfarin dosing regimen in order to maintain INR in therapeutic range.     Follow up:  Because warfarin is a high risk medication and current CHEST guidelines recommend regular monitoring intervals (few days up to 12 weeks), will have patient return to clinic in 10 weeks to recheck INR.    Markell Stanford, PharmD    "

## 2019-02-27 ENCOUNTER — OFFICE VISIT (OUTPATIENT)
Dept: CARDIOLOGY | Facility: MEDICAL CENTER | Age: 64
End: 2019-02-27
Payer: COMMERCIAL

## 2019-02-27 VITALS
HEART RATE: 90 BPM | WEIGHT: 193 LBS | DIASTOLIC BLOOD PRESSURE: 68 MMHG | OXYGEN SATURATION: 92 % | SYSTOLIC BLOOD PRESSURE: 124 MMHG | BODY MASS INDEX: 31.02 KG/M2 | HEIGHT: 66 IN

## 2019-02-27 DIAGNOSIS — Z98.890 S/P MITRAL VALVE REPAIR: Chronic | ICD-10-CM

## 2019-02-27 DIAGNOSIS — I63.9 CEREBROVASCULAR ACCIDENT (CVA), UNSPECIFIED MECHANISM (HCC): ICD-10-CM

## 2019-02-27 DIAGNOSIS — Z79.01 CHRONIC ANTICOAGULATION: Chronic | ICD-10-CM

## 2019-02-27 DIAGNOSIS — I44.7 LBBB (LEFT BUNDLE BRANCH BLOCK): ICD-10-CM

## 2019-02-27 DIAGNOSIS — I50.22 CHRONIC SYSTOLIC CONGESTIVE HEART FAILURE, NYHA CLASS 2 (HCC): ICD-10-CM

## 2019-02-27 PROCEDURE — 99214 OFFICE O/P EST MOD 30 MIN: CPT | Performed by: INTERNAL MEDICINE

## 2019-02-27 NOTE — PROGRESS NOTES
Chief Complaint   Patient presents with   • Chest Pain     Follow up       Subjective:   Roque Nguyen is a 63 y.o. male who presents today for follow-up of his history of mitral valve regurgitation status post repair which was complicated by stroke in the setting of antiphospholipid antibody syndrome    He is been tolerating anticoagulation well    Reports he does have considerable stress as his wife has terminal ovarian cancer    Past Medical History:   Diagnosis Date   • Antiphospholipid syndrome ([facility]) 3/15/2010   • ASTHMA    • Cancer ([facility]) 2014    Prostate cancer   • Chronic anticoagulation    • CVA (cerebral vascular accident) ([facility]) 7/28/2015   • Dental disorder     Broken   • Epilepsy ([facility]) 5/14/2018   • Hypertension     On Lisinopril   • LBBB (left bundle branch block)    • LV dysfunction April 2015    Echocardiogram with normal LV size, LVEF 40%. Severely dilated LA. Severe MR, mild TR. RVSP 45mmHg   • Mitral regurgitation April 2015    Echocardiogram with severe MR   • S/P mitral valve repair    • Seizure disorder ([facility]) 2007    Initial diagnosis, last seizure in 2012; followed by neurology, on [facility].   • Snoring    • TIA (transient ischemic attack) 2007/2008    Antiphospholipid antibody   • Tremors of nervous system     mostly left sided     Past Surgical History:   Procedure Laterality Date   • MITRAL VALVE REPAIR  7/20/2015    Procedure: MITRAL VALVE REPAIR  with MICHAEL;  Surgeon: Gina Sim M.D.;  Location: SURGERY [facility] [place] [facility];  Service:    • RECOVERY  5/15/2015    Procedure: [id number]  MITRAL REGURGITATION;  Surgeon: [facility];  Location: SURGERY [facility] DAY [facility];  Service:    • INGUINAL HERNIA REPAIR  4/20/2010    Performed by ROQUE STILL at SURGERY SAME DAY [facility] ORS   • CHOLECYSTECTOMY       Family History   Problem Relation Age of Onset   • Cancer Paternal Uncle    • Lung Disease Mother    • Heart Disease Mother    • Hypertension Mother    • Diabetes  Father    • Heart Disease Father    • Psychiatry Sister    • Hypertension Sister    • Lung Disease Paternal Aunt    • Hypertension Paternal Aunt    • Psychiatry Maternal Grandfather    • Stroke Neg Hx      Social History     Social History   • Marital status:      Spouse name: N/A   • Number of children: N/A   • Years of education: N/A     Occupational History   • Not on file.     Social History Main Topics   • Smoking status: Former Smoker     Packs/day: 1.00     Types: Cigarettes     Start date: 7/29/1985     Quit date: 7/29/2012   • Smokeless tobacco: Never Used   • Alcohol use 0.0 oz/week      Comment: beer occasionally   • Drug use: No   • Sexual activity: Yes     Partners: Female      Comment: , retired [employer]     Other Topics Concern   • Not on file     Social History Narrative    Retired from [employer]     No Known Allergies  Outpatient Encounter Prescriptions as of 2/27/2019   Medication Sig Dispense Refill   • warfarin (COUMADIN) 1 MG Tab TAKE TWO TO THREE TABLETS BY MOUTH DAILY AS DIRECTED BY [facility] 270 Tab 1   • levetiracetam (KEPPRA) 1000 MG tablet Take 2 Tabs by mouth 2 Times a Day. 120 Tab 11   • lacosamide (VIMPAT) 200 MG Tab tablet Take 200 mg by mouth 2 Times a Day for 181 days. 180 Tab 1   • lisinopril (PRINIVIL) 10 MG Tab Take 1 Tab by mouth every day. 90 Tab 3   • carvedilol (COREG) 12.5 MG Tab Take 1 Tab by mouth 2 times a day, with meals. 180 Tab 3   • multivitamin (THERAGRAN) Tab Take 1 Tab by mouth every day.     • calcium citrate (CALCITRATE) 950 MG Tab Take 950 mg by mouth every day.       No facility-administered encounter medications on file as of 2/27/2019.      Review of Systems   Constitutional: Negative for chills and fever.   HENT: Negative for sore throat.    Eyes: Negative for blurred vision.   Respiratory: Negative for cough and shortness of breath.    Cardiovascular: Negative for chest pain, palpitations, claudication, leg swelling and PND.  "  Gastrointestinal: Negative for abdominal pain and nausea.   Musculoskeletal: Negative for falls and joint pain.   Skin: Negative for rash.   Neurological: Positive for tremors. Negative for dizziness, focal weakness and weakness.   Endo/Heme/Allergies: Does not bruise/bleed easily.        Objective:   /68 (BP Location: Left arm, Patient Position: Sitting, BP Cuff Size: Adult)   Pulse 90   Ht 1.676 m (5' 6\")   Wt 87.5 kg (193 lb)   SpO2 92%   BMI 31.15 kg/m²     Physical Exam   Constitutional: No distress.   HENT:   Mouth/Throat: Oropharynx is clear and moist. No oropharyngeal exudate.   Eyes: No scleral icterus.   Neck: No JVD present.   Cardiovascular: Normal rate and normal heart sounds.  Exam reveals no gallop and no friction rub.    No murmur heard.  Pulmonary/Chest: No respiratory distress. He has no wheezes. He has no rales.   Abdominal: Soft. Bowel sounds are normal.   Musculoskeletal: He exhibits no edema.   Neurological: He is alert.   Skin: No rash noted. He is not diaphoretic.   Psychiatric: He has a normal mood and affect.     We reviewed in person the recent labs  Recent Results (from the past 4032 hour(s))   POCT Protime    Collection Time: 10/12/18  8:44 AM   Result Value Ref Range    INR 2.2    POCT Protime    Collection Time: 11/16/18  8:22 AM   Result Value Ref Range    INR 2.0    POCT Protime    Collection Time: 12/28/18  8:28 AM   Result Value Ref Range    INR 2.6    POCT Protime    Collection Time: 02/22/19  8:29 AM   Result Value Ref Range    INR 2.7          Assessment:     1. Chronic anticoagulation     2. Chronic systolic congestive heart failure, NYHA class 2 (HCC)     3. Cerebrovascular accident (CVA), unspecified mechanism (HCC)     4. LBBB (left bundle branch block)     5. S/P mitral valve repair         Medical Decision Making:  Today's Assessment / Status / Plan:     It was my pleasure to meet with Mr. Nguyen.    He understands the risks and benefits of chronic " anticoagulation  Blood pressure is well controlled.      I will see Mr. Nguyen back in 1 year time and encouraged him to follow up with us over the phone or e-mail using my MyChart as issues arise.    It is my pleasure to participate in the care of Mr. Nguyen.  Please do not hesitate to contact me with questions or concerns.    Dudley Warner MD PhD [facility]  Cardiologist [facility]    Please note that this dictation was created using voice recognition software. I have worked with consultants from the vendor as well as technical experts from [facility] to optimize the interface. I have made every reasonable attempt to correct obvious errors, but I expect that there are errors of grammar and possibly content I did not discover before finalizing the note.

## 2019-03-01 ASSESSMENT — ENCOUNTER SYMPTOMS
CHILLS: 0
PND: 0
BLURRED VISION: 0
FEVER: 0
NAUSEA: 0
SHORTNESS OF BREATH: 0
COUGH: 0
SORE THROAT: 0
ABDOMINAL PAIN: 0
DIZZINESS: 0
BRUISES/BLEEDS EASILY: 0
CLAUDICATION: 0
WEAKNESS: 0
FALLS: 0
FOCAL WEAKNESS: 0
PALPITATIONS: 0
TREMORS: 1

## 2019-03-04 DIAGNOSIS — G40.009 PARTIAL IDIOPATHIC EPILEPSY WITH SEIZURES OF LOCALIZED ONSET, NOT INTRACTABLE, WITHOUT STATUS EPILEPTICUS (HCC): ICD-10-CM

## 2019-03-04 NOTE — TELEPHONE ENCOUNTER
Was the patient seen in the last year in this department? Yes    Does patient have an active prescription for medications requested? No     Received Request Via: Patient VIA phone    Prescriber? Margie Long    Pt last seen 10/17/2018  [facility] 4/17/2019

## 2019-03-05 RX ORDER — LACOSAMIDE 200 MG/1
200 TABLET ORAL 2 TIMES DAILY
Qty: 180 TAB | Refills: 5 | Status: SHIPPED
Start: 2019-03-05 | End: 2019-09-02

## 2019-04-04 DIAGNOSIS — I50.9 CONGESTIVE HEART FAILURE (HCC): ICD-10-CM

## 2019-04-05 RX ORDER — LISINOPRIL 10 MG/1
TABLET ORAL
Qty: 90 TAB | Refills: 3 | Status: ON HOLD | OUTPATIENT
Start: 2019-04-05 | End: 2019-08-10

## 2019-04-08 DIAGNOSIS — I51.9 LV DYSFUNCTION: ICD-10-CM

## 2019-04-08 RX ORDER — CARVEDILOL 12.5 MG/1
12.5 TABLET ORAL 2 TIMES DAILY WITH MEALS
Qty: 180 TAB | Refills: 3 | Status: SHIPPED | OUTPATIENT
Start: 2019-04-08

## 2019-04-13 ENCOUNTER — OFFICE VISIT (OUTPATIENT)
Dept: URGENT CARE | Facility: PHYSICIAN GROUP | Age: 64
End: 2019-04-13
Payer: COMMERCIAL

## 2019-04-13 VITALS
DIASTOLIC BLOOD PRESSURE: 76 MMHG | BODY MASS INDEX: 31.18 KG/M2 | HEART RATE: 73 BPM | HEIGHT: 66 IN | SYSTOLIC BLOOD PRESSURE: 116 MMHG | WEIGHT: 194 LBS | TEMPERATURE: 97.4 F | OXYGEN SATURATION: 94 %

## 2019-04-13 DIAGNOSIS — J98.8 RTI (RESPIRATORY TRACT INFECTION): ICD-10-CM

## 2019-04-13 DIAGNOSIS — K42.9 UMBILICAL HERNIA WITHOUT OBSTRUCTION AND WITHOUT GANGRENE: ICD-10-CM

## 2019-04-13 PROCEDURE — 99214 OFFICE O/P EST MOD 30 MIN: CPT | Performed by: PHYSICIAN ASSISTANT

## 2019-04-13 RX ORDER — BENZONATATE 100 MG/1
100 CAPSULE ORAL 3 TIMES DAILY PRN
Qty: 30 CAP | Refills: 0 | Status: SHIPPED | OUTPATIENT
Start: 2019-04-13 | End: 2019-04-24

## 2019-04-13 ASSESSMENT — ENCOUNTER SYMPTOMS
FEVER: 0
COUGH: 1
SHORTNESS OF BREATH: 0

## 2019-04-13 ASSESSMENT — COPD QUESTIONNAIRES: COPD: 0

## 2019-04-13 NOTE — PROGRESS NOTES
"Subjective:   Roque Nguyen is a 63 y.o. male who presents for Nasal Congestion (cough, sore throat, x6 days)        S/P mitral valve replacement- takes coumadin, daily.  Hx of asthma.    1. Pt complains of central abdominal \"lump\" x 4 days.  He noticed that it developed shortly after his coughing began.  It has reduced in size.  Denies abdominal pain, changes in bowel movements, blood in stool, nausea, vomiting.  Hx of left inguinal hernia that has not been repaired. Was previously evaluated by a general surgeon.    2.      Cough   This is a new problem. The current episode started in the past 7 days. The problem has been gradually worsening. The cough is productive of purulent sputum. Associated symptoms include nasal congestion and postnasal drip. Pertinent negatives include no fever or shortness of breath. Treatments tried: lozenges. The treatment provided mild relief. His past medical history is significant for asthma, bronchitis and environmental allergies. There is no history of COPD or emphysema.     Review of Systems   Constitutional: Negative for fever.   HENT: Positive for postnasal drip.    Respiratory: Positive for cough. Negative for shortness of breath.    Endo/Heme/Allergies: Positive for environmental allergies.       PMH:  has a past medical history of Antiphospholipid syndrome ([facility]) (3/15/2010); ASTHMA; Cancer ([facility]) (2014); Chronic anticoagulation; CVA (cerebral vascular accident) ([facility]) (7/28/2015); Dental disorder; Epilepsy ([facility]) (5/14/2018); Hypertension; LBBB (left bundle branch block); LV dysfunction (April 2015); Mitral regurgitation (April 2015); S/P mitral valve repair; Seizure disorder ([facility]) (2007); Snoring; TIA (transient ischemic attack) (2007/2008); and Tremors of nervous system.  MEDS:   Current Outpatient Prescriptions:   •  benzonatate (TESSALON) 100 MG Cap, Take 1 Cap by mouth 3 times a day as needed., Disp: 30 Cap, Rfl: 0  •  carvedilol (COREG) 12.5 MG Tab, Take 1 Tab by " mouth 2 times a day, with meals., Disp: 180 Tab, Rfl: 3  •  lisinopril (PRINIVIL) 10 MG Tab, TAKE ONE TABLET BY MOUTH EVERY DAY, Disp: 90 Tab, Rfl: 3  •  lacosamide (VIMPAT) 200 MG Tab tablet, Take 200 mg by mouth 2 Times a Day for 181 days., Disp: 180 Tab, Rfl: 5  •  warfarin (COUMADIN) 1 MG Tab, TAKE TWO TO THREE TABLETS BY MOUTH DAILY AS DIRECTED BY [facility], Disp: 270 Tab, Rfl: 1  •  levetiracetam (KEPPRA) 1000 MG tablet, Take 2 Tabs by mouth 2 Times a Day., Disp: 120 Tab, Rfl: 11  •  multivitamin (THERAGRAN) Tab, Take 1 Tab by mouth every day., Disp: , Rfl:   •  calcium citrate (CALCITRATE) 950 MG Tab, Take 950 mg by mouth every day., Disp: , Rfl:   •  ondansetron (ZOFRAN ODT) 4 MG TABLET DISPERSIBLE, Take 1 Tab by mouth every 6 hours as needed., Disp: 15 Tab, Rfl: 0  •  tramadol (ULTRAM) 50 MG Tab, Take 1 Tab by mouth every four hours as needed for up to 7 days., Disp: 20 Tab, Rfl: 0  ALLERGIES: No Known Allergies  SURGHX:   Past Surgical History:   Procedure Laterality Date   • MITRAL VALVE REPAIR  7/20/2015    Procedure: MITRAL VALVE REPAIR  with MICHAEL;  Surgeon: Gina Sim M.D.;  Location: SURGERY [facility];  Service:    • RECOVERY  5/15/2015    Procedure: [id number]  MITRAL REGURGITATION;  Surgeon: Ir[facility];  Location: SURGERY SAME DAY [facility];  Service:    • INGUINAL HERNIA REPAIR  4/20/2010    Performed by EJ STILL at SURGERY SAME DAY [facility]   • CHOLECYSTECTOMY       SOCHX:  reports that he quit smoking about 6 years ago. His smoking use included Cigarettes. He started smoking about 33 years ago. He smoked 1.00 pack per day. He has never used smokeless tobacco. He reports that he drinks alcohol. He reports that he does not use drugs.  FH: Family history was reviewed, no pertinent findings to report   Objective:   /76 (BP Location: Right arm, Patient Position: Sitting, BP Cuff Size: Adult)   Pulse 73   Temp 36.3 °C (97.4 °F)  "(Temporal)   Ht 1.676 m (5' 6\")   Wt 88 kg (194 lb)   SpO2 94%   BMI 31.31 kg/m²   Physical Exam   Constitutional: He appears well-developed and well-nourished.  Non-toxic appearance. No distress.   HENT:   Head: Normocephalic and atraumatic.   Right Ear: Tympanic membrane, external ear and ear canal normal.   Left Ear: Tympanic membrane, external ear and ear canal normal.   Nose: Mucosal edema and rhinorrhea present.   Mouth/Throat: Uvula is midline and mucous membranes are normal. Posterior oropharyngeal erythema present.   Neck: Neck supple.   Cardiovascular: Normal rate, regular rhythm, S1 normal, S2 normal and normal heart sounds.  Exam reveals no gallop and no friction rub.    No murmur heard.  Pulmonary/Chest: Effort normal and breath sounds normal. No respiratory distress. He has no decreased breath sounds. He has no wheezes. He has no rhonchi. He has no rales.   Abdominal: Soft. Bowel sounds are normal. There is no tenderness. There is no rigidity, no rebound, no guarding and no tenderness at McBurney's point. A hernia (umbilical, reducible) is present.   Neurological: He is alert.   Skin: Skin is warm and dry. Capillary refill takes less than 2 seconds.   Psychiatric: He has a normal mood and affect. His speech is normal and behavior is normal. Judgment and thought content normal. Cognition and memory are normal.   Vitals reviewed.        Assessment/Plan:   1. Umbilical hernia without obstruction and without gangrene  - US-HERNIA ABDOMEN; Future  - REFERRAL TO GENERAL SURGERY    2. RTI (respiratory tract infection)  - benzonatate (TESSALON) 100 MG Cap; Take 1 Cap by mouth 3 times a day as needed.  Dispense: 30 Cap; Refill: 0    1. US ordered to further evaluate. Referred to [facility]. Red flag symptoms discussed and strict ED precautions given.    2. VSS, no dyspnea, no SOB, and lungs CTA on PE.  Consistent with viral URI without lower respiratory involvement. Goals of care include symptomatic control " and prevention of lower respiratory spread. Signs of lower respiratory involvement discussed with pt.  Pt instructed to RTC if any of these are observed.     Drink plenty of fluids and rest.  Flonase 1 squirt in each nostril, as instructed in clinic, once a day.  Use nasal saline TID to promote drainage.   Salt water gurgles to soothe sore throat.  Start OTC expectorant.  APAP for fever control, and ibuprofen for throat pain/headache relief prn.    If you fail to improve in 2-4 days or symptoms worsen/new symptoms develop, RTC for reevaluation.    F/U with PCP next week.    Impression       1.  Abnormal appearing solid mass with well-defined borders occupies the central portion of the peritoneal cavity and small bowel mesentery as described above. Encasement of vessels is present. Neoplastic lesion is most likely. Carcinoid tumor is a   possibility but spiculated margins and surrounding fibrosis are not present. Other tumor such as sarcoma is possible. The lesion is located adjacent to small bowel loops. Exophytic small bowel tumor is a possibility.    2.  Small right renal cyst is identified.    3.  Poorly defined opacifications are noted in the lungs as described above. Findings could be due to edema infection or inflammation.     1/14/19: Contacted pt with US results. Due to concerns about an incarcerated hernia I advised pt that I would like him to go to the ED for further imaging, as I am unable to arrange outpatient imaging today.  Pt concurs with this plan and states that he will go directly to the main campus.    Differential diagnosis, natural history, supportive care, and indications for immediate follow-up discussed.

## 2019-04-14 ENCOUNTER — APPOINTMENT (OUTPATIENT)
Dept: RADIOLOGY | Facility: MEDICAL CENTER | Age: 64
End: 2019-04-14
Attending: EMERGENCY MEDICINE
Payer: COMMERCIAL

## 2019-04-14 ENCOUNTER — HOSPITAL ENCOUNTER (EMERGENCY)
Facility: MEDICAL CENTER | Age: 64
End: 2019-04-14
Attending: EMERGENCY MEDICINE
Payer: COMMERCIAL

## 2019-04-14 ENCOUNTER — HOSPITAL ENCOUNTER (OUTPATIENT)
Dept: RADIOLOGY | Facility: MEDICAL CENTER | Age: 64
End: 2019-04-14
Attending: PHYSICIAN ASSISTANT
Payer: COMMERCIAL

## 2019-04-14 VITALS
HEIGHT: 68 IN | HEART RATE: 88 BPM | TEMPERATURE: 97.6 F | SYSTOLIC BLOOD PRESSURE: 145 MMHG | RESPIRATION RATE: 16 BRPM | OXYGEN SATURATION: 92 % | DIASTOLIC BLOOD PRESSURE: 78 MMHG | WEIGHT: 195.33 LBS | BODY MASS INDEX: 29.6 KG/M2

## 2019-04-14 DIAGNOSIS — K42.9 UMBILICAL HERNIA WITHOUT OBSTRUCTION AND WITHOUT GANGRENE: ICD-10-CM

## 2019-04-14 DIAGNOSIS — R19.00 ABDOMINAL MASS, UNSPECIFIED ABDOMINAL LOCATION: ICD-10-CM

## 2019-04-14 LAB
ALBUMIN SERPL BCP-MCNC: 3.8 G/DL (ref 3.2–4.9)
ALBUMIN/GLOB SERPL: 1.4 G/DL
ALP SERPL-CCNC: 86 U/L (ref 30–99)
ALT SERPL-CCNC: 12 U/L (ref 2–50)
ANION GAP SERPL CALC-SCNC: 5 MMOL/L (ref 0–11.9)
AST SERPL-CCNC: 18 U/L (ref 12–45)
BASOPHILS # BLD AUTO: 0.3 % (ref 0–1.8)
BASOPHILS # BLD: 0.02 K/UL (ref 0–0.12)
BILIRUB SERPL-MCNC: 0.8 MG/DL (ref 0.1–1.5)
BUN SERPL-MCNC: 14 MG/DL (ref 8–22)
CALCIUM SERPL-MCNC: 8.6 MG/DL (ref 8.5–10.5)
CHLORIDE SERPL-SCNC: 105 MMOL/L (ref 96–112)
CO2 SERPL-SCNC: 27 MMOL/L (ref 20–33)
CREAT SERPL-MCNC: 0.96 MG/DL (ref 0.5–1.4)
EOSINOPHIL # BLD AUTO: 0.14 K/UL (ref 0–0.51)
EOSINOPHIL NFR BLD: 2.2 % (ref 0–6.9)
ERYTHROCYTE [DISTWIDTH] IN BLOOD BY AUTOMATED COUNT: 40.6 FL (ref 35.9–50)
GLOBULIN SER CALC-MCNC: 2.7 G/DL (ref 1.9–3.5)
GLUCOSE SERPL-MCNC: 99 MG/DL (ref 65–99)
HCT VFR BLD AUTO: 39.4 % (ref 42–52)
HGB BLD-MCNC: 13 G/DL (ref 14–18)
IMM GRANULOCYTES # BLD AUTO: 0.02 K/UL (ref 0–0.11)
IMM GRANULOCYTES NFR BLD AUTO: 0.3 % (ref 0–0.9)
INR PPP: 3.01 (ref 0.87–1.13)
LIPASE SERPL-CCNC: 20 U/L (ref 11–82)
LYMPHOCYTES # BLD AUTO: 1.36 K/UL (ref 1–4.8)
LYMPHOCYTES NFR BLD: 21.5 % (ref 22–41)
MCH RBC QN AUTO: 30.2 PG (ref 27–33)
MCHC RBC AUTO-ENTMCNC: 33 G/DL (ref 33.7–35.3)
MCV RBC AUTO: 91.4 FL (ref 81.4–97.8)
MONOCYTES # BLD AUTO: 0.48 K/UL (ref 0–0.85)
MONOCYTES NFR BLD AUTO: 7.6 % (ref 0–13.4)
NEUTROPHILS # BLD AUTO: 4.3 K/UL (ref 1.82–7.42)
NEUTROPHILS NFR BLD: 68.1 % (ref 44–72)
NRBC # BLD AUTO: 0 K/UL
NRBC BLD-RTO: 0 /100 WBC
PLATELET # BLD AUTO: 230 K/UL (ref 164–446)
PMV BLD AUTO: 10.3 FL (ref 9–12.9)
POTASSIUM SERPL-SCNC: 4.2 MMOL/L (ref 3.6–5.5)
PROT SERPL-MCNC: 6.5 G/DL (ref 6–8.2)
PROTHROMBIN TIME: 31.2 SEC (ref 12–14.6)
RBC # BLD AUTO: 4.31 M/UL (ref 4.7–6.1)
SODIUM SERPL-SCNC: 137 MMOL/L (ref 135–145)
WBC # BLD AUTO: 6.3 K/UL (ref 4.8–10.8)

## 2019-04-14 PROCEDURE — 74177 CT ABD & PELVIS W/CONTRAST: CPT

## 2019-04-14 PROCEDURE — 85610 PROTHROMBIN TIME: CPT

## 2019-04-14 PROCEDURE — 83690 ASSAY OF LIPASE: CPT

## 2019-04-14 PROCEDURE — 700117 HCHG RX CONTRAST REV CODE 255: Performed by: EMERGENCY MEDICINE

## 2019-04-14 PROCEDURE — 80053 COMPREHEN METABOLIC PANEL: CPT

## 2019-04-14 PROCEDURE — 85025 COMPLETE CBC W/AUTO DIFF WBC: CPT

## 2019-04-14 PROCEDURE — 76705 ECHO EXAM OF ABDOMEN: CPT

## 2019-04-14 PROCEDURE — 99284 EMERGENCY DEPT VISIT MOD MDM: CPT

## 2019-04-14 RX ORDER — ONDANSETRON 4 MG/1
4 TABLET, ORALLY DISINTEGRATING ORAL EVERY 6 HOURS PRN
Qty: 15 TAB | Refills: 0 | Status: SHIPPED | OUTPATIENT
Start: 2019-04-14 | End: 2019-04-24

## 2019-04-14 RX ORDER — TRAMADOL HYDROCHLORIDE 50 MG/1
50 TABLET ORAL EVERY 4 HOURS PRN
Qty: 20 TAB | Refills: 0 | Status: SHIPPED | OUTPATIENT
Start: 2019-04-14 | End: 2019-04-21

## 2019-04-14 RX ADMIN — IOHEXOL 50 ML: 240 INJECTION, SOLUTION INTRATHECAL; INTRAVASCULAR; INTRAVENOUS; ORAL at 18:23

## 2019-04-14 RX ADMIN — IOHEXOL 100 ML: 350 INJECTION, SOLUTION INTRAVENOUS at 18:45

## 2019-04-14 NOTE — ED PROVIDER NOTES
ED Provider Note    Scribed for Diogenes Anne M.D. by Myriam Johnson. 4/14/2019  4:31 PM    Primary care provider: Tai Fitch M.D.  Means of arrival: Walk in  History obtained from: Patient  History limited by: None    CHIEF COMPLAINT  Chief Complaint   Patient presents with   • Hernia     denies bowel habit changes, denies pain       HPI  Roque Nguyen is a 63 y.o. male who presents to the Emergency Department for evaluation of a hernia. The patient states he went to urgent care yesterday for cold like symptoms of cough and ingestion, but also mentioned he had some abdominal discomfort onset 1 week ago during the visit. The Urgent Care provider ordered an ultrasound of the patient's abdomen at that time, and the patient states he had the ultrasound imaging done earlier today. He mainly came to the ED today as the Urgent Care Provider expressed concerns that his abdominal discomfort could be from a hernia, incarcerated hernia, or mass. The patient denies any significant abdominal pain, but states some mild periumbilical pain is reproducible when lying on his abdomen. He also reports no associated nausea, vomiting, fevers, chills, or diaphoresis. The patient has continued to have normal bowel movements and pass gas. Denies any weight loss. He has prior history of an inguinal hernia in 2010 that was repaired by Dr. Curran at that time. The patient is reported to be on blood thinning medications.     REVIEW OF SYSTEMS  Pertinent negatives include no nausea, vomiting, fevers, chills, diaphoresis, or weight loss. As above, all other systems reviewed and are negative.   See HPI for further details.     PAST MEDICAL HISTORY   has a past medical history of Antiphospholipid syndrome ([facility]) (3/15/2010); ASTHMA; Cancer ([facility]) (2014); Chronic anticoagulation; CVA (cerebral vascular accident) ([facility]) (7/28/2015); Dental disorder; Epilepsy ([facility]) (5/14/2018); Hypertension; LBBB (left bundle branch block); LV dysfunction (April  "2015); Mitral regurgitation (April 2015); S/P mitral valve repair; Seizure disorder ([facility]) (2007); Snoring; TIA (transient ischemic attack) (2007/2008); and Tremors of nervous system.    SURGICAL HISTORY   has a past surgical history that includes inguinal hernia repair (4/20/2010); cholecystectomy; recovery (5/15/2015); and mitral valve repair (7/20/2015).    SOCIAL HISTORY  Social History   Substance Use Topics   • Smoking status: Former Smoker     Packs/day: 1.00     Types: Cigarettes     Start date: 7/29/1985     Quit date: 7/29/2012   • Smokeless tobacco: Never Used   • Alcohol use 0.0 oz/week      Comment: beer occasionally      History   Drug Use No       FAMILY HISTORY  Family History   Problem Relation Age of Onset   • Cancer Paternal Uncle    • Lung Disease Mother    • Heart Disease Mother    • Hypertension Mother    • Diabetes Father    • Heart Disease Father    • Psychiatry Sister    • Hypertension Sister    • Lung Disease Paternal Aunt    • Hypertension Paternal Aunt    • Psychiatry Maternal Grandfather    • Stroke Neg Hx        CURRENT MEDICATIONS  Home Medications     Reviewed by Kunal Graham R.N. (Registered Nurse) on 04/14/19 at 1612  Med List Status: <None>   Medication Last Dose Status   benzonatate (TESSALON) 100 MG Cap  Active   calcium citrate (CALCITRATE) 950 MG Tab  Active   carvedilol (COREG) 12.5 MG Tab  Active   lacosamide (VIMPAT) 200 MG Tab tablet  Active   levetiracetam (KEPPRA) 1000 MG tablet  Active   lisinopril (PRINIVIL) 10 MG Tab  Active   multivitamin (THERAGRAN) Tab  Active   warfarin (COUMADIN) 1 MG Tab  Active                ALLERGIES  No Known Allergies    PHYSICAL EXAM  VITAL SIGNS: /69   Pulse 87   Temp 36.4 °C (97.6 °F) (Temporal)   Resp 16   Ht 1.727 m (5' 8\")   Wt 88.6 kg (195 lb 5.2 oz)   SpO2 95%   BMI 29.70 kg/m²   Constitutional: Well developed, Well nourished, No acute distress, Non-toxic appearance.   HENT: Normocephalic, Atraumatic, Bilateral external " ears normal, Oropharynx is clear mucous membranes are moist. No oral exudates or nasal discharge.   Eyes: Pupils are equal round and reactive, EOMI, Conjunctiva normal, No discharge.   Neck: Normal range of motion, No tenderness, Supple, No stridor. No meningismus.  Cardiovascular: Regular rate and rhythm without murmur rub or gallop.  Thorax & Lungs: Rhonchi in left base. No wheezes or rales. There is no chest wall tenderness.   Abdomen: Umbilical hernia present that is reducible. Hernia popping out at umbilicus with cough but is easily reducible. Soft, non-tender, non-distended. There is no rebound or guarding. No organomegaly is appreciated. Bowel sounds are normal.  Skin: Normal without rash.   Extremities: Intact distal pulses, No edema, No tenderness, No cyanosis, No clubbing. Capillary refill is less than 2 seconds. Negative Erik's sign.   Musculoskeletal: Good range of motion in all major joints. No tenderness to palpation or major deformities noted.   Neurologic: Alert & oriented x 3, Normal motor function, Normal sensory function, No focal deficits noted. Reflexes are normal.  Psychiatric: Affect normal, Judgment normal, Mood normal. There is no suicidal ideation or patient reported hallucinations.     DIAGNOSTIC STUDIES / PROCEDURES    LABS  Labs Reviewed   CBC WITH DIFFERENTIAL - Abnormal; Notable for the following:        Result Value    RBC 4.31 (*)     Hemoglobin 13.0 (*)     Hematocrit 39.4 (*)     MCHC 33.0 (*)     Lymphocytes 21.50 (*)     All other components within normal limits   PROTHROMBIN TIME - Abnormal; Notable for the following:     PT 31.2 (*)     INR 3.01 (*)     All other components within normal limits    Narrative:     Indicate which anticoagulants the patient is on:->COUMADIN   COMP METABOLIC PANEL   LIPASE   ESTIMATED GFR   URINALYSIS,CULTURE IF INDICATED      All labs reviewed by me.    RADIOLOGY  CT-ABDOMEN-PELVIS WITH   Final Result      1.  Abnormal appearing solid mass with  well-defined borders occupies the central portion of the peritoneal cavity and small bowel mesentery as described above. Encasement of vessels is present. Neoplastic lesion is most likely. Carcinoid tumor is a    possibility but spiculated margins and surrounding fibrosis are not present. Other tumor such as sarcoma is possible. The lesion is located adjacent to small bowel loops. Exophytic small bowel tumor is a possibility.      2.  Small right renal cyst is identified.      3.  Poorly defined opacifications are noted in the lungs as described above. Findings could be due to edema infection or inflammation.           The radiologist's interpretation of all radiological studies have been reviewed by me.    COURSE & MEDICAL DECISION MAKING  Nursing notes, VS, PMSFHx reviewed in chart.    4:26 PM - Obtained and reviewed past medical records that indicate patient had an inguinal hernia repaired by Dr. Curran in 2010.     4:31 PM Patient seen and examined at bedside. Ordered for CT-Abdomen/Pelvis, Urinalysis, PT/INR, CMP, Lipase, and CBC with differential to evaluate. He will be kept NPO at this time in case of surgical intervention.     Laboratory evaluation reveals no leukocytosis, shift, anemia, electrolyte derangements, liver or renal dysfunction    6:54 PM - Reviewed patient's lab results which indicate INR is therapeutic and no elevated white count. CT imaging of the abdomen showed 10.8 x 11.4 x 10 cm mass located in the center of the small bowel mesentery, but no liver involvement at this time. I do not believe patient requires further work up in the ED as he can have outpatient follow up with PCP.    6:56 PM - Patient was reevaluated at bedside. He is resting in bed with stable vital signs.  Discussed lab and radiology results with the patient that indicate a mass in the center of his small bowel mesentery. The patient was informed he will be discharged at this time and referred to PCP for outpatient follow  up. He was made aware his PCP will be able to help arrange a biopsy and can refer him to oncology if needed. The patient expresses understanding and is agreeable to discharge. He will receive prescriptions for Zofran and Ultram.    I have signed into and reviewed the patient's prescription monitoring program data prior to prescribing a scheduled drug. The patient will not drink alcohol nor drive with prescribed medications. The patient will return for new or worsening symptoms and is stable at the time of discharge.    In prescribing controlled substances to this patient, I certify that I have obtained and reviewed the medical history of Roque Nguyen. I have also made a good corey effort to obtain applicable records from other providers who have treated the patient and records demonstrating the following: score of150, no increased risk.     I have conducted a physical exam and documented it. I have reviewed Mr. Nguyen’s prescription history as maintained by the [place] Prescription Monitoring Program.  He is given Ultram to be used as needed    I have assessed the patient’s risk for abuse, dependency, and addiction using the validated Opioid Risk Tool available at https://www.mdcalc.com/gzegkt-xkhb-tjbq-ort-narcotic-abuse.     Given the above, I believe the benefits of controlled substance therapy outweigh the risks. The reasons for prescribing controlled substances include non-narcotic, oral analgesic alternatives have been inadequate for pain control. Accordingly, I have discussed the risk and benefits, treatment plan, and alternative therapies with the patient.       DISPOSITION:  Patient will be discharged home in stable condition.    FINAL IMPRESSION  1. Abdominal mass, unspecified abdominal location          Myriam CONLEY (Scribe), am scribing for, and in the presence of, Diogenes Anne M.D..    Electronically signed by: Myriam Rudd), 4/14/2019    Diogenes CONLEY M.D. personally performed the  services described in this documentation, as scribed by Myriam Johnson in my presence, and it is both accurate and complete.    C.    The note accurately reflects work and decisions made by me.  Diogenes Anne  4/14/2019  7:12 PM

## 2019-04-14 NOTE — ED TRIAGE NOTES
Chief Complaint   Patient presents with   • Hernia     denies bowel habit changes, denies pain     Incidental finding at UC

## 2019-04-15 NOTE — DISCHARGE INSTRUCTIONS
Abdominal mass that is characterized by the CAT scan reading that we gave you on discharge.  Please coordinate with your primary doctor who will need to involve likely a medical and surgical oncologist

## 2019-04-15 NOTE — ED NOTES
Discharge instructions given to patient, prescriptions provided, a verbal understanding of all instructions was stated. IV removed, cathlon intact, site without s/s of infection. Pt preferred to walk out accompanied by wife VSS,  all belongings accounted for. Pt instructed on follow up with PCP.

## 2019-04-17 ENCOUNTER — TELEPHONE (OUTPATIENT)
Dept: MEDICAL GROUP | Facility: PHYSICIAN GROUP | Age: 64
End: 2019-04-17

## 2019-04-17 ENCOUNTER — OFFICE VISIT (OUTPATIENT)
Dept: NEUROLOGY | Facility: MEDICAL CENTER | Age: 64
End: 2019-04-17
Payer: COMMERCIAL

## 2019-04-17 VITALS
WEIGHT: 194 LBS | HEIGHT: 68 IN | OXYGEN SATURATION: 92 % | RESPIRATION RATE: 16 BRPM | DIASTOLIC BLOOD PRESSURE: 60 MMHG | SYSTOLIC BLOOD PRESSURE: 116 MMHG | HEART RATE: 75 BPM | TEMPERATURE: 97.6 F | BODY MASS INDEX: 29.4 KG/M2

## 2019-04-17 DIAGNOSIS — G45.9 TIA (TRANSIENT ISCHEMIC ATTACK): ICD-10-CM

## 2019-04-17 DIAGNOSIS — G40.209 PARTIAL EPILEPSY WITH IMPAIRMENT OF CONSCIOUSNESS (HCC): ICD-10-CM

## 2019-04-17 PROCEDURE — 99214 OFFICE O/P EST MOD 30 MIN: CPT | Performed by: NURSE PRACTITIONER

## 2019-04-17 RX ORDER — LORAZEPAM 1 MG/1
TABLET ORAL
Qty: 20 TAB | Refills: 0 | Status: SHIPPED | OUTPATIENT
Start: 2019-04-17 | End: 2019-05-17

## 2019-04-17 ASSESSMENT — PATIENT HEALTH QUESTIONNAIRE - PHQ9: CLINICAL INTERPRETATION OF PHQ2 SCORE: 0

## 2019-04-17 NOTE — PROGRESS NOTES
Subjective:      Roque Nguyen is a 63 y.o. male who presents with Follow-Up (Partial idiopathic epilepsy with seizures of localized onset, not intractable, without status epilepticus)        Here with wife today.    HPI  They come as a couple primarily to discuss Mj's neurology care and to discuss his wife who has terminal and untreated cancer.    Of concern today, he was evaluated in the urgent care for bronchitis issues.  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  They will see PCP in the next few days to further discuss.    Embolic stroke:  Stable with no concerns.  Doing well with coumadin and INR is routinely checked.  Denies an sensory changes, headaches, or weakness.     1/26/2018  ICTAL AND/OR INTERICTAL FINDINGS:   Frequent left temporal sharps and intermittent left temporal slowing noted. No clinical events or seizures were reported or recorded during the study.     6/2018: Brain MRI   Impression       1.  Encephalomalacia in the left parietal lobe.  2.  Chronic lacunar infarcts in the cerebellum and basal ganglia.  3.  Mild cerebral atrophy.  4.  Mild chronic microvascular ischemic disease.  5.  There is no evidence of hippocampal sclerosis, cortical dysplasia or neuronal migrational abnormalities.         Current Outpatient Prescriptions   Medication Sig Dispense Refill   • LORazepam (ATIVAN) 1 MG Tab Take 1/2-1 tablet PO PRN breakthrough seizures.  Do not exceed more than 2 tablets in 24 hours unless directed otherwise by physician. 20 Tab 0   • carvedilol (COREG) 12.5 MG Tab Take 1 Tab by mouth 2 times a day, with meals. 180 Tab 3   • lisinopril (PRINIVIL) 10 MG Tab TAKE ONE TABLET BY MOUTH EVERY DAY 90 Tab 3   • lacosamide (VIMPAT) 200 MG Tab tablet Take 200 mg by mouth 2 Times a Day for 181 days. 180 Tab 5   • warfarin (COUMADIN) 1 MG Tab TAKE TWO TO THREE TABLETS BY MOUTH DAILY AS DIRECTED BY [facility] 270 Tab 1   • levetiracetam (KEPPRA) 1000 MG tablet Take 2  "Tabs by mouth 2 Times a Day. 120 Tab 11   • multivitamin (THERAGRAN) Tab Take 1 Tab by mouth every day.     • calcium citrate (CALCITRATE) 950 MG Tab Take 950 mg by mouth every day.     • Fexofenadine HCl (MUCINEX ALLERGY PO) Take  by mouth as needed.     • enoxaparin (LOVENOX) 120 MG/0.8ML Solution inj Inject 120 mg as instructed every day. Or as instructed by the anticoagulation [facility] (Patient taking differently: Inject 1 Syringe as instructed every day. Or as instructed by the anticoagulation [facility]; start 4.25.19 -hold am 4.29.19) 10 Syringe 1     No current facility-administered medications for this visit.        Review of Systems   Constitutional: Positive for malaise/fatigue.   HENT: Negative for hearing loss, nosebleeds and sore throat.         No recent head injury.   Eyes: Negative for double vision.        No new loss of vision.   Respiratory: Positive for cough.         No recent lung infections.   Cardiovascular: Negative for chest pain.   Gastrointestinal: Negative for abdominal pain, diarrhea, nausea and vomiting.   Genitourinary: Negative.    Musculoskeletal: Negative.    Skin: Negative.    Neurological: Negative for seizures and headaches.   Endo/Heme/Allergies:        No history of endocrine dysfunction.  No new problems.   Psychiatric/Behavioral: Positive for depression and memory loss. The patient is not nervous/anxious.         No recent mood changes.          Objective:     /60 (BP Location: Left arm, Patient Position: Sitting, BP Cuff Size: Adult)   Pulse 75   Temp 36.4 °C (97.6 °F) (Temporal)   Resp 16   Ht 1.727 m (5' 7.99\")   Wt 88 kg (194 lb)   SpO2 92%   BMI 29.50 kg/m²      Physical Exam   Constitutional: He is oriented to person, place, and time. He appears well-developed and well-nourished. No distress.   HENT:   Head: Normocephalic and atraumatic.   Nose: Nose normal.   No new hearing loss.   Eyes: EOM are normal.   No double vision or loss of vision.   Neck: Normal range " "of motion.   Cardiovascular: Normal rate and regular rhythm.    No recent chest pain.   Pulmonary/Chest: Effort normal.   Abdominal: He exhibits distension. There is no tenderness.   Genitourinary:   Genitourinary Comments: No recent infections.   Musculoskeletal: Normal range of motion.   Lymphadenopathy:     He has no cervical adenopathy.   Neurological: He is alert and oriented to person, place, and time. He has normal strength and normal reflexes. He displays tremor (bilateral upper extremity tremor). No cranial nerve deficit. He displays no seizure activity. Gait normal.   No observable changes in neurologic status.  See initial new patient examination for details.     Skin: Skin is warm and dry.   Psychiatric: His mood appears not anxious. He exhibits a depressed mood.   Devastating news shared during our appointment today.  Appropriately upset.    Occasional tremor in his voice                 Assessment/Plan:     Localization-related epilepsy with subsequent embolic stroke secondary to surgery for heart valve replacement:  Last known seizure was March 2012.  Last EEG on record was in 2018.     He is a primary  for the family as his 2 sons have autism and his wife who has terminal cancer for which she has stopped treatment.     Lengthy conversation with \"Mj\" Roque again about driving.  He is to drive on extremely limited basis, to limit highly/freeway driving.  He needs to be awake at least 1 hour prior to driving each time.     Continue Keppra 2000 mg twice a day and Vimpat 200 mg twice a day.       New Rx for ativan 1mg tablets prn.    Obtain labs as ordered when able to combine with future lab requests.    Return for follow-up care in 6 months.    I spent 45+ minutes with this patient, over fifty percent was spent counseling patient on their condition, best management practices, reviewing test results and risks and benefits of treatment.          "

## 2019-04-17 NOTE — TELEPHONE ENCOUNTER
VOICEMAIL  1. Caller Name: Roque                       Call Back Number: [phone] (home) [phone] (work)     2. Message: Went to  then sent to ER and he has a tumor in his abd. He needs to [facility] with PCP for a Bx     3. Patient approves office to leave a detailed voicemail/MyChart message: N\A    LVMTCB

## 2019-04-18 ENCOUNTER — OFFICE VISIT (OUTPATIENT)
Dept: MEDICAL GROUP | Facility: PHYSICIAN GROUP | Age: 64
End: 2019-04-18
Payer: COMMERCIAL

## 2019-04-18 VITALS
SYSTOLIC BLOOD PRESSURE: 124 MMHG | OXYGEN SATURATION: 92 % | DIASTOLIC BLOOD PRESSURE: 70 MMHG | WEIGHT: 194.8 LBS | TEMPERATURE: 98.6 F | HEART RATE: 67 BPM | RESPIRATION RATE: 12 BRPM | BODY MASS INDEX: 31.31 KG/M2 | HEIGHT: 66 IN

## 2019-04-18 DIAGNOSIS — R19.00 ABDOMINAL MASS, UNSPECIFIED ABDOMINAL LOCATION: ICD-10-CM

## 2019-04-18 PROCEDURE — 99213 OFFICE O/P EST LOW 20 MIN: CPT | Performed by: INTERNAL MEDICINE

## 2019-04-18 NOTE — PROGRESS NOTES
PRIMARY CARE CLINIC FOLLOW UP VISIT  Chief Complaint   Patient presents with   • Other     abdominal mass     History of Present Illness     Here with his wife. His wife is a patient of Dr. Aliya Castellanos, my colleague, and informs me today that she has terminal gynecological malignancy. They are not just stressed by her diagnosis but by the following concern for Roque as well,     Abdominal mass  Roque was seen in urgent care 4/13/2019 for URI symptoms and incidentally noted an abdominal mass that he first palpated about 2 weeks ago. Denies unintentional weight loss, fevers, night sweats, chills, nausea, vomiting, changes in bowel habits. The initial ultrasound in urgent care was suspicious so he was routed to the ER where a CT a/p noted:    1.  Abnormal appearing solid mass with well-defined borders occupies the central portion of the peritoneal cavity and small bowel mesentery as described above. Encasement of vessels is present. Neoplastic lesion is most likely. Carcinoid tumor is a   possibility but spiculated margins and surrounding fibrosis are not present. Other tumor such as sarcoma is possible. The lesion is located adjacent to small bowel loops. Exophytic small bowel tumor is a possibility.    Current Outpatient Prescriptions   Medication Sig Dispense Refill   • benzonatate (TESSALON) 100 MG Cap Take 1 Cap by mouth 3 times a day as needed. 30 Cap 0   • carvedilol (COREG) 12.5 MG Tab Take 1 Tab by mouth 2 times a day, with meals. 180 Tab 3   • lisinopril (PRINIVIL) 10 MG Tab TAKE ONE TABLET BY MOUTH EVERY DAY 90 Tab 3   • lacosamide (VIMPAT) 200 MG Tab tablet Take 200 mg by mouth 2 Times a Day for 181 days. 180 Tab 5   • warfarin (COUMADIN) 1 MG Tab TAKE TWO TO THREE TABLETS BY MOUTH DAILY AS DIRECTED BY [facility] 270 Tab 1   • levetiracetam (KEPPRA) 1000 MG tablet Take 2 Tabs by mouth 2 Times a Day. 120 Tab 11   • multivitamin (THERAGRAN) Tab Take 1 Tab by mouth every day.     •  calcium citrate (CALCITRATE) 950 MG Tab Take 950 mg by mouth every day.     • LORazepam (ATIVAN) 1 MG Tab Take 1/2-1 tablet PO PRN breakthrough seizures.  Do not exceed more than 2 tablets in 24 hours unless directed otherwise by physician. 20 Tab 0   • ondansetron (ZOFRAN ODT) 4 MG TABLET DISPERSIBLE Take 1 Tab by mouth every 6 hours as needed. 15 Tab 0   • tramadol (ULTRAM) 50 MG Tab Take 1 Tab by mouth every four hours as needed for up to 7 days. 20 Tab 0     No current facility-administered medications for this visit.      Past Medical History:   Diagnosis Date   • Antiphospholipid syndrome ([facility]) 3/15/2010   • ASTHMA    • Cancer ([facility]) 2014    Prostate cancer   • Chronic anticoagulation    • CVA (cerebral vascular accident) ([facility]) 7/28/2015   • Dental disorder     Broken   • Epilepsy ([facility]) 5/14/2018   • Hypertension     On Lisinopril   • LBBB (left bundle branch block)    • LV dysfunction April 2015    Echocardiogram with normal LV size, LVEF 40%. Severely dilated LA. Severe MR, mild TR. RVSP 45mmHg   • Mitral regurgitation April 2015    Echocardiogram with severe MR   • S/P mitral valve repair    • Seizure disorder ([facility]) 2007    Initial diagnosis, last seizure in 2012; followed by neurology, on [facility].   • Snoring    • TIA (transient ischemic attack) 2007/2008    Antiphospholipid antibody   • Tremors of nervous system     mostly left sided     Past Surgical History:   Procedure Laterality Date   • MITRAL VALVE REPAIR  7/20/2015    Procedure: MITRAL VALVE REPAIR  with MICHAEL;  Surgeon: Gina Sim M.D.;  Location: SURGERY [facility];  Service:    • RECOVERY  5/15/2015    Procedure: [id number]  MITRAL REGURGITATION;  Surgeon: [facility];  Location: SURGERY SAME DAY [facility];  Service:    • INGUINAL HERNIA REPAIR  4/20/2010    Performed by EJ STILL at SURGERY SAME DAY [facility] ORS   • CHOLECYSTECTOMY       Social History   Substance Use Topics   • Smoking status: Former Smoker      "Packs/day: 1.00     Types: Cigarettes     Start date: 1985     Quit date: 2012   • Smokeless tobacco: Never Used   • Alcohol use 0.0 oz/week      Comment: beer occasionally     Social History     Social History Narrative    Retired from [employer]     Family History   Problem Relation Age of Onset   • Cancer Paternal Uncle    • Lung Disease Mother    • Heart Disease Mother    • Hypertension Mother    • Diabetes Father    • Heart Disease Father    • Psychiatry Sister    • Hypertension Sister    • Lung Disease Paternal Aunt    • Hypertension Paternal Aunt    • Psychiatry Maternal Grandfather    • Stroke Neg Hx      Family Status   Relation Status   • PUnc    • Mo    • Fa Alive   • Sis Alive   • MAunt Alive   • MUnc Alive   • PAunt Alive   • MGMo    • MGFa    • PGMo    • PGFa    • Neg Hx (Not Specified)     Allergies: Patient has no known allergies.    ROS  As per HPI above. All other systems reviewed and negative.        Objective   /70 (BP Location: Left arm, Patient Position: Sitting, BP Cuff Size: Adult)   Pulse 67   Temp 37 °C (98.6 °F) (Temporal)   Resp 12   Ht 1.676 m (5' 6\")   Wt 88.4 kg (194 lb 12.8 oz)   SpO2 92%  Body mass index is 31.44 kg/m².    General: alert and oriented, pleasant, cooperative  HEENT: Normocephalic, atraumatic.   Gastrointestinal: mass palpated above umbilicus, otherwise abdomen is soft and non-tender. No hepatosplenomegaly. Normoactive bowel sounds   Lymphatics: no cervical or supraclavicular lymphadenopathy   Skin: warm and dry, no lesions or rashes  Psychiatric: appropriate mood and affect. Good insight and appropriate judgment     Assessment and Plan   The following treatment plan was discussed     1. Abdominal mass, unspecified abdominal location  Given CT findings and exam his findings are concerning for possible malignancy, given urgent referral to [facility] to help streamline diagnosis. He has a history of antiphospholipid " syndrome on warfarin and has had a stroke while he had a gap in anticoagulation, will need lovenox bridging.   - REFERRAL TO INTAKE ONCOLOGY COORDINATOR      Healthcare maintenance     There are no preventive care reminders to display for this patient.    Return if symptoms worsen or fail to improve.    Tai Fitch MD  Internal Medicine  [facility]

## 2019-04-18 NOTE — ASSESSMENT & PLAN NOTE
Roque was seen in urgent care 4/13/2019 for URI symptoms and incidentally noted an abdominal mass that he first palpated about 2 weeks ago. Denies unintentional weight loss, fevers, night sweats, chills, nausea, vomiting, changes in bowel habits. The initial ultrasound in urgent care was suspicious so he was routed to the ER where a CT a/p noted:    1.  Abnormal appearing solid mass with well-defined borders occupies the central portion of the peritoneal cavity and small bowel mesentery as described above. Encasement of vessels is present. Neoplastic lesion is most likely. Carcinoid tumor is a   possibility but spiculated margins and surrounding fibrosis are not present. Other tumor such as sarcoma is possible. The lesion is located adjacent to small bowel loops. Exophytic small bowel tumor is a possibility.

## 2019-04-18 NOTE — Clinical Note
Peggy, this gentlemen has a suspicious abdominal finding on his CT and needs tissue diagnosis. He has a history of antiphospholipid and has thrown a clot before when he had a gap in his OAC.

## 2019-04-23 ENCOUNTER — OFFICE VISIT (OUTPATIENT)
Dept: HEMATOLOGY ONCOLOGY | Facility: MEDICAL CENTER | Age: 64
End: 2019-04-23
Payer: COMMERCIAL

## 2019-04-23 ENCOUNTER — TELEPHONE (OUTPATIENT)
Dept: HEMATOLOGY ONCOLOGY | Facility: MEDICAL CENTER | Age: 64
End: 2019-04-23

## 2019-04-23 ENCOUNTER — ANTICOAGULATION MONITORING (OUTPATIENT)
Dept: VASCULAR LAB | Facility: MEDICAL CENTER | Age: 64
End: 2019-04-23

## 2019-04-23 VITALS
HEART RATE: 72 BPM | DIASTOLIC BLOOD PRESSURE: 62 MMHG | BODY MASS INDEX: 30.84 KG/M2 | OXYGEN SATURATION: 94 % | WEIGHT: 191.91 LBS | TEMPERATURE: 97.3 F | HEIGHT: 66 IN | RESPIRATION RATE: 16 BRPM | SYSTOLIC BLOOD PRESSURE: 116 MMHG

## 2019-04-23 DIAGNOSIS — Z98.890 S/P MITRAL VALVE REPAIR: ICD-10-CM

## 2019-04-23 DIAGNOSIS — I63.9 CEREBROVASCULAR ACCIDENT (CVA), UNSPECIFIED MECHANISM (HCC): ICD-10-CM

## 2019-04-23 DIAGNOSIS — R19.06 EPIGASTRIC MASS: ICD-10-CM

## 2019-04-23 DIAGNOSIS — Z79.01 CHRONIC ANTICOAGULATION: Chronic | ICD-10-CM

## 2019-04-23 PROCEDURE — 99205 OFFICE O/P NEW HI 60 MIN: CPT | Performed by: NURSE PRACTITIONER

## 2019-04-23 ASSESSMENT — ENCOUNTER SYMPTOMS
ABDOMINAL PAIN: 0
INSOMNIA: 1
DIZZINESS: 0
SHORTNESS OF BREATH: 0
SPUTUM PRODUCTION: 1
SORE THROAT: 0
CONSTIPATION: 0
COUGH: 1
BLOOD IN STOOL: 0
VOMITING: 0
NAUSEA: 0
CHILLS: 0
DIARRHEA: 0
HEADACHES: 0
WHEEZING: 1
MYALGIAS: 0
WEIGHT LOSS: 0
FEVER: 0
HEMOPTYSIS: 0
PALPITATIONS: 0

## 2019-04-23 ASSESSMENT — PAIN SCALES - GENERAL: PAINLEVEL: NO PAIN

## 2019-04-23 NOTE — PROGRESS NOTES
Subjective:      Roque Nguyen is a 63 y.o. male who presents as a New Patient for abdominal mass.        HPI    Patient referred to me, Intake Oncology Coordinator by his PCP Dr. Fitch for abdominal mass.  Patient is accompanied by his wife for today's visit.    Patient was recently seen in urgent care for what he believed to be allergy congestions or bronchitis.  He was seen at that time by an urgent care provider and was told that he did have bronchitis.  Patient is currently on Coumadin and concern for antibiotics so patient was given supportive care including Mucinex, Flonase and saline solution rinses.  Patient stated overall he is feeling better and those symptoms have resolved.  However at that appointment he did mention to the urgent care provider that he felt a rock hard mass in his abdominal area approximately 2 weeks ago and he was since then sent for an Ultrasound.  Ultrasound completed on April 14, 2019 showed a complex mass in the anterior abdomen wall region measuring 11.2 x 10.4 x 10.5 cm in size.  He was then asked to go to the emergency department based on these findings.  During the ER visit he had a CT scan of the abdomen and pelvis with contrast completed.  CT scan showed an abnormal appearing solid mass with well-defined borders that occupies the central portion of the peritoneal cavity and small bowel mesentery.  There is encasement of vessels present.  Neoplastic lesion is likely according to the reading radiologist.  Differentials include carcinoid tumor versus sarcoma.  The lesion is located adjacent to small bowel loops, therefore an exophytic small bowel tumor could be a possibility as well.  I personally reviewed both imaging reports and images in detail and reviewed with the patient and his wife today.    Patient stated his biggest concern was congestion concerning for either allergies or bronchitis.  Cough has slowly resolved as well as sputum production and wheezing.  He denies  any sore throat at this time.  He denies any fevers, chills, fatigue or weight loss.  His appetite is been stable.  He denies any abdominal pain, nausea vomiting, constipation or diarrhea.  He is voiding without difficulty and denies any hematuria.  He denies any pain, rashes or itching, dizziness or headaches.    Patient does have a history of prostate cancer diagnosed in 2014.  He was treated with radiation therapy by Dr. Kenyon.  He has not had much follow-up with urology however he was recently seen last year.  His last PSA was May 2018 which was at 0.62.    Patient also with a history of antiphospholipid syndrome.  He established with hematologist in the past with Dr. Dupont, however he has not seen her since 2016.  He currently is on Coumadin and managed by the Coumadin clinic.    Please see past medical and surgical history below.    Patient does have a family history of cancer in his paternal uncle who was diagnosed with lung cancer.    Patient does have a history of smoking.  He quit in 2012.  He stated he smoked for approximately 37 years at an average of 1 pack/day.    No Known Allergies  Current Outpatient Prescriptions on File Prior to Visit   Medication Sig Dispense Refill   • carvedilol (COREG) 12.5 MG Tab Take 1 Tab by mouth 2 times a day, with meals. 180 Tab 3   • lisinopril (PRINIVIL) 10 MG Tab TAKE ONE TABLET BY MOUTH EVERY DAY 90 Tab 3   • lacosamide (VIMPAT) 200 MG Tab tablet Take 200 mg by mouth 2 Times a Day for 181 days. 180 Tab 5   • warfarin (COUMADIN) 1 MG Tab TAKE TWO TO THREE TABLETS BY MOUTH DAILY AS DIRECTED BY [facility] 270 Tab 1   • levetiracetam (KEPPRA) 1000 MG tablet Take 2 Tabs by mouth 2 Times a Day. 120 Tab 11   • multivitamin (THERAGRAN) Tab Take 1 Tab by mouth every day.     • calcium citrate (CALCITRATE) 950 MG Tab Take 950 mg by mouth every day.     • LORazepam (ATIVAN) 1 MG Tab Take 1/2-1 tablet PO PRN breakthrough seizures.  Do not exceed more than 2  tablets in 24 hours unless directed otherwise by physician. 20 Tab 0   • ondansetron (ZOFRAN ODT) 4 MG TABLET DISPERSIBLE Take 1 Tab by mouth every 6 hours as needed. 15 Tab 0   • benzonatate (TESSALON) 100 MG Cap Take 1 Cap by mouth 3 times a day as needed. 30 Cap 0     No current facility-administered medications on file prior to visit.      Past Medical History:   Diagnosis Date   • Antiphospholipid syndrome ([facility]) 3/15/2010   • ASTHMA    • Cancer ([facility]) 2014    Prostate cancer   • Chronic anticoagulation    • CVA (cerebral vascular accident) ([facility]) 7/28/2015   • Dental disorder     Broken   • Epilepsy ([facility]) 5/14/2018   • Hypertension     On Lisinopril   • LBBB (left bundle branch block)    • LV dysfunction April 2015    Echocardiogram with normal LV size, LVEF 40%. Severely dilated LA. Severe MR, mild TR. RVSP 45mmHg   • Mitral regurgitation April 2015    Echocardiogram with severe MR   • S/P mitral valve repair    • Seizure disorder ([facility]) 2007    Initial diagnosis, last seizure in 2012; followed by neurology, on [facility].   • Snoring    • TIA (transient ischemic attack) 2007/2008    Antiphospholipid antibody   • Tremors of nervous system     mostly left sided     Past Surgical History:   Procedure Laterality Date   • MITRAL VALVE REPAIR  7/20/2015    Procedure: MITRAL VALVE REPAIR  with MICHAEL;  Surgeon: Gina Sim M.D.;  Location: SURGERY [facility];  Service:    • RECOVERY  5/15/2015    Procedure: [id number]  MITRAL REGURGITATION;  Surgeon: Ir-Recovery Surgery;  Location: SURGERY SAME DAY [facility];  Service:    • INGUINAL HERNIA REPAIR  4/20/2010    Performed by EJ STILL at SURGERY SAME DAY [facility] ORS   • CHOLECYSTECTOMY         Family History   Problem Relation Age of Onset   • Cancer Paternal Uncle         Lung cancer   • Lung Disease Mother    • Heart Disease Mother    • Hypertension Mother    • Diabetes Father    • Heart Disease Father    • Psychiatry Sister    • Hypertension  "Sister    • Lung Disease Paternal Aunt    • Hypertension Paternal Aunt    • Psychiatry Maternal Grandfather    • Stroke Neg Hx      Social History     Social History   • Marital status:      Spouse name: N/A   • Number of children: 2   • Years of education: N/A     Social History Main Topics   • Smoking status: Former Smoker     Packs/day: 1.00     Years: 37.00     Types: Cigarettes     Start date: 7/29/1985     Quit date: 7/29/2012   • Smokeless tobacco: Never Used   • Alcohol use 0.0 oz/week   • Drug use: No   • Sexual activity: Yes     Partners: Female      Comment: , retired [employer]     Other Topics Concern   • Not on file     Social History Narrative    Retired from [employer]         Review of Systems   Constitutional: Negative for chills, fever, malaise/fatigue and weight loss.   HENT: Negative for congestion and sore throat (resolved now with supportive care).    Respiratory: Positive for cough, sputum production and wheezing. Negative for hemoptysis and shortness of breath.    Cardiovascular: Negative for chest pain and palpitations.   Gastrointestinal: Negative for abdominal pain, blood in stool, constipation, diarrhea, nausea and vomiting.   Genitourinary: Negative for dysuria and hematuria.   Musculoskeletal: Negative for myalgias.   Skin: Negative for itching and rash.   Neurological: Negative for dizziness and headaches.   Psychiatric/Behavioral: The patient has insomnia (chronic per patient based on previous career).           Objective:     /62 (BP Location: Right arm, Patient Position: Sitting, BP Cuff Size: Adult)   Pulse 72   Temp 36.3 °C (97.3 °F) (Temporal)   Resp 16   Ht 1.676 m (5' 6\")   Wt 87.1 kg (191 lb 14.6 oz)   SpO2 94%   BMI 30.98 kg/m²      Physical Exam   Constitutional: He is oriented to person, place, and time. He appears well-developed and well-nourished. No distress.   HENT:   Head: Normocephalic and atraumatic.   Mouth/Throat: Oropharynx is clear and moist. No " oropharyngeal exudate.   Eyes: Pupils are equal, round, and reactive to light. Conjunctivae and EOM are normal. Right eye exhibits no discharge. Left eye exhibits no discharge. No scleral icterus.   Neck: Normal range of motion. Neck supple. No thyromegaly present.   Cardiovascular: Normal rate, regular rhythm, normal heart sounds and intact distal pulses.  Exam reveals no gallop and no friction rub.    No murmur heard.  Pulmonary/Chest: Effort normal and breath sounds normal.   Abdominal: Soft. Bowel sounds are normal. He exhibits no distension. There is no tenderness.   Musculoskeletal: Normal range of motion. He exhibits no edema or tenderness.   Lymphadenopathy:        Head (right side): No submental, no submandibular, no tonsillar, no preauricular, no posterior auricular and no occipital adenopathy present.        Head (left side): No submental, no submandibular, no tonsillar, no preauricular, no posterior auricular and no occipital adenopathy present.     He has no cervical adenopathy.        Right: No supraclavicular adenopathy present.        Left: No supraclavicular adenopathy present.   Neurological: He is alert and oriented to person, place, and time.   Skin: Skin is warm and dry. No rash noted. He is not diaphoretic. No erythema. No pallor.   Psychiatric: He has a normal mood and affect. His behavior is normal.   Vitals reviewed.         Ct-abdomen-pelvis With    Result Date: 4/14/2019 4/14/2019 6:07 PM HISTORY/REASON FOR EXAM:  Palpable lump in abdomen TECHNIQUE/EXAM DESCRIPTION: CT scan of the abdomen and pelvis with contrast. Contrast-enhanced helical scanning was obtained from the diaphragmatic domes through the pubic symphysis following the bolus administration of 100 mL of Optiray 350 nonionic contrast without complication. Low dose optimization technique was utilized for this CT exam including automated exposure control and adjustment of the mA and/or kV according to patient size. COMPARISON: No  prior studies available. FINDINGS: CT Abdomen: Scattered areas of mild poorly defined opacification are noted in the lower lungs. The liver is normal in appearance. The spleen is normal. 2 cm exophytic right renal cyst appears to be present. No hydronephrosis. The adrenal glands are normal. The pancreas is normal. The aorta is normal in caliber.  No evidence of retroperitoneal adenopathy. Abnormal lobulated appearing solid mass is identified centrally in the peritoneal cavity which is mainly homogeneously enhancing but also contains areas of lower enhancement which could indicate necrosis. Dimensions are 10.8 x 11.4 x 10 cm in size. The lesion is centered in the small bowel mesentery. There are some adjacent loops of small bowel. The lesion encases a portion of the superior mesenteric artery and either encases or compresses the superior mesenteric vein. Postcholecystectomy changes are noted. CT Pelvis: There is no evidence of bowel obstruction or inflammatory change. The appendix is visualized and appears normal. There is no evidence of free fluid. Right inguinal hernia contains abdominal fat.     1.  Abnormal appearing solid mass with well-defined borders occupies the central portion of the peritoneal cavity and small bowel mesentery as described above. Encasement of vessels is present. Neoplastic lesion is most likely. Carcinoid tumor is a possibility but spiculated margins and surrounding fibrosis are not present. Other tumor such as sarcoma is possible. The lesion is located adjacent to small bowel loops. Exophytic small bowel tumor is a possibility. 2.  Small right renal cyst is identified. 3.  Poorly defined opacifications are noted in the lungs as described above. Findings could be due to edema infection or inflammation.     Us-hernia Abdomen    Result Date: 4/14/2019 4/14/2019 10:13 AM HISTORY/REASON FOR EXAM:  Palpable lump umbilical area TECHNIQUE/EXAM DESCRIPTION AND NUMBER OF VIEWS: Ultrasound of  anterior abdominal wall and anterior abdominal cavity COMPARISON: None FINDINGS: Complex cystic and solid mass is identified superior to the umbilicus which appears to be located in the abdominal wall measuring 11.2 x 10.4 x 10.5 cm in size. No other masses are noted.     1.  Complex mass in the anterior abdominal wall region is identified as described above. Neoplastic lesion is a possibility. Incarcerated hernia would be in the differential diagnosis. Inflammatory or infectious lesion is also possible although vascular flow does not appear prominent. CT abdomen and pelvis with contrast is recommended for further evaluation.       Assessment/Plan:     1. Epigastric mass  US-NEEDLE BX-ABD-RETROPERITONEAL   2. Chronic anticoagulation  US-NEEDLE BX-ABD-RETROPERITONEAL       Plan  1.  Patient with a very large abdominal mass measuring 11 cm in greatest dimension concerning for malignancy.  Patient in need of a biopsy of this mass.  I have ordered a biopsy of this mass to be completed as soon as possible.  However patient is on chronic anticoagulation on Coumadin and is followed closely by the Coumadin clinic.  Patient will require bridging of his Coumadin in order to proceed with the biopsy.  I personally spoke with the anticoagulation clinic asking for their assistance in making certain that this can be taken care of as soon as possible.  Patient will be scheduled for the biopsy and following biopsy results he will follow-up with me in the clinic to discuss the results and further plan of care.    I did speak with the pharmacist within the Coumadin clinic and they will assist on bridging the patient off the Coumadin onto Lovenox in preparation for the biopsy.    Patient and wife both verbalized understanding and agreement the plan.      Spent 60 minutes in direct, face-to-face patient contact in which greater than 50% of the visit was spent counseling and coordinating of care.     Please note that this dictation was  created using voice recognition software. I have made every reasonable attempt to correct obvious errors, but I expect that there are errors of grammar and possibly content that I did not discover before finalizing the note.

## 2019-04-23 NOTE — TELEPHONE ENCOUNTER
Spoke personally with Nancy, pharmacist with the anticoagulation clinic letting her know that patient's biopsy is scheduled for Monday morning.  She stated she will contact the patient and begin the process of bridging off Coumadin to Lovenox for him to proceed with the biopsy.    Patient will follow up with me in the clinic after the results of the biopsy discussed plan of care.

## 2019-04-23 NOTE — PROGRESS NOTES
[facility]    Patient will be having a biopsy done on Monday-  April 29, 2019 and will need to be off his warfarin for 5 days.  CrCl= 80.4 ml/min    Lovenox (enoxaparin) 120mg QD rx sent to [facility] pharmacy.  Spoke with patient's wife regarding instructions and sent hard copy to patient via email.    Days prior to procedure:  5- Wednesday: April 24 - No warfarin, No enoxaparin  4 - Thursday: April 25:  No warfarin, begin enoxaparin 120 mg once daily  3 - Friday: April 26: No warfarin, continue enoxaparin 120 mg once daily  2 -Saturday: April 27:  No warfarin, continue enoxaparin 120 mg once daily  1 - Sunday: April 28:  No warfarin, Stop enoxaparin    Day of procedure: Monday, April 29  Operating physician to determine start date for enoxaparin and warfarin.  If Physician approves, may restart warfarin 3mg this night    Days after procedure:  1 - Tuesday: April 30:  Begin warfarin 3mg and [id number] mg once daily if physician agrees  2 - Wednesday: May 1: Continue warfarin 3mg and enoxaparin 120 mg once daily  3 - Thursday: May 2: Continue warfarin 3mg and enoxaparin 120 mg once daily  4 - Friday: May 3:  Have INR checked in clinic at [place]  8:30am    Dion RubioD

## 2019-04-23 NOTE — TELEPHONE ENCOUNTER
Left message for patients wife to return call in regards to biopsy.    Biopsy  Date: Monday 4/29/19  Time: Check in at 6:00 am, biopsy at 8:00 am  Location: [facility], same day surgery  Preparation: Nothing to eat or drink 6 hours prior, will need a  home, call pre admit at [phone].    Patient will follow up with TEZ Levine on Wednesday 5/1/19 at 10:00 am.

## 2019-04-24 ENCOUNTER — APPOINTMENT (OUTPATIENT)
Dept: ADMISSIONS | Facility: MEDICAL CENTER | Age: 64
End: 2019-04-24
Attending: INTERNAL MEDICINE
Payer: COMMERCIAL

## 2019-04-29 ENCOUNTER — APPOINTMENT (OUTPATIENT)
Dept: RADIOLOGY | Facility: MEDICAL CENTER | Age: 64
End: 2019-04-29
Attending: NURSE PRACTITIONER
Payer: COMMERCIAL

## 2019-04-29 ENCOUNTER — HOSPITAL ENCOUNTER (OUTPATIENT)
Facility: MEDICAL CENTER | Age: 64
End: 2019-04-29
Attending: INTERNAL MEDICINE | Admitting: INTERNAL MEDICINE
Payer: COMMERCIAL

## 2019-04-29 VITALS
BODY MASS INDEX: 30.72 KG/M2 | SYSTOLIC BLOOD PRESSURE: 126 MMHG | HEIGHT: 66 IN | RESPIRATION RATE: 15 BRPM | WEIGHT: 191.14 LBS | DIASTOLIC BLOOD PRESSURE: 70 MMHG | OXYGEN SATURATION: 93 % | TEMPERATURE: 97.7 F | HEART RATE: 93 BPM

## 2019-04-29 DIAGNOSIS — Z79.01 CHRONIC ANTICOAGULATION: Chronic | ICD-10-CM

## 2019-04-29 DIAGNOSIS — R19.06 EPIGASTRIC MASS: ICD-10-CM

## 2019-04-29 LAB
INR PPP: 1.25 (ref 0.87–1.13)
PATHOLOGY CONSULT NOTE: NORMAL
PROTHROMBIN TIME: 15.9 SEC (ref 12–14.6)

## 2019-04-29 PROCEDURE — 700111 HCHG RX REV CODE 636 W/ 250 OVERRIDE (IP): Performed by: RADIOLOGY

## 2019-04-29 PROCEDURE — 88307 TISSUE EXAM BY PATHOLOGIST: CPT

## 2019-04-29 PROCEDURE — 88360 TUMOR IMMUNOHISTOCHEM/MANUAL: CPT

## 2019-04-29 PROCEDURE — 99153 MOD SED SAME PHYS/QHP EA: CPT

## 2019-04-29 PROCEDURE — 88305 TISSUE EXAM BY PATHOLOGIST: CPT

## 2019-04-29 PROCEDURE — 160002 HCHG RECOVERY MINUTES (STAT)

## 2019-04-29 PROCEDURE — 88341 IMHCHEM/IMCYTCHM EA ADD ANTB: CPT | Mod: 91

## 2019-04-29 PROCEDURE — 88342 IMHCHEM/IMCYTCHM 1ST ANTB: CPT

## 2019-04-29 PROCEDURE — 700101 HCHG RX REV CODE 250

## 2019-04-29 PROCEDURE — 85610 PROTHROMBIN TIME: CPT

## 2019-04-29 PROCEDURE — 700111 HCHG RX REV CODE 636 W/ 250 OVERRIDE (IP)

## 2019-04-29 RX ORDER — LIDOCAINE HYDROCHLORIDE 20 MG/ML
INJECTION, SOLUTION INFILTRATION; PERINEURAL
Status: DISCONTINUED
Start: 2019-04-29 | End: 2019-04-29 | Stop reason: HOSPADM

## 2019-04-29 RX ORDER — HYDROMORPHONE HYDROCHLORIDE 2 MG/ML
0.5 INJECTION, SOLUTION INTRAMUSCULAR; INTRAVENOUS; SUBCUTANEOUS
Status: DISCONTINUED | OUTPATIENT
Start: 2019-04-29 | End: 2019-04-29 | Stop reason: HOSPADM

## 2019-04-29 RX ORDER — ONDANSETRON 2 MG/ML
4 INJECTION INTRAMUSCULAR; INTRAVENOUS PRN
Status: DISCONTINUED | OUTPATIENT
Start: 2019-04-29 | End: 2019-04-29 | Stop reason: HOSPADM

## 2019-04-29 RX ORDER — ONDANSETRON 2 MG/ML
4 INJECTION INTRAMUSCULAR; INTRAVENOUS EVERY 8 HOURS PRN
Status: DISCONTINUED | OUTPATIENT
Start: 2019-04-29 | End: 2019-04-29 | Stop reason: HOSPADM

## 2019-04-29 RX ORDER — OXYCODONE HYDROCHLORIDE 5 MG/1
5 TABLET ORAL
Status: DISCONTINUED | OUTPATIENT
Start: 2019-04-29 | End: 2019-04-29 | Stop reason: HOSPADM

## 2019-04-29 RX ORDER — SODIUM CHLORIDE 9 MG/ML
500 INJECTION, SOLUTION INTRAVENOUS
Status: DISCONTINUED | OUTPATIENT
Start: 2019-04-29 | End: 2019-04-29 | Stop reason: HOSPADM

## 2019-04-29 RX ORDER — MIDAZOLAM HYDROCHLORIDE 1 MG/ML
.5-2 INJECTION INTRAMUSCULAR; INTRAVENOUS PRN
Status: DISCONTINUED | OUTPATIENT
Start: 2019-04-29 | End: 2019-04-29 | Stop reason: HOSPADM

## 2019-04-29 RX ORDER — MIDAZOLAM HYDROCHLORIDE 1 MG/ML
INJECTION INTRAMUSCULAR; INTRAVENOUS
Status: COMPLETED
Start: 2019-04-29 | End: 2019-04-29

## 2019-04-29 RX ADMIN — MIDAZOLAM HYDROCHLORIDE 1 MG: 1 INJECTION, SOLUTION INTRAMUSCULAR; INTRAVENOUS at 08:42

## 2019-04-29 RX ADMIN — FENTANYL CITRATE 25 MCG: 50 INJECTION, SOLUTION INTRAMUSCULAR; INTRAVENOUS at 08:42

## 2019-04-29 RX ADMIN — FENTANYL CITRATE 25 MCG: 50 INJECTION, SOLUTION INTRAMUSCULAR; INTRAVENOUS at 08:52

## 2019-04-29 RX ADMIN — MIDAZOLAM HYDROCHLORIDE 1 MG: 1 INJECTION, SOLUTION INTRAMUSCULAR; INTRAVENOUS at 08:51

## 2019-04-29 RX ADMIN — LIDOCAINE HYDROCHLORIDE: 20 INJECTION, SOLUTION INFILTRATION; PERINEURAL at 08:52

## 2019-04-29 NOTE — OR SURGEON
Immediate Post- Operative Note    PostOp Diagnosis: PERITONEAL MASS OR ADENOPATHY      Procedure(s): CT GUIDED PERITONEAL  MASS BIOPSY    18G CORES X 6. FORMALIN X5. RPMI X 1.       Estimated Blood Loss: <1CC        Complications: NONE            4/29/2019     9:00 AM     Steven Marcus

## 2019-04-29 NOTE — OR NURSING
0930  PT RECEIVED FROM IR,  S/P ABDOMINAL MASS BX.  BX SITE IS SOFT WITH DRESSING INTACT.  DENIES ANY PAIN.  WIFE IS @ BEDSIDE.    1000  PT TAKING PO FLUID AND SNACK OK.  BX SITE IS CLEAR.    1050   BX SITE IS CLEAR.  DC INSTRUCTIONS GIVEN TO PT AND FAMILY.  VERBALIZED UNDERSTANDING OF ALL.  HL DC.  PT DC TO HOME,  VIA WHEELCHAIR,  ESCORTED OUT BY CNA.

## 2019-04-29 NOTE — DISCHARGE INSTRUCTIONS
ACTIVITY: Rest and take it easy for the first 24 hours.  A responsible adult is recommended to remain with you during that time.  It is normal to feel sleepy.  We encourage you to not do anything that requires balance, judgment or coordination.    MILD FLU-LIKE SYMPTOMS ARE NORMAL. YOU MAY EXPERIENCE GENERALIZED MUSCLE ACHES, THROAT IRRITATION, HEADACHE AND/OR SOME NAUSEA.    FOR 24 HOURS DO NOT:  Drive, operate machinery or run household appliances.  Drink beer or alcoholic beverages.   Make important decisions or sign legal documents.    SPECIAL INSTRUCTIONS: *KEEP INCISION DRY FOR 24 HRS**    DIET: To avoid nausea, slowly advance diet as tolerated, avoiding spicy or greasy foods for the first day.  Add more substantial food to your diet according to your physician's instructions.  Babies can be fed formula or breast milk as soon as they are hungry.  INCREASE FLUIDS AND FIBER TO AVOID CONSTIPATION.    SURGICAL DRESSING/BATHING: *MAY SHOWER TOMORROW AFTERNOON THEN MAY REMOVE DRESSING**    FOLLOW-UP APPOINTMENT:  A follow-up appointment should be arranged with your doctor in *DR ZARAGO[facility]  [phone] **; call to schedule.    You should CALL YOUR PHYSICIAN if you develop:  Fever greater than 101 degrees F.  Pain not relieved by medication, or persistent nausea or vomiting.  Excessive bleeding (blood soaking through dressing) or unexpected drainage from the wound.  Extreme redness or swelling around the incision site, drainage of pus or foul smelling drainage.  Inability to urinate or empty your bladder within 8 hours.  Problems with breathing or chest pain.    You should call 911 if you develop problems with breathing or chest pain.  If you are unable to contact your doctor or surgical center, you should go to the nearest emergency room or urgent care center.  Physician's telephone #: *[phone]**    If any questions arise, call your doctor.  If your doctor is not available, please feel free to call the Surgical Center at  [phone]  The Center is open Monday through Friday from 7AM to 7PM.  You can also call the HEALTH HOTLINE open 24 hours/day, 7 days/week and speak to a nurse at [phone], or toll free at [phone].    A registered nurse may call you a few days after your surgery to see how you are doing after your procedure.    MEDICATIONS: Resume taking daily medication.  Take prescribed pain medication with food.  If no medication is prescribed, you may take non-aspirin pain medication if needed.  PAIN MEDICATION CAN BE VERY CONSTIPATING.  Take a stool softener or laxative such as senokot, pericolace, or milk of magnesia if needed.      If your physician has prescribed pain medication that includes Acetaminophen (Tylenol), do not take additional Acetaminophen (Tylenol) while taking the prescribed medication.    Depression / Suicide Risk    As you are discharged from this [facility] facility, it is important to learn how to keep safe from harming yourself.    Recognize the warning signs:  · Abrupt changes in personality, positive or negative- including increase in energy   · Giving away possessions  · Change in eating patterns- significant weight changes-  positive or negative  · Change in sleeping patterns- unable to sleep or sleeping all the time   · Unwillingness or inability to communicate  · Depression  · Unusual sadness, discouragement and loneliness  · Talk of wanting to die  · Neglect of personal appearance   · Rebelliousness- reckless behavior  · Withdrawal from people/activities they love  · Confusion- inability to concentrate     If you or a loved one observes any of these behaviors or has concerns about self-harm, here's what you can do:  · Talk about it- your feelings and reasons for harming yourself  · Remove any means that you might use to hurt yourself (examples: pills, rope, extension cords, firearm)  · Get professional help from the community (Mental Health, Substance Abuse, psychological  counseling)  · Do not be alone:Call your Safe Contact- someone whom you trust who will be there for you.  · Call your local CRISIS HOTLINE 863-8591 or 186-106-9266  · Call your local Children's Mobile Crisis Response Team [place] (082) 195-8307 or www.[employer]  · Call the toll free National Suicide Prevention Hotlines   · National Suicide Prevention Lifeline 598-557-JWLO (2459)  · National Hope Line Network 800-SUICIDE (665-6842)

## 2019-04-29 NOTE — PROGRESS NOTES
IR Procedure RN's Note:    Patient consent by Dr. Marcus in PPU prior to procedure; pt and MD signed consent and placed in chart.      Abdominal mass biopsy done by Dr. Marcus; left mid-abdominal access site; pt assessed upon arrival, pt A/O 4, pt aware of the procedure, pt baseline - calm and cooperative, cores x 5 in formalin core x 1 in RPMI sent to pathology; pt appears comfortable throughout the procedure with no signs of distress or discomfort; range of 30-37  EtCO2 monitored; access site CDI, soft with no signs of hematoma or bleeding, gauze and tegaderm; telephone report given to ARNULFO Pelayo. Pt transported to PPU with IR RN.

## 2019-04-30 ENCOUNTER — TELEPHONE (OUTPATIENT)
Dept: HEMATOLOGY ONCOLOGY | Facility: MEDICAL CENTER | Age: 64
End: 2019-04-30

## 2019-04-30 ASSESSMENT — ENCOUNTER SYMPTOMS
VOMITING: 0
DIARRHEA: 0
NERVOUS/ANXIOUS: 0
MEMORY LOSS: 1
ABDOMINAL PAIN: 0
MUSCULOSKELETAL NEGATIVE: 1
NAUSEA: 0
COUGH: 1
HEADACHES: 0
SORE THROAT: 0
DOUBLE VISION: 0
DEPRESSION: 1
SEIZURES: 0

## 2019-04-30 NOTE — TELEPHONE ENCOUNTER
Brenda Montgomery ( Physician Pathology ) called regarding this patient. Please return her call ASAP. thank you     [id number]

## 2019-05-01 ENCOUNTER — APPOINTMENT (OUTPATIENT)
Dept: HEMATOLOGY ONCOLOGY | Facility: MEDICAL CENTER | Age: 64
End: 2019-05-01
Payer: COMMERCIAL

## 2019-05-02 ENCOUNTER — OFFICE VISIT (OUTPATIENT)
Dept: HEMATOLOGY ONCOLOGY | Facility: MEDICAL CENTER | Age: 64
End: 2019-05-02
Payer: COMMERCIAL

## 2019-05-02 VITALS
HEIGHT: 67 IN | OXYGEN SATURATION: 95 % | WEIGHT: 193.67 LBS | HEART RATE: 84 BPM | TEMPERATURE: 97.5 F | BODY MASS INDEX: 30.4 KG/M2 | SYSTOLIC BLOOD PRESSURE: 102 MMHG | DIASTOLIC BLOOD PRESSURE: 60 MMHG | RESPIRATION RATE: 14 BRPM

## 2019-05-02 DIAGNOSIS — C49.A0 MALIGNANT GASTROINTESTINAL STROMAL TUMOR, UNSPECIFIED SITE (HCC): ICD-10-CM

## 2019-05-02 PROCEDURE — 99213 OFFICE O/P EST LOW 20 MIN: CPT | Performed by: NURSE PRACTITIONER

## 2019-05-02 ASSESSMENT — PAIN SCALES - GENERAL: PAINLEVEL: NO PAIN

## 2019-05-02 ASSESSMENT — ENCOUNTER SYMPTOMS
CONSTIPATION: 1
ABDOMINAL PAIN: 0
NAUSEA: 0
CHILLS: 0
FEVER: 0
VOMITING: 0
DIARRHEA: 0

## 2019-05-02 NOTE — PROGRESS NOTES
Subjective:      Roque Nguyen is a 63 y.o. male who presents with Follow-Up (biopsy results).        HPI    Patient seen today in follow-up for biopsy results.  He presents accompanied by his wife for today's visit.    Patient tolerated the biopsy well.  He denies any significant changes to his current clinical status.  He does complain of constipation but is still having bowel movements.  I have been in conversation with the pathologist on the case.  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  I did discuss the results of the biopsy to the patient and his wife today.    No Known Allergies  Current Outpatient Prescriptions on File Prior to Visit   Medication Sig Dispense Refill   • carvedilol (COREG) 12.5 MG Tab Take 1 Tab by mouth 2 times a day, with meals. 180 Tab 3   • lisinopril (PRINIVIL) 10 MG Tab TAKE ONE TABLET BY MOUTH EVERY DAY 90 Tab 3   • lacosamide (VIMPAT) 200 MG Tab tablet Take 200 mg by mouth 2 Times a Day for 181 days. 180 Tab 5   • warfarin (COUMADIN) 1 MG Tab TAKE TWO TO THREE TABLETS BY MOUTH DAILY AS DIRECTED BY [facility] 270 Tab 1   • levetiracetam (KEPPRA) 1000 MG tablet Take 2 Tabs by mouth 2 Times a Day. 120 Tab 11   • Fexofenadine HCl (MUCINEX ALLERGY PO) Take  by mouth as needed.     • enoxaparin (LOVENOX) 120 MG/0.8ML Solution inj Inject 120 mg as instructed every day. Or as instructed by the anticoagulation Clinc (Patient not taking: Reported on 5/2/2019) 10 Syringe 1   • LORazepam (ATIVAN) 1 MG Tab Take 1/2-1 tablet PO PRN breakthrough seizures.  Do not exceed more than 2 tablets in 24 hours unless directed otherwise by physician. (Patient not taking: Reported on 5/2/2019) 20 Tab 0   • multivitamin (THERAGRAN) Tab Take 1 Tab by mouth every day.     • calcium citrate (CALCITRATE) 950 MG Tab Take 950 mg by mouth every day.       No current facility-administered medications on file prior to visit.        Review of  "Systems   Constitutional: Negative for chills and fever.   Gastrointestinal: Positive for constipation. Negative for abdominal pain, diarrhea, nausea and vomiting.          Objective:     /60 (BP Location: Left arm, Patient Position: Sitting, BP Cuff Size: Adult)   Pulse 84   Temp 36.4 °C (97.5 °F) (Temporal)   Resp 14   Ht 1.702 m (5' 7\")   Wt 87.9 kg (193 lb 10.8 oz)   SpO2 95%   BMI 30.33 kg/m²       Physical Exam   Constitutional: He is oriented to person, place, and time. He appears well-developed and well-nourished. No distress.   HENT:   Head: Normocephalic and atraumatic.   Cardiovascular: Normal rate, regular rhythm and normal heart sounds.  Exam reveals no gallop and no friction rub.    No murmur heard.  Pulmonary/Chest: Effort normal and breath sounds normal. No respiratory distress. He has no wheezes.   Musculoskeletal: Normal range of motion. He exhibits no edema or tenderness.   Neurological: He is alert and oriented to person, place, and time.   Skin: He is not diaphoretic.   Vitals reviewed.    FINAL DIAGNOSIS:    A. Abdominal mass biopsy:         Spindle cell neoplasm most consistent with Gastrointestinal          stromal tumor (GIST).      Comment: Even in the absence of DOG1 positivity, the histologic  features and immunoprofile are most suggestive of a gastrointestinal  stromal tumor (GIST). The risk assessment is best preformed on the  final resection specimen.  This case has been reviewed in conjunction  with Dr. Breaux and Dr. Allen, who agree with the above diagnosis.       Assessment/Plan:     1. Malignant gastrointestinal stromal tumor, unspecified site (HCC)  REFERRAL TO GENERAL SURGERY     Plan  1.  Patient with a large abdominal mass status post biopsy showing spinal cell neoplasm most consistent with gastrointestinal stromal tumor.  Based on these findings I will place an urgent referral to surgery for further evaluation.  I have spoken with Dr. Ganser who has agreed to " see the patient.  Patient and wife both verbalized understanding of the plan.    Patient was diagnosed with antiphospholipid disorder and establish care with hematologist, Dr. Dupont.  I will reach out to Dr. Dupont regarding patient's results and have patient follow-up with her as well in the future.  Both patient and wife are was understanding and were in agreement the plan.    No further follow-up needed with the C.

## 2019-05-03 ENCOUNTER — ANTICOAGULATION VISIT (OUTPATIENT)
Dept: MEDICAL GROUP | Facility: PHYSICIAN GROUP | Age: 64
End: 2019-05-03
Payer: COMMERCIAL

## 2019-05-03 VITALS — DIASTOLIC BLOOD PRESSURE: 77 MMHG | SYSTOLIC BLOOD PRESSURE: 109 MMHG | HEART RATE: 81 BPM

## 2019-05-03 DIAGNOSIS — Z98.890 S/P MITRAL VALVE REPAIR: ICD-10-CM

## 2019-05-03 DIAGNOSIS — Z79.01 CHRONIC ANTICOAGULATION: Chronic | ICD-10-CM

## 2019-05-03 DIAGNOSIS — I63.9 CEREBROVASCULAR ACCIDENT (CVA), UNSPECIFIED MECHANISM (HCC): ICD-10-CM

## 2019-05-03 LAB — INR PPP: 2 (ref 2–3.5)

## 2019-05-03 PROCEDURE — 85610 PROTHROMBIN TIME: CPT | Performed by: NURSE PRACTITIONER

## 2019-05-03 PROCEDURE — 93793 ANTICOAG MGMT PT WARFARIN: CPT | Performed by: FAMILY MEDICINE

## 2019-05-03 NOTE — PROGRESS NOTES
Anticoagulation Summary  As of 5/3/2019    INR goal:   2.0-3.0   TTR:   74.8 % (3.7 y)   INR used for dosin.00 (5/3/2019)   Warfarin maintenance plan:   3 mg (1 mg x 3) every Sun, Tue, Thu; 2 mg (1 mg x 2) all other days   Weekly warfarin total:   17 mg   Plan last modified:   Ramiro ForteD (2018)   Next INR check:   2019   Target end date:   Indefinite    Indications    CVA (cerebral vascular accident) (HCC) [I63.9]  S/P mitral valve repair [Z98.890]  Transient cerebral ischemia (Resolved) [G45.9]             Anticoagulation Episode Summary     [facility]   [facility]    Preferred lab:       Send INR reminders to:       Comments:   [place]      Anticoagulation Care Providers     Provider Role Specialty Phone number    Dudley Warner M.D. Referring Cardiology [phone]    Ramiro YepezD Responsible          Anticoagulation Patient Findings  Patient Findings     Negatives:   Signs/symptoms of thrombosis, Signs/symptoms of bleeding, Laboratory test error suspected, Change in health, Change in alcohol use, Change in activity, Upcoming invasive procedure, Emergency department visit, Upcoming dental procedure, Missed doses, Extra doses, Change in medications, Change in diet/appetite, Hospital admission, Bruising, Other complaints        HPI:   Roque Nguyen seen in clinic today, on anticoagulation therapy with warfarin for stroke prevention due to history of CVA, TIA, and mitral valve repair.    Patient's previous INR was subtherapeutic at 1.25 on 19 (inpatient), at which time patient was instructed to increase warfarin regimen along with bridge therapy.  He returns to clinic today to recheck INR to ensure it is therapeutic and thus preventing possible clotting and/or bleeding/bruising complications.    CHADS-VASc = n/a  (unadjusted ischemic stroke risk/year:  n/a)    Does patient have any changes to current medical/health status since last appt (Y/N):   "NO  Does patient have any signs/symptoms of bleeding and/or thrombosis since the last appt (Y/N):  NO  Does patient have any interval changes to diet or medications since last appt (Y/N):  NO  Are there any complications or cost restrictions with current therapy (Y/N):  NO      Vitals:  /77  HR 81    Weight  declines   Height   5' 7\"     Asssessment:      INR therapeutic at 2.0, therefore decreasing patient's risk of stroke and/or bleeding complications.   Reason(s) for out of range INR today:  n/a      Plan:  Pt is to continue with current warfarin dosing regimen in order to maintain INR in therapeutic range.  Patient is able to discontinue lovenox at this point as well.     Follow up:  Because warfarin is a high risk medication and current CHEST guidelines recommend regular monitoring intervals (few days up to 12 weeks), will have patient return to clinic in 2 weeks to recheck INR.    Markell Stanford, PharmD    "

## 2019-05-06 ENCOUNTER — TELEPHONE (OUTPATIENT)
Dept: HEMATOLOGY ONCOLOGY | Facility: MEDICAL CENTER | Age: 64
End: 2019-05-06

## 2019-05-06 DIAGNOSIS — C49.A3 GIST (GASTROINTESTINAL STROMAL TUMOR) OF SMALL BOWEL, MALIGNANT (HCC): ICD-10-CM

## 2019-05-06 NOTE — TELEPHONE ENCOUNTER
Patient was referred to surgeon, Dr. Ganser due to new diagnosis of GIST.  Per Dr. Ganser he is concerned as the tumor appears to be encasing the artery and vein supplying the small bowel and would recommend patient be referred to a tertiary center.  I did contact Dr. Jeferson Loya with [facility] and personally spoke with him today regarding the case.  He has agreed to see the patient and will get him in as soon as possible at his institution.    I did personally speak with the patient and his wife today who verbalized understanding of the plan.     1. 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

## 2019-05-06 NOTE — TELEPHONE ENCOUNTER
At the request of Peggy REAGAN, I called Dr Jeferson Duarte , Left a message/page for the doctor to return Peggy Sin call at [facility] [phone], Bartolome CAMPOS Stated that it may take 24 Hours for the doctor  to return our call.

## 2019-05-07 ENCOUNTER — TELEPHONE (OUTPATIENT)
Dept: HEMATOLOGY ONCOLOGY | Facility: MEDICAL CENTER | Age: 64
End: 2019-05-07

## 2019-05-07 NOTE — TELEPHONE ENCOUNTER
Left message for patients wife to return call in regards to [facility] Referral. TEZ Levine is following up to see if [facility] has contacted them to schedule yet. Referral was faxed URGENT on 5/6/19.

## 2019-05-07 NOTE — TELEPHONE ENCOUNTER
Spoke to patients wife Gabi letting her know we have contacted the provider at [facility] and he will contact his office and get them scheduled ASAP as long as there is no problems with insurance. Patients wife verbalized understanding and will keep us updated on when patient is scheduled.

## 2019-05-07 NOTE — TELEPHONE ENCOUNTER
Gabi called stated that she did not hear back from [facility] so she decided to call them back. They told her that it could take up to 2-3 business days to process. Patient would like for us to contact them so that they understand this is an urgent matter.    We may contact Gabi for further questions or updates at [phone].

## 2019-05-08 NOTE — TELEPHONE ENCOUNTER
Gabi called back and she told me that Dr. Loya office called her today to schedule appointment which is on 05/10/2019 at 9am. She is very grateful that we were able to help her out with us and she apologizes that she was not able to answer her phone because she was busy organizing the trip to go to [place] for this appointment but she is thankful.

## 2019-05-08 NOTE — TELEPHONE ENCOUNTER
Received confirmation that [facility] did receive URGENT referral.    Left message for patients wife Gabi to return call to see if [facility] has called them to schedule with Dr. Vincenzo amaro.

## 2019-05-09 DIAGNOSIS — C49.A3 GIST (GASTROINTESTINAL STROMAL TUMOR) OF SMALL BOWEL, MALIGNANT (HCC): ICD-10-CM

## 2019-05-09 NOTE — PROGRESS NOTES
Patient had established with Dr. Dupont for hematologic issue back in 2016.  Patient requesting to be referred back to Dr. Dupont.  He currently is being seen at [facility] this coming Friday, May 10 however I will go ahead and place referral to Dr. Dupont for when patient returns.  Dr. Dupont is aware of the referral.

## 2019-05-10 ENCOUNTER — TELEPHONE (OUTPATIENT)
Dept: HEMATOLOGY ONCOLOGY | Facility: MEDICAL CENTER | Age: 64
End: 2019-05-10

## 2019-05-10 DIAGNOSIS — C80.1 CANCER (HCC): ICD-10-CM

## 2019-05-10 DIAGNOSIS — R19.06 EPIGASTRIC MASS: ICD-10-CM

## 2019-05-10 NOTE — PROGRESS NOTES
Patient with abdominal mass, biopsy shows spindle cell neoplasm most consistent with gastrointestinal stromal tumor.  Patient has been referred to [place] for further evaluation Dr. Loya requests PET be completed prior to follow-up with their facility for surgical intervention.  Order placed.

## 2019-05-10 NOTE — TELEPHONE ENCOUNTER
PET Scan  Date: Thursday 5/16/19  Time: Check in at 12:00 pm, scan 12:30 pm  Location: [address] [place] [address]  Diet begins on Wednesday 5/15/19 at 12:30 pm      Preparation Instructions for a PET/CT Scan    • Please check in 30 minutes before your appointment time with current insurance card, photo ID and required Co-payment or deductible.    Accuracy of the PET/CT scan depends heavily on your body’s metabolic activity and we need you to follow the diet one day prior to your appointment.  This preparation, which includes Minimal amounts of carbohydrates is necessary to ensure excellent imaging quality.    Foods allowed 1 day before test  • All meats (beef, pork, poultry, etc.) fish, eggs, cheese,              Tofu, and unsweetened peanut butter  • Non-starchy vegetables (e.g. green beans, broccoli,              Spinach, zucchini, and lettuce)  • Water, and diet soda  • Margarine, butter, and oils  • Any foods containing 5 grams or less of carbohydrate              per serving      Foods NOT allowed 1 day before test  • Peas, corn, potato, and dry beans  • Milk, and non-dairy milk drinks  • Fruit, and fruit juice  • All cereals, grains, pastas, and breads  • Sugar, honey, jams, and candy  • Gravy  • Any foods with more than 5 grams of              Carbohydrate per serving    Day of test  • Six hours prior to the PET scan, no food              or drink (except water) is allowed.  (This includes gum, mints and cough drops).    If you are diabetic, please contact us for special arrangements.    • Do NOT perform any strenuous of vigorous activity (jogging, weight lifting, etc.) for at least 24 hours prior to the scan.    • Please wear warm, comfortable clothing with no metal parts (zipper, etc.)    What to expect once you arrive at the PET/CT Center  • You will be registered at the   • You will be escorted to a quiet prep area where your blood glucose level with be checked.  • An IV will be placed into your arm  • A  small amount of a radioactive material will be injected, then the IV removed   • After resting quietly for 45-60 minutes, the PET/CT scan will be performed with you lying flat on the table with your arms above your head, lasting approximately 20-30 minutes      The appointment will take approximately 2 hours.   Please contact us at [phone] if there are any questions.

## 2019-05-16 ENCOUNTER — APPOINTMENT (OUTPATIENT)
Dept: RADIOLOGY | Facility: MEDICAL CENTER | Age: 64
End: 2019-05-16
Attending: NURSE PRACTITIONER
Payer: COMMERCIAL

## 2019-05-17 ENCOUNTER — ANTICOAGULATION VISIT (OUTPATIENT)
Dept: MEDICAL GROUP | Facility: PHYSICIAN GROUP | Age: 64
End: 2019-05-17
Payer: COMMERCIAL

## 2019-05-17 VITALS — HEART RATE: 75 BPM | SYSTOLIC BLOOD PRESSURE: 101 MMHG | DIASTOLIC BLOOD PRESSURE: 72 MMHG

## 2019-05-17 DIAGNOSIS — I63.9 CEREBROVASCULAR ACCIDENT (CVA), UNSPECIFIED MECHANISM (HCC): ICD-10-CM

## 2019-05-17 DIAGNOSIS — Z98.890 S/P MITRAL VALVE REPAIR: ICD-10-CM

## 2019-05-17 LAB — INR PPP: 3.1 (ref 2–3.5)

## 2019-05-17 PROCEDURE — 85610 PROTHROMBIN TIME: CPT | Performed by: FAMILY MEDICINE

## 2019-05-17 PROCEDURE — 99211 OFF/OP EST MAY X REQ PHY/QHP: CPT | Performed by: FAMILY MEDICINE

## 2019-05-17 NOTE — PROGRESS NOTES
Anticoagulation Summary  As of 5/17/2019    INR goal:   2.0-3.0   TTR:   74.9 % (3.8 y)   INR used for dosing:   3.10! (5/17/2019)   Warfarin maintenance plan:   3 mg (1 mg x 3) every Sun, Tue, Thu; 2 mg (1 mg x 2) all other days   Weekly warfarin total:   17 mg   Plan last modified:   Markell Stanford PharmD (7/27/2018)   Next INR check:   5/31/2019   Target end date:   Indefinite    Indications    CVA (cerebral vascular accident) (HCC) [I63.9]  S/P mitral valve repair [Z98.890]  Transient cerebral ischemia (Resolved) [G45.9]             Anticoagulation Episode Summary     [facility]   [facility]    Preferred lab:       Send INR reminders to:       Comments:   [place]      Anticoagulation Care Providers     Provider Role Specialty Phone number    Dudley aWrner M.D. Referring Cardiology [phone]    Ramiro YepezD Responsible          Anticoagulation Patient Findings  Patient Findings     Positives:   Change in diet/appetite    Negatives:   Signs/symptoms of thrombosis, Signs/symptoms of bleeding, Laboratory test error suspected, Change in health, Change in alcohol use, Change in activity, Upcoming invasive procedure, Emergency department visit, Upcoming dental procedure, Missed doses, Extra doses, Change in medications, Hospital admission, Bruising, Other complaints        HPI:   Roque Nguyen seen in clinic today, on anticoagulation therapy with warfarin for stroke prevention due to history of CVA and MVR.    Patient's previous INR was therapeutic at 2.0 on 5-3-19, at which time patient was instructed to continue with current warfarin regimen.  He returns to clinic today to recheck INR to ensure it is therapeutic and thus preventing possible clotting and/or bleeding/bruising complications.    CHADS-VASc = n/a  (unadjusted ischemic stroke risk/year:  n/a)    Does patient have any changes to current medical/health status since last appt (Y/N):  Diagnosed with GIST tumor, visiting  "GI oncologist at [facility] for management.  Does patient have any signs/symptoms of bleeding and/or thrombosis since the last appt (Y/N):  NO  Does patient have any interval changes to diet or medications since last appt (Y/N): had some severe N/V yesterday.  Are there any complications or cost restrictions with current therapy (Y/N):  NO      Vitals:  /72  HR 75    Weight  declines   Height   5' 7\"     Asssessment:      INR slightly supratherapeutic at 3.1, therefore increasing patient's bleeding risk.   Reason(s) for out of range INR today:  Likely due to vomiting and lack of appetite yesterday.      Plan:  Instructed patient to decrease today's dose to 1mg, then resume current warfarin regimen in order to bring INR to therapeutic range.     Follow up:  Because warfarin is a high risk medication and current CHEST guidelines recommend regular monitoring intervals (few days up to 12 weeks), will have patient return to clinic in 2 weeks to recheck INR.    Markell Stanford, PharmD    "

## 2019-05-21 ENCOUNTER — TELEPHONE (OUTPATIENT)
Dept: MEDICAL GROUP | Facility: PHYSICIAN GROUP | Age: 64
End: 2019-05-21

## 2019-05-21 NOTE — TELEPHONE ENCOUNTER
VOICEMAIL  1. Caller Name: Gabi-Wife                      Call Back Number: [phone] (home) [phone] (work)     2. Message: Did we give him a flu shot or not last fall    3. Patient approves office to leave a detailed voicemail/MyChart message: N\A

## 2019-05-22 ENCOUNTER — HOSPITAL ENCOUNTER (OUTPATIENT)
Dept: RADIOLOGY | Facility: MEDICAL CENTER | Age: 64
End: 2019-05-22
Attending: NURSE PRACTITIONER
Payer: COMMERCIAL

## 2019-05-22 ENCOUNTER — TELEPHONE (OUTPATIENT)
Dept: HEMATOLOGY ONCOLOGY | Facility: MEDICAL CENTER | Age: 64
End: 2019-05-22

## 2019-05-22 DIAGNOSIS — C80.1 CANCER (HCC): ICD-10-CM

## 2019-05-22 DIAGNOSIS — R19.06 EPIGASTRIC MASS: ICD-10-CM

## 2019-05-22 PROBLEM — C49.A0 GASTROINTESTINAL STROMAL TUMOR (GIST) (HCC): Status: ACTIVE | Noted: 2019-05-22

## 2019-05-22 PROCEDURE — 78815 PET IMAGE W/CT SKULL-THIGH: CPT

## 2019-05-22 NOTE — TELEPHONE ENCOUNTER
Patient was seen by Dr. Dupont with [facility] yesterday.  PET/CT results have been received and will forward over PET/CT results to his medical oncologist.  Will defer any further management to Dr. Dupont.    I did call patient to let him know the results of PET CT but had to leave a message. There was no evidence of metastasis.           Addendum: Patient and wife did contact me back.  I did let them know that the PET/CT shows no evidence of metastatic disease.  They were both very happy with the results.  They did confirm that they did see Dr. Dupont yesterday and they are due for further work-up and starting of chemotherapy.    No further follow-up with the [facility] is needed.

## 2019-05-23 ENCOUNTER — HOSPITAL ENCOUNTER (OUTPATIENT)
Dept: CARDIOLOGY | Facility: MEDICAL CENTER | Age: 64
End: 2019-05-23
Attending: INTERNAL MEDICINE
Payer: COMMERCIAL

## 2019-05-23 ENCOUNTER — TELEPHONE (OUTPATIENT)
Dept: NEUROLOGY | Facility: MEDICAL CENTER | Age: 64
End: 2019-05-23

## 2019-05-23 ENCOUNTER — HOSPITAL ENCOUNTER (OUTPATIENT)
Dept: LAB | Facility: MEDICAL CENTER | Age: 64
End: 2019-05-23
Attending: INTERNAL MEDICINE
Payer: COMMERCIAL

## 2019-05-23 DIAGNOSIS — C49.A9 GASTROINTESTINAL STROMAL TUMOR OF OTHER SITES (HCC): ICD-10-CM

## 2019-05-23 DIAGNOSIS — D68.61 PRIMARY ANTIPHOSPHOLIPID SYNDROME (HCC): ICD-10-CM

## 2019-05-23 LAB
ALBUMIN SERPL BCP-MCNC: 3.8 G/DL (ref 3.2–4.9)
ALBUMIN/GLOB SERPL: 1.4 G/DL
ALP SERPL-CCNC: 86 U/L (ref 30–99)
ALT SERPL-CCNC: 14 U/L (ref 2–50)
ANION GAP SERPL CALC-SCNC: 6 MMOL/L (ref 0–11.9)
AST SERPL-CCNC: 24 U/L (ref 12–45)
BASOPHILS # BLD AUTO: 0.3 % (ref 0–1.8)
BASOPHILS # BLD: 0.02 K/UL (ref 0–0.12)
BILIRUB SERPL-MCNC: 1 MG/DL (ref 0.1–1.5)
BUN SERPL-MCNC: 24 MG/DL (ref 8–22)
CALCIUM SERPL-MCNC: 9 MG/DL (ref 8.5–10.5)
CHLORIDE SERPL-SCNC: 101 MMOL/L (ref 96–112)
CO2 SERPL-SCNC: 26 MMOL/L (ref 20–33)
CREAT SERPL-MCNC: 1.17 MG/DL (ref 0.5–1.4)
EOSINOPHIL # BLD AUTO: 0.11 K/UL (ref 0–0.51)
EOSINOPHIL NFR BLD: 1.7 % (ref 0–6.9)
ERYTHROCYTE [DISTWIDTH] IN BLOOD BY AUTOMATED COUNT: 39.5 FL (ref 35.9–50)
GLOBULIN SER CALC-MCNC: 2.7 G/DL (ref 1.9–3.5)
GLUCOSE SERPL-MCNC: 129 MG/DL (ref 65–99)
HCT VFR BLD AUTO: 40.3 % (ref 42–52)
HGB BLD-MCNC: 13.2 G/DL (ref 14–18)
IMM GRANULOCYTES # BLD AUTO: 0.01 K/UL (ref 0–0.11)
IMM GRANULOCYTES NFR BLD AUTO: 0.2 % (ref 0–0.9)
LV EJECT FRACT  99904: 50
LV EJECT FRACT MOD 4C 99902: 49.73
LYMPHOCYTES # BLD AUTO: 0.95 K/UL (ref 1–4.8)
LYMPHOCYTES NFR BLD: 14.5 % (ref 22–41)
MCH RBC QN AUTO: 29.6 PG (ref 27–33)
MCHC RBC AUTO-ENTMCNC: 32.8 G/DL (ref 33.7–35.3)
MCV RBC AUTO: 90.4 FL (ref 81.4–97.8)
MONOCYTES # BLD AUTO: 0.36 K/UL (ref 0–0.85)
MONOCYTES NFR BLD AUTO: 5.5 % (ref 0–13.4)
NEUTROPHILS # BLD AUTO: 5.08 K/UL (ref 1.82–7.42)
NEUTROPHILS NFR BLD: 77.8 % (ref 44–72)
NRBC # BLD AUTO: 0 K/UL
NRBC BLD-RTO: 0 /100 WBC
PLATELET # BLD AUTO: 228 K/UL (ref 164–446)
PMV BLD AUTO: 11.1 FL (ref 9–12.9)
POTASSIUM SERPL-SCNC: 5.3 MMOL/L (ref 3.6–5.5)
PROT SERPL-MCNC: 6.5 G/DL (ref 6–8.2)
RBC # BLD AUTO: 4.46 M/UL (ref 4.7–6.1)
SODIUM SERPL-SCNC: 133 MMOL/L (ref 135–145)
WBC # BLD AUTO: 6.5 K/UL (ref 4.8–10.8)

## 2019-05-23 PROCEDURE — 93306 TTE W/DOPPLER COMPLETE: CPT

## 2019-05-23 PROCEDURE — 85025 COMPLETE CBC W/AUTO DIFF WBC: CPT

## 2019-05-23 PROCEDURE — 84443 ASSAY THYROID STIM HORMONE: CPT

## 2019-05-23 PROCEDURE — 36415 COLL VENOUS BLD VENIPUNCTURE: CPT

## 2019-05-23 PROCEDURE — 93306 TTE W/DOPPLER COMPLETE: CPT | Mod: 26 | Performed by: INTERNAL MEDICINE

## 2019-05-23 PROCEDURE — 80053 COMPREHEN METABOLIC PANEL: CPT

## 2019-05-23 NOTE — TELEPHONE ENCOUNTER
Patients wife called and stated that patiens oncologist is wondering if there is any interaction between the Vimpat and Gleevac (Taking 200 mg BID).     They are aware that the Keppra is safe but unsure about the Vimpat.    Please advise.

## 2019-05-28 ENCOUNTER — TELEPHONE (OUTPATIENT)
Dept: NEUROLOGY | Facility: MEDICAL CENTER | Age: 64
End: 2019-05-28

## 2019-05-28 NOTE — TELEPHONE ENCOUNTER
I have reached out to the science liason for Vimpat for an answer-- hopeful to have a response today.

## 2019-05-31 ENCOUNTER — ANTICOAGULATION VISIT (OUTPATIENT)
Dept: MEDICAL GROUP | Facility: PHYSICIAN GROUP | Age: 64
End: 2019-05-31
Payer: COMMERCIAL

## 2019-05-31 VITALS
HEART RATE: 69 BPM | RESPIRATION RATE: 18 BRPM | DIASTOLIC BLOOD PRESSURE: 64 MMHG | SYSTOLIC BLOOD PRESSURE: 107 MMHG | BODY MASS INDEX: 29.76 KG/M2 | WEIGHT: 190 LBS

## 2019-05-31 DIAGNOSIS — Z79.01 CHRONIC ANTICOAGULATION: ICD-10-CM

## 2019-05-31 DIAGNOSIS — Z98.890 S/P MITRAL VALVE REPAIR: ICD-10-CM

## 2019-05-31 LAB — INR PPP: 2.4 (ref 2–3.5)

## 2019-05-31 PROCEDURE — 93793 ANTICOAG MGMT PT WARFARIN: CPT | Performed by: INTERNAL MEDICINE

## 2019-05-31 PROCEDURE — 85610 PROTHROMBIN TIME: CPT | Performed by: INTERNAL MEDICINE

## 2019-05-31 NOTE — PROGRESS NOTES
Anticoagulation Summary  As of 2019    INR goal:   2.0-3.0   TTR:   75.0 % (3.8 y)   INR used for dosin.40 (2019)   Warfarin maintenance plan:   3 mg (1 mg x 3) every Sun, Tue, Thu; 2 mg (1 mg x 2) all other days   Weekly warfarin total:   17 mg   Plan last modified:   Ramiro ForteD (2018)   Next INR check:   2019   Target end date:   Indefinite    Indications    CVA (cerebral vascular accident) (HCC) [I63.9]  S/P mitral valve repair [Z98.890]  Transient cerebral ischemia (Resolved) [G45.9]             Anticoagulation Episode Summary     [facility]   [facility]    Preferred lab:       Send INR reminders to:       Comments:   [place]      Anticoagulation Care Providers     Provider Role Specialty Phone number    Dudley Warner M.D. Referring Cardiology [phone]    Ramiro YepezD Responsible          Anticoagulation Patient Findings      HPI:  Roque Nguyen seen in clinic today, on anticoagulation therapy with warfarin for CVA.  Reason for today's visit (per our collaborative practice policy) is because their last INR was 3.1 on . Intervention at the last visit:reduced warfarin dose.   Changes to current medical/health status since last appt: none  No signs/symptoms of bleeding and/or thrombosis since the last appt.    No interval changes to diet or any interval changes to medications since last appt.   No complications or cost restrictions with current therapy.   BP recorded in vitals.  Confirmed dosing regimen.     A/P   INR  therapeutic.   Pt is to continue with current warfarin dosing regimen.     Follow up appointment in 2 weeks to reduce risk of adverse events related to this high risk medication, Warfarin.    Purpose of next visit:    They have a TTR of 75% which is not at target (TTR target/goal is 100%) and requires close follow up to prevent a adverse event (the lower the TTR the higher risk of clots, strokes, or bleeding).      Other info:  Pt educated to contact our clinic with any changes in medications or s/s of bleeding or thrombosis  CHEST guidelines recommend frequent INR monitoring at regular intervals (a few days up to a max of 12 weeks) to ensure they are on the proper dose of warfarin and not having any complications from therapy. INRs can dramatically change over a short time period due to diet, medications, and medical conditions.     Elvin Frank, PharmD     6/5/19:    Pt's wife states pt will be starting Gleevec on 6/10 or later.  Will test INR on 6/14 to monitor for DDI.    Elvin Frank, PharmD

## 2019-06-10 ENCOUNTER — TELEPHONE (OUTPATIENT)
Dept: NEUROLOGY | Facility: MEDICAL CENTER | Age: 64
End: 2019-06-10

## 2019-06-10 NOTE — TELEPHONE ENCOUNTER
Patient's wife called and stated that they needed existing rx for VIMPAT moved from [facility] to [facility]. Rx was called in for #180 with one refill.     Pharmacist said since it is controlled only 1 refill could be authorized as it was for a 6 month supply.       Patient said they would be running short of medication before the mail supply would be in, advised patient that one week samples would be available to be picked up at .

## 2019-06-14 ENCOUNTER — ANTICOAGULATION VISIT (OUTPATIENT)
Dept: MEDICAL GROUP | Facility: PHYSICIAN GROUP | Age: 64
End: 2019-06-14
Payer: COMMERCIAL

## 2019-06-14 VITALS
BODY MASS INDEX: 29.29 KG/M2 | WEIGHT: 187 LBS | DIASTOLIC BLOOD PRESSURE: 61 MMHG | RESPIRATION RATE: 20 BRPM | SYSTOLIC BLOOD PRESSURE: 94 MMHG | HEART RATE: 81 BPM

## 2019-06-14 DIAGNOSIS — Z98.890 S/P MITRAL VALVE REPAIR: ICD-10-CM

## 2019-06-14 LAB — INR PPP: 1.9 (ref 2–3.5)

## 2019-06-14 PROCEDURE — 85610 PROTHROMBIN TIME: CPT | Performed by: FAMILY MEDICINE

## 2019-06-14 PROCEDURE — 99211 OFF/OP EST MAY X REQ PHY/QHP: CPT | Performed by: FAMILY MEDICINE

## 2019-06-14 NOTE — PROGRESS NOTES
Anticoagulation Summary  As of 2019    INR goal:   2.0-3.0   TTR:   75.1 % (3.8 y)   INR used for dosin.90! (2019)   Warfarin maintenance plan:   3 mg (1 mg x 3) every Sun, Tue, u; 2 mg (1 mg x 2) all other days   Weekly warfarin total:   17 mg   Plan last modified:   Markell Stanford PharmD (2018)   Next INR check:   2019   Target end date:   Indefinite    Indications    CVA (cerebral vascular accident) (HCC) [I63.9]  S/P mitral valve repair [Z98.890]  Transient cerebral ischemia (Resolved) [G45.9]             Anticoagulation Episode Summary     [facility]   [facility]    Preferred lab:       Send INR reminders to:       Comments:   [place]      Anticoagulation Care Providers     Provider Role Specialty Phone number    Dudley Warner M.D. Referring Cardiology [phone]    Ramiro YepezD Responsible          Anticoagulation Patient Findings      HPI:  Roque Nguyen seen in clinic today, on anticoagulation therapy with warfarin for CVA  Reason for today's visit (per our collaborative practice policy) is because their last INR was 2.4 on 19. Intervention at the last visit:none, but pt has new medication DDI with warfarin   Changes to current medical/health status since last appt: none  No signs/symptoms of bleeding and/or thrombosis since the last appt.    Started Gleevec only 48 hours ago, need to trend for DDI.   BP recorded in vitals.  Confirmed dosing regimen.     A/P   INR  SUB-therapeutic.   Possible reason(s) INR is not in range today: unknown  Given new DDI, no bolus dose given or maintenance dose adjustments.     Follow up appointment in 1 weeks to reduce risk of adverse events related to this high risk medication, Warfarin.    Purpose of next visit:  They are at a increased risk of clots because INR is below goal.    Other info:  Pt educated to contact our clinic with any changes in medications or s/s of bleeding or thrombosis  CHEST  guidelines recommend frequent INR monitoring at regular intervals (a few days up to a max of 12 weeks) to ensure they are on the proper dose of warfarin and not having any complications from therapy. INRs can dramatically change over a short time period due to diet, medications, and medical conditions.     Elvin Frank, PharmD

## 2019-06-19 ENCOUNTER — HOSPITAL ENCOUNTER (OUTPATIENT)
Dept: LAB | Facility: MEDICAL CENTER | Age: 64
End: 2019-06-19
Attending: INTERNAL MEDICINE
Payer: COMMERCIAL

## 2019-06-19 LAB
ALBUMIN SERPL BCP-MCNC: 3.5 G/DL (ref 3.2–4.9)
ALBUMIN/GLOB SERPL: 1.8 G/DL
ALP SERPL-CCNC: 83 U/L (ref 30–99)
ALT SERPL-CCNC: 12 U/L (ref 2–50)
ANION GAP SERPL CALC-SCNC: 6 MMOL/L (ref 0–11.9)
AST SERPL-CCNC: 21 U/L (ref 12–45)
BILIRUB SERPL-MCNC: 0.9 MG/DL (ref 0.1–1.5)
BUN SERPL-MCNC: 19 MG/DL (ref 8–22)
CALCIUM SERPL-MCNC: 8.3 MG/DL (ref 8.5–10.5)
CHLORIDE SERPL-SCNC: 104 MMOL/L (ref 96–112)
CO2 SERPL-SCNC: 23 MMOL/L (ref 20–33)
CREAT SERPL-MCNC: 1.63 MG/DL (ref 0.5–1.4)
GLOBULIN SER CALC-MCNC: 2 G/DL (ref 1.9–3.5)
GLUCOSE SERPL-MCNC: 108 MG/DL (ref 65–99)
POTASSIUM SERPL-SCNC: 5.5 MMOL/L (ref 3.6–5.5)
PROT SERPL-MCNC: 5.5 G/DL (ref 6–8.2)
SODIUM SERPL-SCNC: 133 MMOL/L (ref 135–145)

## 2019-06-19 PROCEDURE — 80053 COMPREHEN METABOLIC PANEL: CPT

## 2019-06-21 ENCOUNTER — ANTICOAGULATION VISIT (OUTPATIENT)
Dept: MEDICAL GROUP | Facility: PHYSICIAN GROUP | Age: 64
End: 2019-06-21
Payer: COMMERCIAL

## 2019-06-21 VITALS — HEART RATE: 71 BPM | DIASTOLIC BLOOD PRESSURE: 67 MMHG | SYSTOLIC BLOOD PRESSURE: 111 MMHG

## 2019-06-21 DIAGNOSIS — Z98.890 S/P MITRAL VALVE REPAIR: ICD-10-CM

## 2019-06-21 DIAGNOSIS — Z79.01 CHRONIC ANTICOAGULATION: Chronic | ICD-10-CM

## 2019-06-21 LAB — INR PPP: 2.1 (ref 2–3.5)

## 2019-06-21 PROCEDURE — 85610 PROTHROMBIN TIME: CPT | Performed by: FAMILY MEDICINE

## 2019-06-21 PROCEDURE — 93793 ANTICOAG MGMT PT WARFARIN: CPT | Performed by: FAMILY MEDICINE

## 2019-06-21 NOTE — PROGRESS NOTES
Anticoagulation Summary  As of 2019    INR goal:   2.0-3.0   TTR:   75.0 % (3.8 y)   INR used for dosin.10 (2019)   Warfarin maintenance plan:   3 mg (1 mg x 3) every Sun, Tue, Thu; 2 mg (1 mg x 2) all other days   Weekly warfarin total:   17 mg   Plan last modified:   Ramiro ForteD (2018)   Next INR check:   2019   Target end date:   Indefinite    Indications    CVA (cerebral vascular accident) (HCC) [I63.9]  S/P mitral valve repair [Z98.890]  Transient cerebral ischemia (Resolved) [G45.9]             Anticoagulation Episode Summary     [facility]   [facility]    Preferred lab:       Send INR reminders to:       Comments:   [place]      Anticoagulation Care Providers     Provider Role Specialty Phone number    Dudley Warner M.D. Referring Cardiology [phone]    Ramiro YepezD Responsible          Anticoagulation Patient Findings  Patient Findings     Negatives:   Signs/symptoms of thrombosis, Signs/symptoms of bleeding, Laboratory test error suspected, Change in health, Change in alcohol use, Change in activity, Upcoming invasive procedure, Emergency department visit, Upcoming dental procedure, Missed doses, Extra doses, Change in medications, Change in diet/appetite, Hospital admission, Bruising, Other complaints        HPI:   Roque Krishnaler seen in clinic today, on anticoagulation therapy with warfarin for stroke prevention due to history of CVA, TIA, and mitral valve repair    Patient's previous INR was subtherapeutic at 1.9 on 19, at which time patient was instructed to continue with current warfarin regimen due to DDI with Gleevac.  He returns to clinic today to recheck INR to ensure it is therapeutic and thus preventing possible clotting and/or bleeding/bruising complications.    CHADS-VASc = n/a  (unadjusted ischemic stroke risk/year:  n/a)    Does patient have any changes to current medical/health status since last appt (Y/N):   "NO  Does patient have any signs/symptoms of bleeding and/or thrombosis since the last appt (Y/N):  NO  Does patient have any interval changes to diet or medications since last appt (Y/N):  Gleevac for ~two weeks now  Are there any complications or cost restrictions with current therapy (Y/N):  NO      Vitals:  /67  HR 71    Weight  declines   Height   5' 7\"     Asssessment:      INR therapeutic at 2.1, therefore decreasing patient's risk of stroke and/or bleeding complications.   Reason(s) for out of range INR today:  n/a      Plan:  No apparent DDI with Gleevac at this point, therefore, will have patient continue with current warfarin regimen in order to maintain INR in therapeutic range.     Follow up:  Because warfarin is a high risk medication and current CHEST guidelines recommend regular monitoring intervals (few days up to 12 weeks), will have patient return to clinic in 2 weeks to recheck INR.    Markell Stanford, PharmD    "

## 2019-07-08 ENCOUNTER — ANTICOAGULATION VISIT (OUTPATIENT)
Dept: MEDICAL GROUP | Facility: PHYSICIAN GROUP | Age: 64
End: 2019-07-08
Payer: COMMERCIAL

## 2019-07-08 VITALS — DIASTOLIC BLOOD PRESSURE: 65 MMHG | HEART RATE: 83 BPM | SYSTOLIC BLOOD PRESSURE: 97 MMHG

## 2019-07-08 DIAGNOSIS — Z98.890 S/P MITRAL VALVE REPAIR: ICD-10-CM

## 2019-07-08 LAB — INR PPP: 3.3 (ref 2–3.5)

## 2019-07-08 PROCEDURE — 99211 OFF/OP EST MAY X REQ PHY/QHP: CPT | Performed by: INTERNAL MEDICINE

## 2019-07-08 PROCEDURE — 85610 PROTHROMBIN TIME: CPT | Performed by: INTERNAL MEDICINE

## 2019-07-08 RX ORDER — IMATINIB MESYLATE 100 MG/1
100 TABLET, FILM COATED ORAL DAILY
COMMUNITY
End: 2019-07-22

## 2019-07-08 NOTE — PROGRESS NOTES
Anticoagulation Summary  As of 7/8/2019    INR goal:   2.0-3.0   TTR:   75.0 % (3.9 y)   INR used for dosing:   3.30! (7/8/2019)   Warfarin maintenance plan:   3 mg (1 mg x 3) every Sun, Tue, Thu; 2 mg (1 mg x 2) all other days   Weekly warfarin total:   17 mg   Plan last modified:   Ramiro ForteD (7/27/2018)   Next INR check:   7/15/2019   Target end date:   Indefinite    Indications    CVA (cerebral vascular accident) (HCC) [I63.9]  S/P mitral valve repair [Z98.890]  Transient cerebral ischemia (Resolved) [G45.9]             Anticoagulation Episode Summary     [facility]    Preferred lab:       Send INR reminders to:       Comments:   [place]      Anticoagulation Care Providers     Provider Role Specialty Phone number    Dudley Warner M.D. Referring Cardiology [phone]    Ramiro YepezD Responsible          Anticoagulation Patient Findings  Patient Findings     Positives:   Change in medications    Negatives:   Signs/symptoms of thrombosis, Signs/symptoms of bleeding, Laboratory test error suspected, Change in health, Change in alcohol use, Change in activity, Upcoming invasive procedure, Emergency department visit, Upcoming dental procedure, Missed doses, Extra doses, Change in diet/appetite, Hospital admission, Bruising, Other complaints              HPI:   Roque Nguyen was seen in clinic today, on anticoagulation therapy with warfarin for stroke prevention due to history of CVA and mitral valve repair.    Patient's previous INR was therapeutic at 2.10 on 6-21-19, at which time patient was instructed to Pt is to continue with current warfarin dosing regimen.  He returns to clinic today to recheck INR to ensure it is therapeutic and thus preventing possible clotting and/or bleeding/bruising complications.    CHADS-VASc = n/a  (unadjusted ischemic stroke risk/year:  n/a, which is n/a)    Does patient have any changes to current medical/health status  since last appt (Y/N):  Patient has started Gleevec recently for tumor  Does patient have any signs/symptoms of bleeding and/or thrombosis since the last appt (Y/N):  N  Does patient have any interval changes to diet or medications since last appt (Y/N):  N  Are there any complications or cost restrictions with current therapy (Y/N):  N     Does patient have [facility] PCP? Yes (If not, please document discussion that patient must be seen at [facility])       Vitals:  BP 97/65  HR 83    Weight  declined   Height   declined     Asssessment:      INR supratherapeutic at 3.30, therefore at increased risk of bleeding.   Reason(s) for out of range INR today:  Possibly due to DDI with Gleevec      Plan:  Hold today's dose, then Pt is to continue with current warfarin dosing regimen.       Follow up:  Because warfarin is a high risk medication and current CHEST guidelines recommend regular monitoring intervals (few days up to 12 weeks), will have patient return to clinic in 1 weeks to recheck INR.    Adria Linda, Pharmacy Intern  I have reviewed and am in agreement with the above stated plan on 7-8-19.  Makrell Stanford, PharmD

## 2019-07-15 ENCOUNTER — ANTICOAGULATION VISIT (OUTPATIENT)
Dept: MEDICAL GROUP | Facility: PHYSICIAN GROUP | Age: 64
End: 2019-07-15
Payer: COMMERCIAL

## 2019-07-15 VITALS — DIASTOLIC BLOOD PRESSURE: 73 MMHG | SYSTOLIC BLOOD PRESSURE: 93 MMHG | HEART RATE: 88 BPM

## 2019-07-15 DIAGNOSIS — Z98.890 S/P MITRAL VALVE REPAIR: ICD-10-CM

## 2019-07-15 LAB — INR PPP: 2.9 (ref 2–3.5)

## 2019-07-15 PROCEDURE — 93793 ANTICOAG MGMT PT WARFARIN: CPT | Performed by: INTERNAL MEDICINE

## 2019-07-15 PROCEDURE — 85610 PROTHROMBIN TIME: CPT | Performed by: INTERNAL MEDICINE

## 2019-07-15 NOTE — PROGRESS NOTES
Anticoagulation Summary  As of 7/15/2019    INR goal:   2.0-3.0   TTR:   74.7 % (3.9 y)   INR used for dosin.90 (7/15/2019)   Warfarin maintenance plan:   3 mg (1 mg x 3) every Sun, Tue, Thu; 2 mg (1 mg x 2) all other days   Weekly warfarin total:   17 mg   Plan last modified:   Ramiro ForteD (2018)   Next INR check:   2019   Target end date:   Indefinite    Indications    CVA (cerebral vascular accident) (HCC) [I63.9]  S/P mitral valve repair [Z98.890]  Transient cerebral ischemia (Resolved) [G45.9]             Anticoagulation Episode Summary     [facility]    Preferred lab:       Send INR reminders to:       Comments:   [place]      Anticoagulation Care Providers     Provider Role Specialty Phone number    Dudley Warner M.D. Referring Cardiology [phone]    Ramiro YepezD Responsible          Patient Findings     Positives:   Change in medications    Negatives:   Signs/symptoms of thrombosis, Signs/symptoms of bleeding, Laboratory test error suspected, Change in health, Change in alcohol use, Change in activity, Upcoming invasive procedure, Emergency department visit, Upcoming dental procedure, Missed doses, Extra doses, Change in diet/appetite, Hospital admission, Bruising, Other complaints                HPI:   Roque Nguyen seen in clinic today, on anticoagulation therapy with warfarin for stroke prevention due to history of CVA, TIA, and mitral valve repair.    Patient's previous INR was supratherapeutic at 3.30 on 19, at which time patient was instructed to hold one dose then continue current regimen.  He returns to clinic today to recheck INR to ensure it is therapeutic and thus preventing possible clotting and/or bleeding/bruising complications.    CHADS-VASc = n/a  (unadjusted ischemic stroke risk/year:  N/a, which is n/a)    Does patient have any changes to current medical/health status since last appt (Y/N):  Y, patient  reports some side effects from Gleevec, has held this medication for the last week, will see oncology today about this.  Does patient have any signs/symptoms of bleeding and/or thrombosis since the last appt (Y/N):  n  Does patient have any interval changes to diet or medications since last appt (Y/N):  n  Are there any complications or cost restrictions with current therapy (Y/N):  n     Does patient have [facility] PCP? yes (If not, please document discussion that patient must be seen at [facility])       Vitals:  BP 93/73  HR 88    Weight  declines   Height   declines     Asssessment:      INR therapeutic at 2.90, therefore decreasing patient's risk of stroke.   Reason(s) for out of range INR today:  n/a      Plan:  Pt is to decrease Tuesday dose to 2 mg once, then continue with current warfarin dosing regimen to maintain INR within therapeutic range.     Follow up:  Because warfarin is a high risk medication and current CHEST guidelines recommend regular monitoring intervals (few days up to 12 weeks), will have patient return to clinic in 1 weeks to recheck INR.    Adria Linda, Pharmacy Intern  I have reviewed and am in agreement with the above stated plan on 7-15-19.  Markell Stanford, PharmD

## 2019-07-17 ENCOUNTER — HOSPITAL ENCOUNTER (OUTPATIENT)
Dept: LAB | Facility: MEDICAL CENTER | Age: 64
End: 2019-07-17
Attending: NURSE PRACTITIONER
Payer: COMMERCIAL

## 2019-07-17 LAB
ALBUMIN SERPL BCP-MCNC: 3.2 G/DL (ref 3.2–4.9)
ALBUMIN/GLOB SERPL: 1.5 G/DL
ALP SERPL-CCNC: 79 U/L (ref 30–99)
ALT SERPL-CCNC: 39 U/L (ref 2–50)
AMBIGUOUS DTTM AMBI4: NORMAL
ANION GAP SERPL CALC-SCNC: 9 MMOL/L (ref 0–11.9)
AST SERPL-CCNC: 36 U/L (ref 12–45)
BILIRUB SERPL-MCNC: 1.4 MG/DL (ref 0.1–1.5)
BUN SERPL-MCNC: 14 MG/DL (ref 8–22)
CALCIUM SERPL-MCNC: 8.3 MG/DL (ref 8.5–10.5)
CHLORIDE SERPL-SCNC: 104 MMOL/L (ref 96–112)
CO2 SERPL-SCNC: 24 MMOL/L (ref 20–33)
CREAT SERPL-MCNC: 1.16 MG/DL (ref 0.5–1.4)
GLOBULIN SER CALC-MCNC: 2.1 G/DL (ref 1.9–3.5)
GLUCOSE SERPL-MCNC: 90 MG/DL (ref 65–99)
MAGNESIUM SERPL-MCNC: 1.6 MG/DL (ref 1.5–2.5)
PHOSPHATE SERPL-MCNC: 3.3 MG/DL (ref 2.5–4.5)
POTASSIUM SERPL-SCNC: 4.5 MMOL/L (ref 3.6–5.5)
PROT SERPL-MCNC: 5.3 G/DL (ref 6–8.2)
SODIUM SERPL-SCNC: 137 MMOL/L (ref 135–145)
TSH SERPL DL<=0.005 MIU/L-ACNC: 1.28 UIU/ML (ref 0.38–5.33)

## 2019-07-17 PROCEDURE — 84100 ASSAY OF PHOSPHORUS: CPT

## 2019-07-17 PROCEDURE — 80053 COMPREHEN METABOLIC PANEL: CPT

## 2019-07-17 PROCEDURE — 83735 ASSAY OF MAGNESIUM: CPT

## 2019-07-17 PROCEDURE — 84443 ASSAY THYROID STIM HORMONE: CPT

## 2019-07-22 ENCOUNTER — ANTICOAGULATION VISIT (OUTPATIENT)
Dept: MEDICAL GROUP | Facility: PHYSICIAN GROUP | Age: 64
End: 2019-07-22
Payer: COMMERCIAL

## 2019-07-22 VITALS — DIASTOLIC BLOOD PRESSURE: 77 MMHG | HEART RATE: 90 BPM | SYSTOLIC BLOOD PRESSURE: 116 MMHG

## 2019-07-22 DIAGNOSIS — Z98.890 S/P MITRAL VALVE REPAIR: ICD-10-CM

## 2019-07-22 LAB — INR PPP: 3.8 (ref 2–3.5)

## 2019-07-22 PROCEDURE — 99211 OFF/OP EST MAY X REQ PHY/QHP: CPT | Performed by: INTERNAL MEDICINE

## 2019-07-22 PROCEDURE — 85610 PROTHROMBIN TIME: CPT | Performed by: FAMILY MEDICINE

## 2019-07-22 NOTE — PROGRESS NOTES
Anticoagulation Summary  As of 7/22/2019    INR goal:   2.0-3.0   TTR:   74.4 % (3.9 y)   INR used for dosing:   3.80! (7/22/2019)   Warfarin maintenance plan:   3 mg (1 mg x 3) every Sun, Tue, Thu; 2 mg (1 mg x 2) all other days   Weekly warfarin total:   17 mg   Plan last modified:   Markell Stanford PharmD (7/27/2018)   Next INR check:   7/29/2019   Target end date:   Indefinite    Indications    CVA (cerebral vascular accident) (HCC) [I63.9]  S/P mitral valve repair [Z98.890]  Transient cerebral ischemia (Resolved) [G45.9]             Anticoagulation Episode Summary     [facility]   [facility]    Preferred lab:       Send INR reminders to:       Comments:   [place]      Anticoagulation Care Providers     Provider Role Specialty Phone number    Dudley Warner M.D. Referring Cardiology [phone]    Ramiro YepezD Responsible          Anticoagulation Patient Findings  Patient Findings     Positives:   Change in medications    Negatives:   Signs/symptoms of thrombosis, Signs/symptoms of bleeding, Laboratory test error suspected, Change in health, Change in alcohol use, Change in activity, Upcoming invasive procedure, Emergency department visit, Upcoming dental procedure, Missed doses, Extra doses, Change in diet/appetite, Hospital admission, Bruising, Other complaints              HPI:   Roque Krishnaler seen in clinic today, on anticoagulation therapy with warfarin for stroke prevention due to history of CVA, TIA, and mitral valve repair    Patient's previous INR was therapeutic at 2.90 on 7-15-19, at which time patient was instructed to reduce weekly regimen once.  He returns to clinic today to recheck INR to ensure it is therapeutic and thus preventing possible clotting and/or bleeding/bruising complications.    CHADS-VASc = n/a  (unadjusted ischemic stroke risk/year:  n/a, which is n/a)    Does patient have any changes to current medical/health status since last appt (Y/N):  Y,  patient recently discontinued taking Gleevec due to tolerability.  Does patient have any signs/symptoms of bleeding and/or thrombosis since the last appt (Y/N):  N  Does patient have any interval changes to diet or medications since last appt (Y/N):  Y, patient recently drank cranberry juice within the last week which likely contributed to his increased INR reading today.  Are there any complications or cost restrictions with current therapy (Y/N):  N     Does patient have [facility] PCP? Yes (If not, please document discussion that patient must be seen at [facility])       Vitals:  /77  HR 90    Weight  declined   Height   declined     Asssessment:      INR supratherapeutic at 3.80, therefore increasing risk for bleeding.   Reason(s) for out of range INR today:  Patient has been drinking cranberry juice in the last week, which likely contributed to increased INR      Plan:  Patient instructed to hold warfarin once, and reduce weekly regimen once.      Follow up:  Because warfarin is a high risk medication and current CHEST guidelines recommend regular monitoring intervals (few days up to 12 weeks), will have patient return to clinic in 1 weeks to recheck INR.    Adria Linda, Pharmacy Intern  I have reviewed and am in agreement with the above stated plan on 7-22-19.  Markell Stanford, PharmD

## 2019-07-29 ENCOUNTER — ANTICOAGULATION VISIT (OUTPATIENT)
Dept: MEDICAL GROUP | Facility: PHYSICIAN GROUP | Age: 64
End: 2019-07-29
Payer: COMMERCIAL

## 2019-07-29 VITALS — SYSTOLIC BLOOD PRESSURE: 104 MMHG | HEART RATE: 91 BPM | DIASTOLIC BLOOD PRESSURE: 62 MMHG

## 2019-07-29 DIAGNOSIS — I63.9 CEREBROVASCULAR ACCIDENT (CVA), UNSPECIFIED MECHANISM (HCC): ICD-10-CM

## 2019-07-29 DIAGNOSIS — Z98.890 S/P MITRAL VALVE REPAIR: ICD-10-CM

## 2019-07-29 LAB — INR PPP: 5.9 (ref 2–3.5)

## 2019-07-29 PROCEDURE — 99211 OFF/OP EST MAY X REQ PHY/QHP: CPT | Performed by: INTERNAL MEDICINE

## 2019-07-29 PROCEDURE — 85610 PROTHROMBIN TIME: CPT | Performed by: INTERNAL MEDICINE

## 2019-07-29 NOTE — PROGRESS NOTES
Anticoagulation Summary  As of 2019    INR goal:   2.0-3.0   TTR:   74.0 % (4 y)   INR used for dosin.90! (2019)   Warfarin maintenance plan:   1 mg (1 mg x 1) every Mon, Fri; 2 mg (1 mg x 2) all other days   Weekly warfarin total:   12 mg   Plan last modified:   Markell Stanford, PharmD (2019)   Next INR check:   2019   Target end date:   Indefinite    Indications    CVA (cerebral vascular accident) (HCC) [I63.9]  S/P mitral valve repair [Z98.890]  Transient cerebral ischemia (Resolved) [G45.9]             Anticoagulation Episode Summary     [facility]    Preferred lab:       Send INR reminders to:       Comments:   [place]      Anticoagulation Care Providers     Provider Role Specialty Phone number    Dudley Warner M.D. Referring Cardiology [phone]    Ramiro YepezD Responsible          Anticoagulation Patient Findings  Patient Findings     Positives:   Change in diet/appetite    Negatives:   Signs/symptoms of thrombosis, Signs/symptoms of bleeding, Laboratory test error suspected, Change in health, Change in alcohol use, Change in activity, Upcoming invasive procedure, Emergency department visit, Upcoming dental procedure, Missed doses, Extra doses, Change in medications, Hospital admission, Bruising, Other complaints              HPI:   Roque Krishnaler seen in clinic today, on anticoagulation therapy with warfarin for stroke prevention due to history of CVA    Patient's previous INR was supratherapeutic at 3.80 on 19, at which time patient was instructed to hold dose once, and then continue current regimen.  He returns to clinic today to recheck INR to ensure it is therapeutic and thus preventing possible clotting and/or bleeding/bruising complications.    CHADS-VASc = n/a  (unadjusted ischemic stroke risk/year:  n/a, which is n/a)    Does patient have any changes to current medical/health status since last appt (Y/N):  N   Does patient  have any signs/symptoms of bleeding and/or thrombosis since the last appt (Y/N):  N  Does patient have any interval changes to diet or medications since last appt (Y/N):  Y, patient states that he has not had much of an appetite at all recently, and has lost weight without trying.  Are there any complications or cost restrictions with current therapy (Y/N):  N     Does patient have [facility] PCP? Yes (If not, please document discussion that patient must be seen at [facility])       Vitals:  /62  HR 91    Weight  178 lbs.   Height   declined     Asssessment:      INR supratherapeutic at 5.90, therefore at increased risk for bleeding   Reason(s) for out of range INR today:  Patient has not had much of an appetite and has been losing weight without trying.      Plan:  Patient instructed to HOLD warfarin, and then decrease weekly regimen by ~29% as detailed above.     Follow up:  Because warfarin is a high risk medication and current CHEST guidelines recommend regular monitoring intervals (few days up to 12 weeks), will have patient return to clinic in 4 days to recheck INR.    Adria Linda, Pharmacy Intern  I have reviewed and am in agreement with the above stated plan on 7-29-19.  Markell Stanford, PharmD

## 2019-08-02 ENCOUNTER — ANTICOAGULATION VISIT (OUTPATIENT)
Dept: MEDICAL GROUP | Facility: PHYSICIAN GROUP | Age: 64
End: 2019-08-02
Payer: COMMERCIAL

## 2019-08-02 DIAGNOSIS — Z98.890 S/P MITRAL VALVE REPAIR: ICD-10-CM

## 2019-08-02 LAB — INR PPP: 3.2 (ref 2–3.5)

## 2019-08-02 PROCEDURE — 99211 OFF/OP EST MAY X REQ PHY/QHP: CPT | Performed by: INTERNAL MEDICINE

## 2019-08-02 PROCEDURE — 85610 PROTHROMBIN TIME: CPT | Performed by: INTERNAL MEDICINE

## 2019-08-05 ENCOUNTER — ANTICOAGULATION VISIT (OUTPATIENT)
Dept: MEDICAL GROUP | Facility: PHYSICIAN GROUP | Age: 64
End: 2019-08-05
Payer: COMMERCIAL

## 2019-08-05 VITALS — DIASTOLIC BLOOD PRESSURE: 67 MMHG | SYSTOLIC BLOOD PRESSURE: 101 MMHG | HEART RATE: 88 BPM

## 2019-08-05 DIAGNOSIS — Z98.890 S/P MITRAL VALVE REPAIR: ICD-10-CM

## 2019-08-05 DIAGNOSIS — I63.9 CEREBROVASCULAR ACCIDENT (CVA), UNSPECIFIED MECHANISM (HCC): ICD-10-CM

## 2019-08-05 LAB — INR PPP: 2.1 (ref 2–3.5)

## 2019-08-05 PROCEDURE — 93793 ANTICOAG MGMT PT WARFARIN: CPT | Performed by: INTERNAL MEDICINE

## 2019-08-05 PROCEDURE — 85610 PROTHROMBIN TIME: CPT | Performed by: INTERNAL MEDICINE

## 2019-08-05 NOTE — PROGRESS NOTES
Anticoagulation Summary  As of 2019    INR goal:   2.0-3.0   TTR:   73.8 % (4 y)   INR used for dosin.10 (2019)   Warfarin maintenance plan:   1 mg (1 mg x 1) every day   Weekly warfarin total:   7 mg   Plan last modified:   Markell Stanford, PharmD (2019)   Next INR check:   2019   Target end date:   Indefinite    Indications    CVA (cerebral vascular accident) (HCC) [I63.9]  S/P mitral valve repair [Z98.890]  Transient cerebral ischemia (Resolved) [G45.9]             Anticoagulation Episode Summary     [facility]   [facility]    Preferred lab:       Send INR reminders to:       Comments:   [place]      Anticoagulation Care Providers     Provider Role Specialty Phone number    Dudley Warner M.D. Referring Cardiology [phone]    Clau Carrera PharmD Responsible          Anticoagulation Patient Findings  Patient Findings     Positives:   Change in medications    Negatives:   Signs/symptoms of thrombosis, Signs/symptoms of bleeding, Laboratory test error suspected, Change in health, Change in alcohol use, Change in activity, Upcoming invasive procedure, Emergency department visit, Upcoming dental procedure, Missed doses, Extra doses, Change in diet/appetite, Hospital admission, Bruising, Other complaints    Comments:   Will be starting Sutent        HPI:   Roque Kapoorehler seen in clinic today, on anticoagulation therapy with warfarin for stroke prevention due to history of CVA, TIA, and mitral valve repair.    Patient's previous INR was supratherapeutic at 3.2 on 19, at which time patient was instructed to hold one dose, then decrease weekly warfarin regimen.  He returns to clinic today to recheck INR to ensure it is therapeutic and thus preventing possible clotting and/or bleeding/bruising complications.    CHADS-VASc = n/a  (unadjusted ischemic stroke risk/year:  n/a)    Does patient have any changes to current medical/health status since last appt (Y/N):   "NO  Does patient have any signs/symptoms of bleeding and/or thrombosis since the last appt (Y/N):  NO  Does patient have any interval changes to diet or medications since last appt (Y/N):  Will be starting Sutent as oral chemo agent this afternoon.  Are there any complications or cost restrictions with current therapy (Y/N):  NO     Does patient have [facility] PCP? YES, Dr esposito (If not, please document discussion that patient must be seen at [facility])       Vitals:  /67  HR 88    Weight  declines   Height   5' 7\"     Asssessment:      INR therapeutic at 2.1, therefore decreasing patient's risk of stroke and/or bleeding complications.   Reason(s) for out of range INR today:  n/a      Plan:  Based on dosing from the past seven to 10 days, will have patient decrease weekly warfarin regimen to 1mg daily in order to maintain INR in therapeutic range.     Follow up:  Because warfarin is a high risk medication and current CHEST guidelines recommend regular monitoring intervals (few days up to 12 weeks), will have patient return to clinic in 1 weeks to recheck INR.    Markell Stanford, PharmD, BCACP    "

## 2019-08-07 ENCOUNTER — APPOINTMENT (OUTPATIENT)
Dept: RADIOLOGY | Facility: MEDICAL CENTER | Age: 64
DRG: 871 | End: 2019-08-07
Attending: EMERGENCY MEDICINE
Payer: MEDICARE

## 2019-08-07 ENCOUNTER — HOSPITAL ENCOUNTER (INPATIENT)
Facility: MEDICAL CENTER | Age: 64
LOS: 3 days | DRG: 871 | End: 2019-08-10
Attending: EMERGENCY MEDICINE | Admitting: INTERNAL MEDICINE
Payer: MEDICARE

## 2019-08-07 DIAGNOSIS — I50.9 ACUTE CONGESTIVE HEART FAILURE, UNSPECIFIED HEART FAILURE TYPE (HCC): ICD-10-CM

## 2019-08-07 DIAGNOSIS — J96.01 ACUTE RESPIRATORY FAILURE WITH HYPOXIA (HCC): ICD-10-CM

## 2019-08-07 DIAGNOSIS — Z79.01 CHRONIC ANTICOAGULATION: Chronic | ICD-10-CM

## 2019-08-07 DIAGNOSIS — E87.20 LACTIC ACIDOSIS: ICD-10-CM

## 2019-08-07 DIAGNOSIS — E86.0 DEHYDRATION: ICD-10-CM

## 2019-08-07 DIAGNOSIS — Z98.890 S/P MITRAL VALVE REPAIR: Chronic | ICD-10-CM

## 2019-08-07 DIAGNOSIS — I44.7 LBBB (LEFT BUNDLE BRANCH BLOCK): ICD-10-CM

## 2019-08-07 DIAGNOSIS — D68.61 ANTIPHOSPHOLIPID SYNDROME (HCC): ICD-10-CM

## 2019-08-07 DIAGNOSIS — R10.9 ACUTE ABDOMINAL PAIN: ICD-10-CM

## 2019-08-07 DIAGNOSIS — J18.9 COMMUNITY ACQUIRED PNEUMONIA, UNSPECIFIED LATERALITY: ICD-10-CM

## 2019-08-07 DIAGNOSIS — C49.A2 MALIGNANT GASTROINTESTINAL STROMAL TUMOR (GIST) OF STOMACH (HCC): ICD-10-CM

## 2019-08-07 DIAGNOSIS — R06.02 SHORTNESS OF BREATH: ICD-10-CM

## 2019-08-07 PROBLEM — A41.9 SEPSIS (HCC): Status: ACTIVE | Noted: 2019-08-07

## 2019-08-07 LAB
ABO GROUP BLD: NORMAL
ALBUMIN SERPL BCP-MCNC: 3 G/DL (ref 3.2–4.9)
ALBUMIN/GLOB SERPL: 0.8 G/DL
ALP SERPL-CCNC: 141 U/L (ref 30–99)
ALT SERPL-CCNC: 15 U/L (ref 2–50)
ANION GAP SERPL CALC-SCNC: 12 MMOL/L (ref 0–11.9)
APTT PPP: 70.3 SEC (ref 24.7–36)
AST SERPL-CCNC: 27 U/L (ref 12–45)
BASOPHILS # BLD AUTO: 0.1 % (ref 0–1.8)
BASOPHILS # BLD: 0.01 K/UL (ref 0–0.12)
BILIRUB SERPL-MCNC: 1.8 MG/DL (ref 0.1–1.5)
BLD GP AB SCN SERPL QL: NORMAL
BUN SERPL-MCNC: 19 MG/DL (ref 8–22)
CALCIUM SERPL-MCNC: 8.4 MG/DL (ref 8.5–10.5)
CHLORIDE SERPL-SCNC: 98 MMOL/L (ref 96–112)
CK SERPL-CCNC: 26 U/L (ref 0–154)
CO2 SERPL-SCNC: 22 MMOL/L (ref 20–33)
CREAT SERPL-MCNC: 1.01 MG/DL (ref 0.5–1.4)
EKG IMPRESSION: NORMAL
EOSINOPHIL # BLD AUTO: 0.02 K/UL (ref 0–0.51)
EOSINOPHIL NFR BLD: 0.2 % (ref 0–6.9)
ERYTHROCYTE [DISTWIDTH] IN BLOOD BY AUTOMATED COUNT: 42.6 FL (ref 35.9–50)
GLOBULIN SER CALC-MCNC: 3.7 G/DL (ref 1.9–3.5)
GLUCOSE SERPL-MCNC: 186 MG/DL (ref 65–99)
HCT VFR BLD AUTO: 32.4 % (ref 42–52)
HGB BLD-MCNC: 10.7 G/DL (ref 14–18)
IMM GRANULOCYTES # BLD AUTO: 0.07 K/UL (ref 0–0.11)
IMM GRANULOCYTES NFR BLD AUTO: 0.6 % (ref 0–0.9)
INR PPP: 1.47 (ref 0.87–1.13)
LACTATE BLD-SCNC: 1.2 MMOL/L (ref 0.5–2)
LACTATE BLD-SCNC: 1.5 MMOL/L (ref 0.5–2)
LACTATE BLD-SCNC: 2.1 MMOL/L (ref 0.5–2)
LDH SERPL L TO P-CCNC: 499 U/L (ref 107–266)
LIPASE SERPL-CCNC: 4 U/L (ref 11–82)
LYMPHOCYTES # BLD AUTO: 1.08 K/UL (ref 1–4.8)
LYMPHOCYTES NFR BLD: 8.6 % (ref 22–41)
MAGNESIUM SERPL-MCNC: 1.7 MG/DL (ref 1.5–2.5)
MCH RBC QN AUTO: 29.3 PG (ref 27–33)
MCHC RBC AUTO-ENTMCNC: 33 G/DL (ref 33.7–35.3)
MCV RBC AUTO: 88.8 FL (ref 81.4–97.8)
MONOCYTES # BLD AUTO: 0.77 K/UL (ref 0–0.85)
MONOCYTES NFR BLD AUTO: 6.1 % (ref 0–13.4)
MRSA DNA SPEC QL NAA+PROBE: NORMAL
NEUTROPHILS # BLD AUTO: 10.59 K/UL (ref 1.82–7.42)
NEUTROPHILS NFR BLD: 84.4 % (ref 44–72)
NRBC # BLD AUTO: 0 K/UL
NRBC BLD-RTO: 0 /100 WBC
NT-PROBNP SERPL IA-MCNC: 2420 PG/ML (ref 0–125)
PHOSPHATE SERPL-MCNC: 3.6 MG/DL (ref 2.5–4.5)
PLATELET # BLD AUTO: 270 K/UL (ref 164–446)
PMV BLD AUTO: 10.2 FL (ref 9–12.9)
POTASSIUM SERPL-SCNC: 4.7 MMOL/L (ref 3.6–5.5)
PROCALCITONIN SERPL-MCNC: 0.46 NG/ML
PROT SERPL-MCNC: 6.7 G/DL (ref 6–8.2)
PROTHROMBIN TIME: 18.2 SEC (ref 12–14.6)
RBC # BLD AUTO: 3.65 M/UL (ref 4.7–6.1)
RH BLD: NORMAL
S PYO DNA SPEC NAA+PROBE: NOT DETECTED
SIGNIFICANT IND 70042: NORMAL
SITE SITE: NORMAL
SODIUM SERPL-SCNC: 132 MMOL/L (ref 135–145)
SOURCE SOURCE: NORMAL
TROPONIN T SERPL-MCNC: 17 NG/L (ref 6–19)
URATE SERPL-MCNC: 9.6 MG/DL (ref 2.5–8.3)
WBC # BLD AUTO: 12.5 K/UL (ref 4.8–10.8)

## 2019-08-07 PROCEDURE — 86850 RBC ANTIBODY SCREEN: CPT

## 2019-08-07 PROCEDURE — 80053 COMPREHEN METABOLIC PANEL: CPT

## 2019-08-07 PROCEDURE — 83735 ASSAY OF MAGNESIUM: CPT

## 2019-08-07 PROCEDURE — 84484 ASSAY OF TROPONIN QUANT: CPT

## 2019-08-07 PROCEDURE — 99223 1ST HOSP IP/OBS HIGH 75: CPT | Performed by: INTERNAL MEDICINE

## 2019-08-07 PROCEDURE — 84550 ASSAY OF BLOOD/URIC ACID: CPT

## 2019-08-07 PROCEDURE — 85610 PROTHROMBIN TIME: CPT

## 2019-08-07 PROCEDURE — 770004 HCHG ROOM/CARE - ONCOLOGY PRIVATE *

## 2019-08-07 PROCEDURE — 85025 COMPLETE CBC W/AUTO DIFF WBC: CPT

## 2019-08-07 PROCEDURE — 83690 ASSAY OF LIPASE: CPT

## 2019-08-07 PROCEDURE — 96376 TX/PRO/DX INJ SAME DRUG ADON: CPT

## 2019-08-07 PROCEDURE — 84145 PROCALCITONIN (PCT): CPT

## 2019-08-07 PROCEDURE — 700105 HCHG RX REV CODE 258: Performed by: EMERGENCY MEDICINE

## 2019-08-07 PROCEDURE — A9270 NON-COVERED ITEM OR SERVICE: HCPCS | Performed by: HOSPITALIST

## 2019-08-07 PROCEDURE — 86901 BLOOD TYPING SEROLOGIC RH(D): CPT

## 2019-08-07 PROCEDURE — 700102 HCHG RX REV CODE 250 W/ 637 OVERRIDE(OP): Performed by: INTERNAL MEDICINE

## 2019-08-07 PROCEDURE — 87040 BLOOD CULTURE FOR BACTERIA: CPT | Mod: 91

## 2019-08-07 PROCEDURE — 93005 ELECTROCARDIOGRAM TRACING: CPT | Performed by: EMERGENCY MEDICINE

## 2019-08-07 PROCEDURE — 36415 COLL VENOUS BLD VENIPUNCTURE: CPT

## 2019-08-07 PROCEDURE — 85730 THROMBOPLASTIN TIME PARTIAL: CPT

## 2019-08-07 PROCEDURE — 71045 X-RAY EXAM CHEST 1 VIEW: CPT

## 2019-08-07 PROCEDURE — 71275 CT ANGIOGRAPHY CHEST: CPT

## 2019-08-07 PROCEDURE — 86900 BLOOD TYPING SEROLOGIC ABO: CPT

## 2019-08-07 PROCEDURE — 96367 TX/PROPH/DG ADDL SEQ IV INF: CPT

## 2019-08-07 PROCEDURE — 83615 LACTATE (LD) (LDH) ENZYME: CPT

## 2019-08-07 PROCEDURE — 700117 HCHG RX CONTRAST REV CODE 255: Performed by: EMERGENCY MEDICINE

## 2019-08-07 PROCEDURE — 83880 ASSAY OF NATRIURETIC PEPTIDE: CPT

## 2019-08-07 PROCEDURE — 700111 HCHG RX REV CODE 636 W/ 250 OVERRIDE (IP): Performed by: EMERGENCY MEDICINE

## 2019-08-07 PROCEDURE — 700105 HCHG RX REV CODE 258: Performed by: HOSPITALIST

## 2019-08-07 PROCEDURE — A9270 NON-COVERED ITEM OR SERVICE: HCPCS | Performed by: INTERNAL MEDICINE

## 2019-08-07 PROCEDURE — 700102 HCHG RX REV CODE 250 W/ 637 OVERRIDE(OP): Performed by: HOSPITALIST

## 2019-08-07 PROCEDURE — 87651 STREP A DNA AMP PROBE: CPT

## 2019-08-07 PROCEDURE — 96366 THER/PROPH/DIAG IV INF ADDON: CPT

## 2019-08-07 PROCEDURE — 96375 TX/PRO/DX INJ NEW DRUG ADDON: CPT

## 2019-08-07 PROCEDURE — 700111 HCHG RX REV CODE 636 W/ 250 OVERRIDE (IP): Performed by: HOSPITALIST

## 2019-08-07 PROCEDURE — 74177 CT ABD & PELVIS W/CONTRAST: CPT

## 2019-08-07 PROCEDURE — 99285 EMERGENCY DEPT VISIT HI MDM: CPT

## 2019-08-07 PROCEDURE — 83605 ASSAY OF LACTIC ACID: CPT

## 2019-08-07 PROCEDURE — 96365 THER/PROPH/DIAG IV INF INIT: CPT

## 2019-08-07 PROCEDURE — 700111 HCHG RX REV CODE 636 W/ 250 OVERRIDE (IP): Performed by: INTERNAL MEDICINE

## 2019-08-07 PROCEDURE — 700105 HCHG RX REV CODE 258: Performed by: INTERNAL MEDICINE

## 2019-08-07 PROCEDURE — 87641 MR-STAPH DNA AMP PROBE: CPT

## 2019-08-07 PROCEDURE — 82550 ASSAY OF CK (CPK): CPT

## 2019-08-07 PROCEDURE — 84100 ASSAY OF PHOSPHORUS: CPT

## 2019-08-07 RX ORDER — WARFARIN SODIUM 1 MG/1
1 TABLET ORAL
Status: DISCONTINUED | OUTPATIENT
Start: 2019-08-08 | End: 2019-08-10

## 2019-08-07 RX ORDER — ONDANSETRON 2 MG/ML
4 INJECTION INTRAMUSCULAR; INTRAVENOUS EVERY 4 HOURS PRN
Status: DISCONTINUED | OUTPATIENT
Start: 2019-08-07 | End: 2019-08-07

## 2019-08-07 RX ORDER — AMOXICILLIN 250 MG
2 CAPSULE ORAL 2 TIMES DAILY
Status: DISCONTINUED | OUTPATIENT
Start: 2019-08-07 | End: 2019-08-10 | Stop reason: HOSPADM

## 2019-08-07 RX ORDER — SUNITINIB MALATE 50 MG/1
1 CAPSULE ORAL DAILY
Status: ON HOLD | COMMUNITY
Start: 2019-08-02 | End: 2019-08-10

## 2019-08-07 RX ORDER — ONDANSETRON 4 MG/1
4 TABLET, ORALLY DISINTEGRATING ORAL EVERY 4 HOURS PRN
Status: DISCONTINUED | OUTPATIENT
Start: 2019-08-07 | End: 2019-08-07

## 2019-08-07 RX ORDER — WARFARIN SODIUM 2 MG/1
2 TABLET ORAL
Status: COMPLETED | OUTPATIENT
Start: 2019-08-07 | End: 2019-08-07

## 2019-08-07 RX ORDER — SODIUM CHLORIDE 9 MG/ML
500 INJECTION, SOLUTION INTRAVENOUS
Status: DISCONTINUED | OUTPATIENT
Start: 2019-08-07 | End: 2019-08-10 | Stop reason: HOSPADM

## 2019-08-07 RX ORDER — OXYCODONE HYDROCHLORIDE 5 MG/1
5 TABLET ORAL EVERY 4 HOURS PRN
Status: DISCONTINUED | OUTPATIENT
Start: 2019-08-07 | End: 2019-08-10 | Stop reason: HOSPADM

## 2019-08-07 RX ORDER — ACETAMINOPHEN 325 MG/1
650 TABLET ORAL EVERY 6 HOURS PRN
Status: DISCONTINUED | OUTPATIENT
Start: 2019-08-07 | End: 2019-08-10 | Stop reason: HOSPADM

## 2019-08-07 RX ORDER — DOXYCYCLINE 100 MG/1
100 TABLET ORAL EVERY 12 HOURS
Status: DISCONTINUED | OUTPATIENT
Start: 2019-08-07 | End: 2019-08-07

## 2019-08-07 RX ORDER — CARVEDILOL 12.5 MG/1
12.5 TABLET ORAL 2 TIMES DAILY WITH MEALS
Status: DISCONTINUED | OUTPATIENT
Start: 2019-08-07 | End: 2019-08-10 | Stop reason: HOSPADM

## 2019-08-07 RX ORDER — BISACODYL 10 MG
10 SUPPOSITORY, RECTAL RECTAL
Status: DISCONTINUED | OUTPATIENT
Start: 2019-08-07 | End: 2019-08-10 | Stop reason: HOSPADM

## 2019-08-07 RX ORDER — LACOSAMIDE 100 MG/1
200 TABLET ORAL 2 TIMES DAILY
Status: DISCONTINUED | OUTPATIENT
Start: 2019-08-07 | End: 2019-08-10 | Stop reason: HOSPADM

## 2019-08-07 RX ORDER — LEVETIRACETAM 500 MG/1
2000 TABLET ORAL 2 TIMES DAILY
Status: DISCONTINUED | OUTPATIENT
Start: 2019-08-07 | End: 2019-08-10 | Stop reason: HOSPADM

## 2019-08-07 RX ORDER — POLYETHYLENE GLYCOL 3350 17 G/17G
1 POWDER, FOR SOLUTION ORAL
Status: DISCONTINUED | OUTPATIENT
Start: 2019-08-07 | End: 2019-08-10 | Stop reason: HOSPADM

## 2019-08-07 RX ORDER — WARFARIN SODIUM 1 MG/1
1 TABLET ORAL DAILY
COMMUNITY
End: 2019-09-05

## 2019-08-07 RX ORDER — FUROSEMIDE 10 MG/ML
20 INJECTION INTRAMUSCULAR; INTRAVENOUS
Status: DISCONTINUED | OUTPATIENT
Start: 2019-08-07 | End: 2019-08-08

## 2019-08-07 RX ORDER — LISINOPRIL 10 MG/1
10 TABLET ORAL DAILY
Status: DISCONTINUED | OUTPATIENT
Start: 2019-08-07 | End: 2019-08-09

## 2019-08-07 RX ORDER — SODIUM CHLORIDE, SODIUM LACTATE, POTASSIUM CHLORIDE, CALCIUM CHLORIDE 600; 310; 30; 20 MG/100ML; MG/100ML; MG/100ML; MG/100ML
30 INJECTION, SOLUTION INTRAVENOUS ONCE
Status: COMPLETED | OUTPATIENT
Start: 2019-08-07 | End: 2019-08-07

## 2019-08-07 RX ORDER — TRAZODONE HYDROCHLORIDE 50 MG/1
50 TABLET ORAL
Status: DISCONTINUED | OUTPATIENT
Start: 2019-08-07 | End: 2019-08-10 | Stop reason: HOSPADM

## 2019-08-07 RX ADMIN — CARVEDILOL 12.5 MG: 12.5 TABLET, FILM COATED ORAL at 17:37

## 2019-08-07 RX ADMIN — ACETAMINOPHEN 650 MG: 325 TABLET, FILM COATED ORAL at 08:00

## 2019-08-07 RX ADMIN — VANCOMYCIN HYDROCHLORIDE 2100 MG: 500 INJECTION, POWDER, LYOPHILIZED, FOR SOLUTION INTRAVENOUS at 04:33

## 2019-08-07 RX ADMIN — LEVETIRACETAM 2000 MG: 500 TABLET, FILM COATED ORAL at 17:36

## 2019-08-07 RX ADMIN — TRAZODONE HYDROCHLORIDE 50 MG: 50 TABLET ORAL at 22:55

## 2019-08-07 RX ADMIN — VANCOMYCIN HYDROCHLORIDE 1200 MG: 500 INJECTION, POWDER, LYOPHILIZED, FOR SOLUTION INTRAVENOUS at 17:35

## 2019-08-07 RX ADMIN — LEVETIRACETAM 2000 MG: 500 TABLET, FILM COATED ORAL at 12:31

## 2019-08-07 RX ADMIN — CEFEPIME 2 G: 2 INJECTION, POWDER, FOR SOLUTION INTRAVENOUS at 22:13

## 2019-08-07 RX ADMIN — FUROSEMIDE 20 MG: 10 INJECTION, SOLUTION INTRAMUSCULAR; INTRAVENOUS at 12:31

## 2019-08-07 RX ADMIN — FENTANYL CITRATE 100 MCG: 50 INJECTION, SOLUTION INTRAMUSCULAR; INTRAVENOUS at 06:20

## 2019-08-07 RX ADMIN — CEFEPIME 2 G: 2 INJECTION, POWDER, FOR SOLUTION INTRAVENOUS at 03:50

## 2019-08-07 RX ADMIN — LACOSAMIDE 200 MG: 100 TABLET, FILM COATED ORAL at 08:01

## 2019-08-07 RX ADMIN — LACOSAMIDE 200 MG: 100 TABLET, FILM COATED ORAL at 20:46

## 2019-08-07 RX ADMIN — IOHEXOL 100 ML: 350 INJECTION, SOLUTION INTRAVENOUS at 05:33

## 2019-08-07 RX ADMIN — SODIUM CHLORIDE, POTASSIUM CHLORIDE, SODIUM LACTATE AND CALCIUM CHLORIDE 2505 ML: 600; 310; 30; 20 INJECTION, SOLUTION INTRAVENOUS at 03:55

## 2019-08-07 RX ADMIN — FENTANYL CITRATE 100 MCG: 50 INJECTION, SOLUTION INTRAMUSCULAR; INTRAVENOUS at 03:56

## 2019-08-07 RX ADMIN — ENOXAPARIN SODIUM 80 MG: 100 INJECTION SUBCUTANEOUS at 17:36

## 2019-08-07 RX ADMIN — CEFEPIME 2 G: 2 INJECTION, POWDER, FOR SOLUTION INTRAVENOUS at 12:31

## 2019-08-07 RX ADMIN — WARFARIN SODIUM 2 MG: 2 TABLET ORAL at 17:45

## 2019-08-07 RX ADMIN — CARVEDILOL 12.5 MG: 12.5 TABLET, FILM COATED ORAL at 08:02

## 2019-08-07 RX ADMIN — LISINOPRIL 10 MG: 10 TABLET ORAL at 08:02

## 2019-08-07 ASSESSMENT — ENCOUNTER SYMPTOMS
COUGH: 1
VOMITING: 0
PALPITATIONS: 0
FEVER: 0
SPUTUM PRODUCTION: 1
BLOOD IN STOOL: 0
DIZZINESS: 0
FLANK PAIN: 0
NAUSEA: 1
BLURRED VISION: 0
NECK PAIN: 0
SHORTNESS OF BREATH: 1
HEADACHES: 0
FOCAL WEAKNESS: 0
ABDOMINAL PAIN: 0
BRUISES/BLEEDS EASILY: 0
DIARRHEA: 0
SEIZURES: 0
DIAPHORESIS: 0
CHILLS: 0
MYALGIAS: 0
WHEEZING: 0
BACK PAIN: 0
SORE THROAT: 0

## 2019-08-07 ASSESSMENT — LIFESTYLE VARIABLES
CONSUMPTION TOTAL: NEGATIVE
TOTAL SCORE: 0
EVER FELT BAD OR GUILTY ABOUT YOUR DRINKING: NO
HOW MANY TIMES IN THE PAST YEAR HAVE YOU HAD 5 OR MORE DRINKS IN A DAY: 0
TOTAL SCORE: 0
DOES PATIENT WANT TO STOP DRINKING: NO
TOTAL SCORE: 0
EVER HAD A DRINK FIRST THING IN THE MORNING TO STEADY YOUR NERVES TO GET RID OF A HANGOVER: NO
CONSUMPTION TOTAL: INCOMPLETE
HAVE YOU EVER FELT YOU SHOULD CUT DOWN ON YOUR DRINKING: NO
EVER FELT BAD OR GUILTY ABOUT YOUR DRINKING: NO
TOTAL SCORE: 0
ALCOHOL_USE: NO
AVERAGE NUMBER OF DAYS PER WEEK YOU HAVE A DRINK CONTAINING ALCOHOL: 0
HAVE PEOPLE ANNOYED YOU BY CRITICIZING YOUR DRINKING: NO
EVER HAD A DRINK FIRST THING IN THE MORNING TO STEADY YOUR NERVES TO GET RID OF A HANGOVER: NO
HAVE YOU EVER FELT YOU SHOULD CUT DOWN ON YOUR DRINKING: NO
TOTAL SCORE: 0
HAVE PEOPLE ANNOYED YOU BY CRITICIZING YOUR DRINKING: NO
ON A TYPICAL DAY WHEN YOU DRINK ALCOHOL HOW MANY DRINKS DO YOU HAVE: 0
DOES PATIENT WANT TO STOP DRINKING: NO
ALCOHOL_USE: NO
TOTAL SCORE: 0

## 2019-08-07 ASSESSMENT — PATIENT HEALTH QUESTIONNAIRE - PHQ9
1. LITTLE INTEREST OR PLEASURE IN DOING THINGS: SEVERAL DAYS
8. MOVING OR SPEAKING SO SLOWLY THAT OTHER PEOPLE COULD HAVE NOTICED. OR THE OPPOSITE, BEING SO FIGETY OR RESTLESS THAT YOU HAVE BEEN MOVING AROUND A LOT MORE THAN USUAL: NOT AT ALL
2. FEELING DOWN, DEPRESSED, IRRITABLE, OR HOPELESS: SEVERAL DAYS
3. TROUBLE FALLING OR STAYING ASLEEP OR SLEEPING TOO MUCH: NOT AT ALL
SUM OF ALL RESPONSES TO PHQ9 QUESTIONS 1 AND 2: 2
9. THOUGHTS THAT YOU WOULD BE BETTER OFF DEAD, OR OF HURTING YOURSELF: NOT AT ALL
5. POOR APPETITE OR OVEREATING: NEARLY EVERY DAY
7. TROUBLE CONCENTRATING ON THINGS, SUCH AS READING THE NEWSPAPER OR WATCHING TELEVISION: NOT AT ALL
6. FEELING BAD ABOUT YOURSELF - OR THAT YOU ARE A FAILURE OR HAVE LET YOURSELF OR YOUR FAMILY DOWN: NOT AL ALL
4. FEELING TIRED OR HAVING LITTLE ENERGY: SEVERAL DAYS
SUM OF ALL RESPONSES TO PHQ QUESTIONS 1-9: 6

## 2019-08-07 ASSESSMENT — COGNITIVE AND FUNCTIONAL STATUS - GENERAL
SUGGESTED CMS G CODE MODIFIER DAILY ACTIVITY: CJ
STANDING UP FROM CHAIR USING ARMS: A LITTLE
DAILY ACTIVITIY SCORE: 22
DRESSING REGULAR LOWER BODY CLOTHING: A LITTLE
MOVING FROM LYING ON BACK TO SITTING ON SIDE OF FLAT BED: A LITTLE
HELP NEEDED FOR BATHING: A LITTLE
WALKING IN HOSPITAL ROOM: A LITTLE
MOBILITY SCORE: 21
SUGGESTED CMS G CODE MODIFIER MOBILITY: CJ

## 2019-08-07 NOTE — ASSESSMENT & PLAN NOTE
Hold Suttent for concerns of adverse reaction  Dr. 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  However given new onset of severe CHF, Dr. Gao states will need to hold all chemotherapy at this time.  Dr. Gao requesting cardiology consult for EF 15%.

## 2019-08-07 NOTE — ASSESSMENT & PLAN NOTE
Previous echo on 5/19 reveals low normal EF of 50% and moderate mitral stenosis  proBNP elevated at 2420   IV Lasix 20 mg daily.  Repeat 2D echo with reduced EF 15%.  Fluid and salt restriction  Cardiology consult for new onset CHF to 15.  Likely secondary to cardiotoxic effects of Sutent chemotherapy  Spironolactone ACE inhibitor beta-blocker started.  Patient already on Coumadin.  She has not been able to tolerate his ACE inhibitor or Coreg due to systolic blood pressure less than 100.  I did change parameters to systolic blood pressure less than 90.  Decrease the spironolactone 25 to 12.5 mg daily.  Discontinue Lasix.  Decreased ACE inhibitor from 10 to 2.5 mg daily continue Coreg as tolerated 12.5 twice daily.  This was all discussed with Dr. Alarcon cardiology.  Plan is to repeat echocardiogram in 3 months to check on EF.  At that point they will consider pacemaker.

## 2019-08-07 NOTE — PROGRESS NOTES
1100- Patient arrived on unit maintaining SPO2 on RA. No acute complaints of pain at this time.    1430- Spoke with patients wife and updated her on patients current status and POC.

## 2019-08-07 NOTE — ED NOTES
"Per family, Pt \" looks much better than when he came in. \"  Pt resting comfortably on gurney.   "

## 2019-08-07 NOTE — PROGRESS NOTES
Inpatient Anticoagulation Service Note    Date: 8/7/2019    Reason for Anticoagulation: Stroke, Other (Comments), Mitral Valve Repair(Antiphospholipid syndrome)   Target INR: 2.0 to 3.0     Hemoglobin Value: (!) 10.7  Hematocrit Value: (!) 32.4    INR from last 7 days     Date/Time INR Value    08/07/19 0336  (!) 1.47        Dose from last 7 days     Date/Time Dose (mg)    08/07/19 1440  1        Average Dose (mg): 1  Significant Interactions: Antibiotics  Bridge Therapy: No   Reversal Agent Administered: Not Applicable    A/P: Patient admitted for shortness of breath. Recent diagnosis of GI stromal tumor, previously on Gleevec, however switched to Sunitinib 2 days PTA. Patient is on warfarin for history of CVA, mitral valve repair, and antiphospholipid syndrome and is followed by the [facility]. H/H low, however there are no documented s/s of bleeding noted per chart review. Patient started on cefepime/vancomycin in the ED for suspected sepsis, otherwise there are no significant DDIs present at this time. Of note, patient does have elevated BNP which could lead to increased INR response if patient is having a heart failure exacerbation. Per d/w MD, will bridge with therapeutic LMWH given subtherapeutic INR on admit. Per review of outpatient [facility] records, patient's warfarin requirements have been progressively decreasing over the past several weeks. Will give warfarin 2 mg po x 1 tonight, followed by home dose of warfarin 1 mg po daily starting tomorrow. Repeat INR with AM labs.     Education Material Provided?: No(Patient on chronic VKA therapy - followed by [facility])  Pharmacist suggested discharge dosing: To be determined pending INR response.      Alyson Case, PharmD, BCOP

## 2019-08-07 NOTE — ASSESSMENT & PLAN NOTE
Patient was given a dose of IV vancomycin and IV cefepime in the ER.  Discontinue vancomycin IV.  Change cefepime to Rocephin IV/augmentin for 5-day total treatment.  Elevated pro calcitonin  MRSA screening negative  RT protocol  Continue supplemental oxygen, wean as tolerated  Follow blood cultures

## 2019-08-07 NOTE — DISCHARGE PLANNING
Care Transition Team Assessment    Information Source  Information Given By: Patient         Elopement Risk  Legal Hold: No  Ambulatory or Self Mobile in Wheelchair: Yes  Disoriented: No  Psychiatric Symptoms: None  History of Wandering: No  Elopement this Admit: No  Vocalizing Wanting to Leave: No  Displays Behaviors, Body Language Wanting to Leave: No-Not at Risk for Elopement  Elopement Risk: Not at Risk for Elopement    Interdisciplinary Discharge Planning  Lives with - Patient's Self Care Capacity: Spouse  Patient or legal guardian wants to designate a caregiver (see row info): No  Support Systems: Spouse / Significant Other, Friends / Neighbors  Housing / Facility: 2 Story House  Able to Return to Previous ADL's: Yes  Mobility Issues: No  Prior Services: None  Patient Expects to be Discharged to:: home  Assistance Needed: Unknown at this Time  Durable Medical Equipment: Not Applicable    Discharge Preparedness  What is your plan after discharge?: Home with help  What are your discharge supports?: Spouse  Prior Functional Level: Ambulatory, Independent with Activities of Daily Living, Independent with Medication Management    Functional Assesment  Prior Functional Level: Ambulatory, Independent with Activities of Daily Living, Independent with Medication Management         Vision / Hearing Impairment  Vision Impairment : No  Hearing Impairment : No         Advance Directive  Advance Directive?: DPOA for Health Care    Domestic Abuse  Have you ever been the victim of abuse or violence?: No  Physical Abuse or Sexual Abuse: No  Verbal Abuse or Emotional Abuse: No  Possible Abuse Reported to:: Not Applicable              Anticipated Discharge Information  Anticipated discharge disposition: Home  Discharge Address: [address] [place] [address] [place] [id number]  Discharge Contact Phone Number: [phone]

## 2019-08-07 NOTE — ED NOTES
Med Rec completed per patient   Allergies reviewed  No ORAL antibiotics in last 14 days      INR goal:   2.0-3.0   TTR:   73.8 % (4 y)   INR used for dosin.10 (2019)   Warfarin maintenance plan:   1 mg (1 mg x 1) every day   Weekly warfarin total:   7 mg       Patient D/C'd his Sutent 2 days ago due to side effects he was having

## 2019-08-07 NOTE — ED NOTES
This patient's attending physician will be Dr. Owen as of 0700 this AM.  Please page her with questions/updates.

## 2019-08-07 NOTE — ASSESSMENT & PLAN NOTE
This is severe sepsis with the following associated acute organ dysfunction(s): acute respiratory failure.   Likely source is pneumonia  DC'd IV fluids.  Lactate is elevated at 2.1 and trending down.  Discontinued IV Vancomycin and Cefepime, de-escalate it to Rocephin IV x5 days.  Follow blood  cultures

## 2019-08-07 NOTE — ED NOTES
Report received from Jessica ARREDONDO, assumed care of patient.  White board updated, POC discussed.  Call light and belongings within reach.  Bed in lowest position.

## 2019-08-07 NOTE — ASSESSMENT & PLAN NOTE
Likely secondary to CHF exacerbation versus pneumonia  Started on 2 L of oxygen by nasal cannula  RT protocol

## 2019-08-07 NOTE — PROGRESS NOTES
"Pharmacy Kinetics 64 y.o. male on vancomycin day # 1 2019    Currently on Vancomycin new start     Indication for Treatment: CAP    Pertinent history per medical record: Admitted on 2019 for sepsis with likely PNA source. Patient diagnosed with GI stromal tumor 2 months ago and was trialed on Gleevac, which he was unable to tolerate. On 19, patient switched to Sutent from Gleevac at which time patient developed SOB, productive cough, chills, and nausea. Denies any fevers. In the ER patient was hypoxic and placed on 2 L of oxygen. PCT and lactate elevated at 0.46 and 2.1, respectively.    Other antibiotics: cefepime 2 g    Allergies: Patient has no known allergies.     List concerns for renal function: CHF, obesity (BMI 30)    Pertinent cultures to date:   19 GAS PCR negative   19 Blood cultures in process  19 UA negative   19 Sputum culture waiting to be collected    MRSA nares swab if pneumonia is a concern (ordered/positive/negative/n-a): ordered but not collected    Recent Labs     19  0336   WBC 12.5*   NEUTSPOLYS 84.40*     Recent Labs     19  0336   BUN 19   CREATININE 1.01   ALBUMIN 3.0*     No results for input(s): VANCOTROUGH, VANCOPEAK, VANCORANDOM in the last 72 hours.    Intake/Output Summary (Last 24 hours) at 2019 0928  Last data filed at 2019 0448  Gross per 24 hour   Intake 983.33 ml   Output --   Net 983.33 ml      /77   Pulse (!) 105   Temp 36.5 °C (97.7 °F) (Rectal)   Resp 18   Ht 1.651 m (5' 5\")   Wt 83.5 kg (184 lb)   SpO2 94%  Temp (24hrs), Av.7 °C (96.2 °F), Min:34.8 °C (94.6 °F), Max:36.5 °C (97.7 °F)      A/P   1. Vancomycin dose change: start vancomycin 15 mg/kg (1200 mg) Q12 hours (1700/0500)  2. Next vancomycin level:  @ 0430 (not ordered) - Draw trough earlier if renal function declines.  3. Goal trough: 16-20 mcg/ml  4. Comments: Starting patient on 15 mg/kg Q12 hours per protocol. Minimal concerns for accumulation as " patient renal function stable, no previous vancomycin dosing history to assess clearance. Cultures currently pending. Steady state level as above.    Ramiro AlbertsD

## 2019-08-07 NOTE — ED PROVIDER NOTES
"ED PROVIDER NOTE     Scribed for Larry Burdick M.D. by Emilio Mccallum. 8/7/2019, 3:31 AM.    CHIEF COMPLAINT  Chief Complaint   Patient presents with   • Shortness of Breath     pt presents c/o SOB after taking new oral chemo therapy med \"sultent\" started taking it yesterday. hx of rxn to past oral chemo meds. 92% RA. pt brought back directly from lobby to room. ERP at bedside. hx of stomach tumor       HPI    Primary care provider: Tai Fitch M.D.  Means of arrival: Walk in  History obtained from: Patient  History limited by: None     Roque Nguyen is a 64 y.o. male who presents with with shortness of breath for the past 1-2 hours. Patient just started sutent medication for chemotherapy yesterday after being changed from gleevec for a GIST. He does not get IV chemo therapy. Patient also endorses diaphoresis, abdominal pain, sore throat, and dry heaving with shortness of breath onset as well. He also endorses decreased urinary output.  No alleviating measures attempted, no noted aggravating factors.  Denies any prior episodes.  He has had a slightly similar reaction to Gleevec in the past but it was not this profound.  His abdominal pain is described as generalized, nonradiating, achy and sharp in nature.  He denies dysuria, chest pain, cough or rhinorrhea. Surgical history includes cholecystectomy and mitral valve repair.    REVIEW OF SYSTEMS  Constitutional: Negative for fevers but positive for chills and fatigue per  HENT: Positive sore throat. Negative rhinorrhea  Respiratory: Positive for shortness of breath.  Negative cough  Cardiovascular: Negative for chest pain   Gastrointestinal: Positive for abdominal pain, dry heaving.   Genitourinary: Negative for dysuria. Positive urinary retention  Skin: Positive diaphoresis  All other systems reviewed and are negative.    PAST MEDICAL HISTORY   has a past medical history of Antiphospholipid syndrome (HCC) (3/15/2010), ASTHMA, Cancer (HCC) (2014), " Chronic anticoagulation, Congestive heart failure (HCC), CVA (cerebral vascular accident) ([facility]) (2015), Dental disorder, Epilepsy ([facility]) (2018), Hemorrhagic disorder ([facility]), Hypertension, LBBB (left bundle branch block), LV dysfunction (2015), Mitral regurgitation (2015), S/P mitral valve repair, Seizure disorder (HCC) (), Snoring, TIA (transient ischemic attack) (), and Tremors of nervous system.    PAST FAMILY HISTORY  Family History   Problem Relation Age of Onset   • Cancer Paternal Uncle         Lung cancer   • Lung Disease Mother    • Heart Disease Mother    • Hypertension Mother    • Diabetes Father    • Heart Disease Father    • Psychiatric Illness Sister    • Hypertension Sister    • Lung Disease Paternal Aunt    • Hypertension Paternal Aunt    • Psychiatric Illness Maternal Grandfather    • Stroke Neg Hx        SOCIAL HISTORY  Social History     Tobacco Use   • Smoking status: Former Smoker     Packs/day: 1.00     Years: 37.00     Pack years: 37.00     Types: Cigarettes     Start date: 1985     Last attempt to quit: 2012     Years since quittin.0   • Smokeless tobacco: Never Used   Substance and Sexual Activity   • Alcohol use: Yes     Alcohol/week: 0.0 oz     Comment: occ   • Drug use: No   • Sexual activity: Yes     Partners: Female     Comment: , retired [employer]       SURGICAL HISTORY   has a past surgical history that includes inguinal hernia repair (2010); cholecystectomy; recovery (5/15/2015); and mitral valve repair (2015).    CURRENT MEDICATIONS  No current facility-administered medications on file prior to encounter.      Current Outpatient Medications on File Prior to Encounter   Medication Sig Dispense Refill   • warfarin (COUMADIN) 1 MG Tab Take 1 mg by mouth every day. INR goal:   2.0-3.0  TTR:   73.8 % (4 y)  INR used for dosin.10 (2019)  Warfarin maintenance plan:   1 mg (1 mg x 1) every day  Weekly warfarin total:   7 mg   "   • SUTENT 50 MG capsule Take 1 Tab by mouth every day.     • carvedilol (COREG) 12.5 MG Tab Take 1 Tab by mouth 2 times a day, with meals. 180 Tab 3   • lisinopril (PRINIVIL) 10 MG Tab TAKE ONE TABLET BY MOUTH EVERY DAY 90 Tab 3   • lacosamide (VIMPAT) 200 MG Tab tablet Take 200 mg by mouth 2 Times a Day for 181 days. 180 Tab 5   • levetiracetam (KEPPRA) 1000 MG tablet Take 2 Tabs by mouth 2 Times a Day. 120 Tab 11   • multivitamin (THERAGRAN) Tab Take 1 Tab by mouth every day.     • calcium citrate (CALCITRATE) 950 MG Tab Take 950 mg by mouth every day.          ALLERGIES  No Known Allergies    PHYSICAL EXAM  VITAL SIGNS: /71   Pulse 88   Resp 18   Ht 1.651 m (5' 5\")   Wt 83.5 kg (184 lb)   SpO2 92%   BMI 30.62 kg/m²    Pulse ox interpretation: On room air, I interpret this pulse ox as normal.  Constitutional: Chronically ill appearing. Sitting up in moderate distress.   HEENT: Normocephalic, atraumatic. Posterior pharynx clear, dry mucous membranes. No exudates  Eyes:  EOMI. Pale conjunctiva, PERLLA 3-2.  Neck: Supple, nontender.  Chest/Pulmonary:  decreased lung sounds bilaterally worse at right base. no wheezes or rhonchi.  Cardiovascular: Tachycardic rate and Regular rhythm, no obvious murmur.   Abdomen: Soft, diffuse abdominal tenderness; no rebound  : Normal external genitalia  Back: No CVA or midline tenderness.   Musculoskeletal: No deformity or edema.  Neuro: Clear speech, normal coordination, cranial nerves II-XII grossly intact, no focal asymmetry or sensory deficits.   Psych: Normal mood and affect.  Skin: Pale, diaphoretic, cool to touch.      DIAGNOSTIC STUDIES / PROCEDURES    LABS & EKG  Results for orders placed or performed during the hospital encounter of 08/07/19   CBC WITH DIFFERENTIAL   Result Value Ref Range    WBC 12.5 (H) 4.8 - 10.8 K/uL    RBC 3.65 (L) 4.70 - 6.10 M/uL    Hemoglobin 10.7 (L) 14.0 - 18.0 g/dL    Hematocrit 32.4 (L) 42.0 - 52.0 %    MCV 88.8 81.4 - 97.8 fL    " MCH 29.3 27.0 - 33.0 pg    MCHC 33.0 (L) 33.7 - 35.3 g/dL    RDW 42.6 35.9 - 50.0 fL    Platelet Count 270 164 - 446 K/uL    MPV 10.2 9.0 - 12.9 fL    Neutrophils-Polys 84.40 (H) 44.00 - 72.00 %    Lymphocytes 8.60 (L) 22.00 - 41.00 %    Monocytes 6.10 0.00 - 13.40 %    Eosinophils 0.20 0.00 - 6.90 %    Basophils 0.10 0.00 - 1.80 %    Immature Granulocytes 0.60 0.00 - 0.90 %    Nucleated RBC 0.00 /100 WBC    Neutrophils (Absolute) 10.59 (H) 1.82 - 7.42 K/uL    Lymphs (Absolute) 1.08 1.00 - 4.80 K/uL    Monos (Absolute) 0.77 0.00 - 0.85 K/uL    Eos (Absolute) 0.02 0.00 - 0.51 K/uL    Baso (Absolute) 0.01 0.00 - 0.12 K/uL    Immature Granulocytes (abs) 0.07 0.00 - 0.11 K/uL    NRBC (Absolute) 0.00 K/uL   COMP METABOLIC PANEL   Result Value Ref Range    Sodium 132 (L) 135 - 145 mmol/L    Potassium 4.7 3.6 - 5.5 mmol/L    Chloride 98 96 - 112 mmol/L    Co2 22 20 - 33 mmol/L    Anion Gap 12.0 (H) 0.0 - 11.9    Glucose 186 (H) 65 - 99 mg/dL    Bun 19 8 - 22 mg/dL    Creatinine 1.01 0.50 - 1.40 mg/dL    Calcium 8.4 (L) 8.5 - 10.5 mg/dL    AST(SGOT) 27 12 - 45 U/L    ALT(SGPT) 15 2 - 50 U/L    Alkaline Phosphatase 141 (H) 30 - 99 U/L    Total Bilirubin 1.8 (H) 0.1 - 1.5 mg/dL    Albumin 3.0 (L) 3.2 - 4.9 g/dL    Total Protein 6.7 6.0 - 8.2 g/dL    Globulin 3.7 (H) 1.9 - 3.5 g/dL    A-G Ratio 0.8 g/dL   LACTIC ACID   Result Value Ref Range    Lactic Acid 2.1 (H) 0.5 - 2.0 mmol/L   TROPONIN   Result Value Ref Range    Troponin T 17 6 - 19 ng/L   proBrain Natriuretic Peptide, NT   Result Value Ref Range    NT-proBNP 2420 (H) 0 - 125 pg/mL   APTT   Result Value Ref Range    APTT 70.3 (H) 24.7 - 36.0 sec   PROTHROMBIN TIME   Result Value Ref Range    PT 18.2 (H) 12.0 - 14.6 sec    INR 1.47 (H) 0.87 - 1.13   COD (ADULT)   Result Value Ref Range    ABO Grouping Only A     Rh Grouping Only POS     Antibody Screen-Cod NEG    MAGNESIUM   Result Value Ref Range    Magnesium 1.7 1.5 - 2.5 mg/dL   LIPASE   Result Value Ref Range     Lipase 4 (L) 11 - 82 U/L   Group A Strep by PCR   Result Value Ref Range    Group A Strep by PCR Not Detected Not Detected   ESTIMATED GFR   Result Value Ref Range    GFR If African American >60 >60 mL/min/1.73 m 2    GFR If Non African American >60 >60 mL/min/1.73 m 2   CREATINE KINASE   Result Value Ref Range    CPK Total 26 0 - 154 U/L   URIC ACID   Result Value Ref Range    Uric Acid 9.6 (H) 2.5 - 8.3 mg/dL   PHOSPHORUS   Result Value Ref Range    Phosphorus 3.6 2.5 - 4.5 mg/dL   LDH   Result Value Ref Range    LDH Total 499 (H) 107 - 266 U/L   PROCALCITONIN   Result Value Ref Range    Procalcitonin 0.46 (H) <0.25 ng/mL   EKG   Result Value Ref Range    Report       [facility]    Test Date:  2019  Pt Name:    EJ NIETO               Department: ER  MRN:        [id number]                      Room:        10  Gender:     Male                         Technician: [id number]  :        1955                   Requested By:LARRY MOBLEY  Order #:    [id number]                    Reading MD: Larry Mobley MD    Measurements  Intervals                                Axis  Rate:       88                           P:          3  NY:         220                          QRS:        85  QRSD:       158                          T:          -83  QT:         448  QTc:        542    Interpretive Statements  SINUS RHYTHM  FIRST DEGREE AV BLOCK  NONSPECIFIC INTRAVENTRICULAR CONDUCTION DELAY  ANTERIOR INFARCT, OLD  BASELINE WANDER IN LEAD(S) III,aVL,aVF  Compared to ECG 2015 23:11:31  First degree AV block now present  Atrial flutter no longer present  Sinus tachycardia no longer present  Myocardial infarc t finding still present  No STEMI  Electronically Signed On 2019 6:24:47 PDT by Larry Mobley MD          RADIOLOGY  CT-CTA CHEST PULMONARY ARTERY W/ RECONS   Final Result         1.  No evidence of pulmonary embolism.   2.  Cardiomegaly.   3.  Interlobular  septal thickening most likely related to pulmonary edema. Patchy bilateral groundglass opacities also likely related to pulmonary edema. Superimposed infection is not excluded and clinical correlation is recommended. Small right and    trace left pleural effusions.   4.  Nonspecific mild mediastinal and hilar adenopathy which is mildly increased from prior.            CT-ABDOMEN-PELVIS WITH   Final Result         Significant increase in size of malignant mesenteric soft tissue mass in comparison to prior studies. No new metastatic disease is seen.      Findings within the lung bases as detailed on separate chest CT report.               DX-CHEST-PORTABLE (1 VIEW)   Final Result      Borderline cardiac silhouette enlargement. Bilateral surgical and perihilar opacities which most likely represent edema, with small right pleural effusion. Infection should be excluded clinically.      EC-ECHOCARDIOGRAM COMPLETE W/O CONT    (Results Pending)       COURSE & MEDICAL DECISION MAKING    This is a 64 y.o. male who presents with dyspnea and abdominal pain in the setting of starting a new oral chemotherapeutic agent.    Differential Diagnosis includes but is not limited to:  Medication reaction, sepsis, PE, ACS, CHF, anemia, electrolyte abnormality, obstruction, perforation, tumor lysis syndrome    ED Course:  3:31 AM  Presents with new onset shortness of breath following new oral chemo therapy medication started 2 days ago. He has decreased lung sounds bilaterally worse at right base, he is tachycardic and has dry mucous membranes and will receive LR bolus for these symptoms as I am worried he is dehydrated and possibly septic and he must be kept n.p.o. in case a surgical process is present and identified on work-up. Patient also has diffuse abdominal tenderness. He will be treated with fentanyl, cefepime, vancomycin. Ordered chest xray, lactic acid, rapid strep, lipase, blood culture, UA, urine culture, troponin, APTT, PTINR,  COD, Mg, CBC, CMP, EKG.    EKG nonischemic stable left bundle branch block troponin negative highly doubt ACS.  He does have an elevated BNP and his chest x-ray shows bilateral infiltrates concerning for probable edema.  This could be pneumonia but he is being covered broadly with vancomycin and cefepime.  Thankfully his white count is stable slightly elevated with a leftward shift no evidence of neutropenia.  Electrolytes are stable, he has no severe acidosis, kidney function is normal.  Lactic acid level slightly elevated to 2.1.  Strep test is negative.  He has elevated uric acid and LDH.  Feeling better after fentanyl administration on reassessment.  Slightly hypoxic at times he was placed on 2 L nasal cannula.    Some delay in CT scan but this shows no PE, cardiomegaly is present, pneumonia versus edema in the lungs no pneumothorax.  He has had an increase in size of his abdominal mass but no evidence of acute surgical disease in his abdomen or pelvis.    On recheck the patient is still diaphoretic and requiring supplemental oxygen.  Plan admission to the hospital for further work-up and treatment.  He will require an echocardiogram to help differentiate if this is pneumonia or CHF, initially started with aggressive IV fluid rehydration but I will hold further fluids at this time given the risk of possible acute CHF.  Discussed with pharmacist regarding his new oral chemotherapeutic agent and there are numerous potential side effects although he only has 2 doses.  Oncology will be consulted while he is in the hospital.  The patient and family understand and agree with the plan of care.  He is hemodynamically stable for admission to the hospital in guarded condition.  I consulted with our hospitalist Dr. Urbano, he will kindly admit the patient for further work-up and treatment.  I have also ordered a procalcitonin to help differentiate if this is pneumonia versus heart failure.    Upon my evaluation, this  patient had a high probability of imminent or life-threatening deterioration due to acute respiratory failure with hypoxia, lactic acidosis.     I personally provided 35 minutes of total critical care time outside of time spent on separately billable/documented procedures. This required my direct attention, intervention, and management which included the following:  -review of laboratory data  -review of radiology studies  -discussion with consultants: Pharmacist, hospitalist  -discussion with family and patient  -monitoring for potential decompensation with frequent bedside reassessments  -Aggressive IV fluid rehydration and broad-spectrum parenteral antibiotics      Medications   vancomycin (VANCOCIN) 2,100 mg in  mL IVPB (2,100 mg Intravenous New Bag 8/7/19 0433)   senna-docusate (PERICOLACE or SENOKOT S) 8.6-50 MG per tablet 2 Tab (has no administration in time range)     And   polyethylene glycol/lytes (MIRALAX) PACKET 1 Packet (has no administration in time range)     And   magnesium hydroxide (MILK OF MAGNESIA) suspension 30 mL (has no administration in time range)     And   bisacodyl (DULCOLAX) suppository 10 mg (has no administration in time range)   Respiratory Care per Protocol (has no administration in time range)   NS (BOLUS) infusion 500 mL (has no administration in time range)   acetaminophen (TYLENOL) tablet 650 mg (has no administration in time range)   MD Alert...Vancomycin per Pharmacy (has no administration in time range)   cefepime (MAXIPIME) 2 g in  mL IVPB (has no administration in time range)   carvedilol (COREG) tablet 12.5 mg (has no administration in time range)   lacosamide (VIMPAT) tablet 200 mg (has no administration in time range)   levetiracetam (KEPPRA) TABS 2,000 mg (has no administration in time range)   lisinopril (PRINIVIL) 10 MG tablet 10 mg (has no administration in time range)   MD Alert...Warfarin per Pharmacy (has no administration in time range)   furosemide  (LASIX) injection 20 mg (has no administration in time range)   cefepime (MAXIPIME) 2 g in  mL IVPB (0 g Intravenous Stopped 8/7/19 0432)   lactated ringers infusion (BOLUS) (0 mL/kg × 83.5 kg Intravenous Stopped 8/7/19 0448)   fentaNYL (SUBLIMAZE) injection 100 mcg (100 mcg Intravenous Given 8/7/19 0356)   iohexol (OMNIPAQUE) 350 mg/mL (100 mL Intravenous Given 8/7/19 0533)   fentaNYL (SUBLIMAZE) injection 100 mcg (100 mcg Intravenous Given 8/7/19 0620)     FINAL IMPRESSION  1. Shortness of breath    2. Acute abdominal pain    3. Malignant gastrointestinal stromal tumor (GIST) of stomach (HCC)    4. Dehydration    5. Acute congestive heart failure, unspecified heart failure type (HCC)    6. Acute respiratory failure with hypoxia (HCC)    7. Community acquired pneumonia, unspecified laterality    8. Chronic anticoagulation    9. S/P mitral valve repair    10. LBBB (left bundle branch block)    11. Antiphospholipid syndrome (HCC)    12. Lactic acidosis        -ADMIT-       Pertinent Labs & Imaging studies reviewed and verified by myself, as well as nursing notes and the patient's past medical, family, and social histories (See chart for details).    Results, exam findings, clinical impression, presumed diagnosis, treatment options, and plan for admission were discussed with the patient and family, and they verbalized understanding, agreed with, and appreciated the plan of care.    Emilio CONLEY (Shelia), am scribing for, and in the presence of, Larry Burdick M.D..    Electronically signed by: Emilio Mccallum (Shelia), 8/7/2019    Larry CONLEY M.D. personally performed the services described in this documentation, as scribed by Emilio Mccallum in my presence, and it is both accurate and complete. C.    Portions of this record were made with voice recognition software.  Despite my review, spelling/grammar/context errors may still remain.  Interpretation of this chart should be taken in  this context.    The note accurately reflects work and decisions made by me.  Larry Burdick  8/7/2019  7:12 AM

## 2019-08-07 NOTE — H&P
Hospital Medicine History & Physical Note    Date of Service  8/7/2019    Primary Care Physician  Tai Fitch M.D.    Consultants  None    Code Status  Full code    Chief Complaint  Shortness of breath    History of Presenting Illness  64 y.o. male with a past medical history of congestive heart failure, mitral regurgitation status post mitral valve repair complicated by cardioembolic stroke, impaired cognition, antiphospholipid syndrome on Coumadin, epilepsy, GI stromal tumor who presented 8/7/2019 with shortness of breath for the past 2 days.  The patient was diagnosed with a GI stromal tumor 2 months ago after which she was started on Gleevec.  His oncologist is . The patient was unable to tolerate Gleevec after a month and a half of use due to symptoms of abdominal pain and diarrhea.  He was switched to Suttent 2 days ago and he developed symptoms of shortness of breath and cough.  He reports generalized weakness and fatigue.  He also has been having dry heaving and nausea.  He denies any chest pain.  He denies any fevers but endorses chills.  He denies any dysuria or diarrhea.    In the ER he was noted to be hypoxic and was placed on 2 L of oxygen    Troponin T 17, NT proBNP 2420    Chest x-ray interpreted by me reveals bilateral opacities and cardiomegaly.  Small right-sided pleural effusion is noted.    Review of Systems  Review of Systems   Constitutional: Positive for malaise/fatigue. Negative for chills, diaphoresis and fever.   HENT: Negative for hearing loss and sore throat.    Eyes: Negative for blurred vision.   Respiratory: Positive for cough, sputum production and shortness of breath. Negative for wheezing.    Cardiovascular: Negative for chest pain, palpitations and leg swelling.   Gastrointestinal: Positive for nausea. Negative for abdominal pain, blood in stool, diarrhea and vomiting.   Genitourinary: Negative for dysuria, flank pain and urgency.   Musculoskeletal: Negative for back  pain, joint pain, myalgias and neck pain.   Skin: Negative for rash.   Neurological: Negative for dizziness, focal weakness, seizures and headaches.   Endo/Heme/Allergies: Does not bruise/bleed easily.   Psychiatric/Behavioral: Negative for suicidal ideas.   All other systems reviewed and are negative.      Past Medical History   has a past medical history of Antiphospholipid syndrome ([facility]) (3/15/2010), ASTHMA, Cancer ([facility]) (2014), Chronic anticoagulation, Congestive heart failure ([facility]), CVA (cerebral vascular accident) ([facility]) (7/28/2015), Dental disorder, Epilepsy ([facility]) (5/14/2018), Hemorrhagic disorder ([facility]), Hypertension, LBBB (left bundle branch block), LV dysfunction (April 2015), Mitral regurgitation (April 2015), S/P mitral valve repair, Seizure disorder ([facility]) (2007), Snoring, TIA (transient ischemic attack) (2007/2008), and Tremors of nervous system.    Surgical History   has a past surgical history that includes inguinal hernia repair (4/20/2010); cholecystectomy; recovery (5/15/2015); and mitral valve repair (7/20/2015).     Family History  family history includes Cancer in his paternal uncle; Diabetes in his father; Heart Disease in his father and mother; Hypertension in his mother, paternal aunt, and sister; Lung Disease in his mother and paternal aunt; Psychiatric Illness in his maternal grandfather and sister.     Social History   reports that he quit smoking about 7 years ago. His smoking use included cigarettes. He started smoking about 34 years ago. He has a 37.00 pack-year smoking history. He has never used smokeless tobacco. He reports that he drinks alcohol. He reports that he does not use drugs.    Allergies  No Known Allergies    Medications  Prior to Admission Medications   Prescriptions Last Dose Informant Patient Reported? Taking?   Fexofenadine HCl (MUCINEX ALLERGY PO)   Yes No   Sig: Take  by mouth as needed.   calcium citrate (CALCITRATE) 950 MG Tab   Yes No   Sig: Take 950 mg by mouth  every day.   carvedilol (COREG) 12.5 MG Tab   No No   Sig: Take 1 Tab by mouth 2 times a day, with meals.   lacosamide (VIMPAT) 200 MG Tab tablet   No No   Sig: Take 200 mg by mouth 2 Times a Day for 181 days.   levetiracetam (KEPPRA) 1000 MG tablet   No No   Sig: Take 2 Tabs by mouth 2 Times a Day.   lisinopril (PRINIVIL) 10 MG Tab   No No   Sig: TAKE ONE TABLET BY MOUTH EVERY DAY   multivitamin (THERAGRAN) Tab   Yes No   Sig: Take 1 Tab by mouth every day.   warfarin (COUMADIN) 1 MG Tab   No No   Sig: TAKE TWO TO THREE TABLETS BY MOUTH DAILY AS DIRECTED BY [facility]      Facility-Administered Medications: None       Physical Exam  Temp:  [34.8 °C (94.6 °F)-36.5 °C (97.7 °F)] 36.5 °C (97.7 °F)  Pulse:  [] 98  Resp:  [18] 18  BP: (123-136)/(70-85) 123/79  SpO2:  [91 %-97 %] 95 %    Physical Exam   Constitutional: He is oriented to person, place, and time. He appears well-developed and well-nourished. No distress.   HENT:   Head: Normocephalic and atraumatic.   Mouth/Throat: Oropharynx is clear and moist.   Eyes: Pupils are equal, round, and reactive to light. Conjunctivae are normal. No scleral icterus.   Neck: Normal range of motion. Neck supple.   Cardiovascular: Regular rhythm and normal heart sounds.   Tachycardic   Pulmonary/Chest: Effort normal. He has no wheezes. He has rales.   Increased effort  Bilateral crackles   Abdominal: Soft. Bowel sounds are normal. He exhibits no distension. There is no tenderness. There is no rebound.   Musculoskeletal: Normal range of motion. He exhibits edema (Trace pedal edema bilaterally). He exhibits no tenderness.   Lymphadenopathy:     He has no cervical adenopathy.   Neurological: He is alert and oriented to person, place, and time. No cranial nerve deficit. Coordination normal.   Skin: Skin is warm. No rash noted.   Psychiatric: He has a normal mood and affect. His behavior is normal.   Nursing note and vitals reviewed.      Laboratory:  Recent  Labs     08/07/19  0336   WBC 12.5*   RBC 3.65*   HEMOGLOBIN 10.7*   HEMATOCRIT 32.4*   MCV 88.8   MCH 29.3   MCHC 33.0*   RDW 42.6   PLATELETCT 270   MPV 10.2     Recent Labs     08/07/19  0336   SODIUM 132*   POTASSIUM 4.7   CHLORIDE 98   CO2 22   GLUCOSE 186*   BUN 19   CREATININE 1.01   CALCIUM 8.4*     Recent Labs     08/07/19  0336   ALTSGPT 15   ASTSGOT 27   ALKPHOSPHAT 141*   TBILIRUBIN 1.8*   LIPASE 4*   GLUCOSE 186*     Recent Labs     08/05/19  0822 08/07/19  0336   APTT  --  70.3*   INR 2.10 1.47*     Recent Labs     08/07/19  0336   NTPROBNP 2420*         Recent Labs     08/07/19  0336   TROPONINT 17       Urinalysis:    No results found     Imaging:  CT-CTA CHEST PULMONARY ARTERY W/ RECONS   Final Result         1.  No evidence of pulmonary embolism.   2.  Cardiomegaly.   3.  Interlobular septal thickening most likely related to pulmonary edema. Patchy bilateral groundglass opacities also likely related to pulmonary edema. Superimposed infection is not excluded and clinical correlation is recommended. Small right and    trace left pleural effusions.   4.  Nonspecific mild mediastinal and hilar adenopathy which is mildly increased from prior.            CT-ABDOMEN-PELVIS WITH   Final Result         Significant increase in size of malignant mesenteric soft tissue mass in comparison to prior studies. No new metastatic disease is seen.      Findings within the lung bases as detailed on separate chest CT report.               DX-CHEST-PORTABLE (1 VIEW)   Final Result      Borderline cardiac silhouette enlargement. Bilateral surgical and perihilar opacities which most likely represent edema, with small right pleural effusion. Infection should be excluded clinically.      EC-ECHOCARDIOGRAM COMPLETE W/O CONT    (Results Pending)         Assessment/Plan:  I anticipate this patient will require at least two midnights for appropriate medical management, necessitating inpatient admission.    Sepsis  (HCC)  Assessment & Plan  This is severe sepsis with the following associated acute organ dysfunction(s): acute respiratory failure.   Likely source is pneumonia  Patient has been started on IV fluids per septic protocol  Lactate is elevated at 2.1  Patient is started on IV Vancomycin and Cefepime, monitor for vancomycin toxicity  Follow blood  cultures      Acute on chronic congestive heart failure (HCC)  Assessment & Plan  Previous echo on 5/19 reveals low normal EF of 50% and moderate mitral stenosis  proBNP elevated at 2420  I will start the patient on IV Lasix 20 mg daily  Check 2D echo  Fluid and salt restriction    CAP (community acquired pneumonia)- (present on admission)  Assessment & Plan  Patient was given a dose of IV vancomycin and IV cefepime in the ER.  This will be continued  Check pro calcitonin, if within normal limits we'll consider de-escalating antibiotics  RT protocol  Continue supplemental oxygen, wean as tolerated  Follow blood cultures      Acute respiratory failure with hypoxia (HCC)- (present on admission)  Assessment & Plan  Likely secondary to CHF exacerbation versus pneumonia  Started on 2 L of oxygen by nasal cannula  RT protocol    Gastrointestinal stromal tumor (GIST) (HCC)- (present on admission)  Assessment & Plan  Hold Suttent for concerns of adverse reaction  We will consider consulting with  for further recommendations    S/P mitral valve repair- (present on admission)  Assessment & Plan  Continue coumadin, monitor INR    Antiphospholipid syndrome (HCC)- (present on admission)  Assessment & Plan  Continue warfarin    Seizure (HCC)- (present on admission)  Assessment & Plan  Continue Keppra and Vimpat  Seizure precautions      VTE prophylaxis: Warfarin

## 2019-08-08 ENCOUNTER — APPOINTMENT (OUTPATIENT)
Dept: CARDIOLOGY | Facility: MEDICAL CENTER | Age: 64
DRG: 871 | End: 2019-08-08
Attending: INTERNAL MEDICINE
Payer: MEDICARE

## 2019-08-08 ENCOUNTER — APPOINTMENT (OUTPATIENT)
Dept: RADIOLOGY | Facility: MEDICAL CENTER | Age: 64
DRG: 871 | End: 2019-08-08
Attending: HOSPITALIST
Payer: MEDICARE

## 2019-08-08 LAB
ANION GAP SERPL CALC-SCNC: 10 MMOL/L (ref 0–11.9)
BASOPHILS # BLD AUTO: 0.2 % (ref 0–1.8)
BASOPHILS # BLD: 0.02 K/UL (ref 0–0.12)
BUN SERPL-MCNC: 25 MG/DL (ref 8–22)
CALCIUM SERPL-MCNC: 8 MG/DL (ref 8.5–10.5)
CHLORIDE SERPL-SCNC: 100 MMOL/L (ref 96–112)
CO2 SERPL-SCNC: 19 MMOL/L (ref 20–33)
CREAT SERPL-MCNC: 0.95 MG/DL (ref 0.5–1.4)
EOSINOPHIL # BLD AUTO: 0 K/UL (ref 0–0.51)
EOSINOPHIL NFR BLD: 0 % (ref 0–6.9)
ERYTHROCYTE [DISTWIDTH] IN BLOOD BY AUTOMATED COUNT: 41.2 FL (ref 35.9–50)
GLUCOSE SERPL-MCNC: 147 MG/DL (ref 65–99)
HCT VFR BLD AUTO: 27.6 % (ref 42–52)
HGB BLD-MCNC: 9.2 G/DL (ref 14–18)
IMM GRANULOCYTES # BLD AUTO: 0.04 K/UL (ref 0–0.11)
IMM GRANULOCYTES NFR BLD AUTO: 0.4 % (ref 0–0.9)
INR PPP: 1.93 (ref 0.87–1.13)
LV EJECT FRACT  99904: 15
LV EJECT FRACT MOD 2C 99903: 32.11
LV EJECT FRACT MOD 4C 99902: 36.87
LV EJECT FRACT MOD BP 99901: 40.08
LYMPHOCYTES # BLD AUTO: 1.19 K/UL (ref 1–4.8)
LYMPHOCYTES NFR BLD: 10.9 % (ref 22–41)
MCH RBC QN AUTO: 28.6 PG (ref 27–33)
MCHC RBC AUTO-ENTMCNC: 33.3 G/DL (ref 33.7–35.3)
MCV RBC AUTO: 85.7 FL (ref 81.4–97.8)
MONOCYTES # BLD AUTO: 0.77 K/UL (ref 0–0.85)
MONOCYTES NFR BLD AUTO: 7.1 % (ref 0–13.4)
NEUTROPHILS # BLD AUTO: 8.87 K/UL (ref 1.82–7.42)
NEUTROPHILS NFR BLD: 81.4 % (ref 44–72)
NRBC # BLD AUTO: 0 K/UL
NRBC BLD-RTO: 0 /100 WBC
PLATELET # BLD AUTO: 110 K/UL (ref 164–446)
PMV BLD AUTO: 11.1 FL (ref 9–12.9)
POTASSIUM SERPL-SCNC: 4.6 MMOL/L (ref 3.6–5.5)
PROTHROMBIN TIME: 22.7 SEC (ref 12–14.6)
RBC # BLD AUTO: 3.22 M/UL (ref 4.7–6.1)
SODIUM SERPL-SCNC: 129 MMOL/L (ref 135–145)
WBC # BLD AUTO: 10.9 K/UL (ref 4.8–10.8)

## 2019-08-08 PROCEDURE — 36415 COLL VENOUS BLD VENIPUNCTURE: CPT

## 2019-08-08 PROCEDURE — 85610 PROTHROMBIN TIME: CPT

## 2019-08-08 PROCEDURE — 700102 HCHG RX REV CODE 250 W/ 637 OVERRIDE(OP): Performed by: INTERNAL MEDICINE

## 2019-08-08 PROCEDURE — 700111 HCHG RX REV CODE 636 W/ 250 OVERRIDE (IP): Performed by: HOSPITALIST

## 2019-08-08 PROCEDURE — A9270 NON-COVERED ITEM OR SERVICE: HCPCS | Performed by: INTERNAL MEDICINE

## 2019-08-08 PROCEDURE — 99233 SBSQ HOSP IP/OBS HIGH 50: CPT | Performed by: HOSPITALIST

## 2019-08-08 PROCEDURE — 85025 COMPLETE CBC W/AUTO DIFF WBC: CPT

## 2019-08-08 PROCEDURE — 770004 HCHG ROOM/CARE - ONCOLOGY PRIVATE *

## 2019-08-08 PROCEDURE — 80048 BASIC METABOLIC PNL TOTAL CA: CPT

## 2019-08-08 PROCEDURE — A9270 NON-COVERED ITEM OR SERVICE: HCPCS | Performed by: HOSPITALIST

## 2019-08-08 PROCEDURE — 93306 TTE W/DOPPLER COMPLETE: CPT | Mod: 26 | Performed by: INTERNAL MEDICINE

## 2019-08-08 PROCEDURE — 700102 HCHG RX REV CODE 250 W/ 637 OVERRIDE(OP): Performed by: HOSPITALIST

## 2019-08-08 PROCEDURE — 700105 HCHG RX REV CODE 258: Performed by: HOSPITALIST

## 2019-08-08 PROCEDURE — 93306 TTE W/DOPPLER COMPLETE: CPT

## 2019-08-08 PROCEDURE — 700111 HCHG RX REV CODE 636 W/ 250 OVERRIDE (IP): Performed by: INTERNAL MEDICINE

## 2019-08-08 RX ORDER — FUROSEMIDE 20 MG/1
20 TABLET ORAL
Status: DISCONTINUED | OUTPATIENT
Start: 2019-08-09 | End: 2019-08-09

## 2019-08-08 RX ORDER — SPIRONOLACTONE 25 MG/1
25 TABLET ORAL
Status: DISCONTINUED | OUTPATIENT
Start: 2019-08-09 | End: 2019-08-09

## 2019-08-08 RX ORDER — SODIUM CHLORIDE 9 MG/ML
250 INJECTION, SOLUTION INTRAVENOUS PRN
Status: DISCONTINUED | OUTPATIENT
Start: 2019-08-08 | End: 2019-08-10 | Stop reason: HOSPADM

## 2019-08-08 RX ADMIN — WARFARIN SODIUM 1 MG: 1 TABLET ORAL at 17:43

## 2019-08-08 RX ADMIN — LACOSAMIDE 200 MG: 100 TABLET, FILM COATED ORAL at 04:44

## 2019-08-08 RX ADMIN — SENNOSIDES, DOCUSATE SODIUM 2 TABLET: 50; 8.6 TABLET, FILM COATED ORAL at 17:42

## 2019-08-08 RX ADMIN — FUROSEMIDE 20 MG: 10 INJECTION, SOLUTION INTRAMUSCULAR; INTRAVENOUS at 04:44

## 2019-08-08 RX ADMIN — OXYCODONE HYDROCHLORIDE 5 MG: 5 TABLET ORAL at 17:42

## 2019-08-08 RX ADMIN — LEVETIRACETAM 2000 MG: 500 TABLET, FILM COATED ORAL at 17:51

## 2019-08-08 RX ADMIN — VANCOMYCIN HYDROCHLORIDE 1200 MG: 500 INJECTION, POWDER, LYOPHILIZED, FOR SOLUTION INTRAVENOUS at 04:29

## 2019-08-08 RX ADMIN — CARVEDILOL 12.5 MG: 12.5 TABLET, FILM COATED ORAL at 17:42

## 2019-08-08 RX ADMIN — OXYCODONE HYDROCHLORIDE 5 MG: 5 TABLET ORAL at 12:58

## 2019-08-08 RX ADMIN — LACOSAMIDE 200 MG: 100 TABLET, FILM COATED ORAL at 17:42

## 2019-08-08 RX ADMIN — CEFTRIAXONE SODIUM 2 G: 2 INJECTION, POWDER, FOR SOLUTION INTRAMUSCULAR; INTRAVENOUS at 12:11

## 2019-08-08 RX ADMIN — LEVETIRACETAM 2000 MG: 500 TABLET, FILM COATED ORAL at 06:18

## 2019-08-08 ASSESSMENT — ENCOUNTER SYMPTOMS
SPEECH CHANGE: 0
DIAPHORESIS: 0
CLAUDICATION: 0
EYE DISCHARGE: 0
SPUTUM PRODUCTION: 0
FEVER: 0
SHORTNESS OF BREATH: 1
CHILLS: 0
PALPITATIONS: 0
DIZZINESS: 0
NECK PAIN: 0
NAUSEA: 0
BRUISES/BLEEDS EASILY: 0
EYE PAIN: 0
WHEEZING: 0
COUGH: 1
FOCAL WEAKNESS: 0
DIARRHEA: 0
SENSORY CHANGE: 0
HEMOPTYSIS: 0
WEAKNESS: 0
VOMITING: 0
LOSS OF CONSCIOUSNESS: 0
MYALGIAS: 0
ABDOMINAL PAIN: 1
BACK PAIN: 0
CONSTIPATION: 0
HEADACHES: 0
SORE THROAT: 0
DEPRESSION: 0

## 2019-08-08 ASSESSMENT — LIFESTYLE VARIABLES: SUBSTANCE_ABUSE: 0

## 2019-08-08 NOTE — PROGRESS NOTES
Received report from day shift RN. Assumed care of pt at 1900. Pt A&Ox4. Family at bedside. POC discussed. Pt reports no pain or discomfort at this time. Pt is up with 1 assist. Generalized weakness. R PIV patent, no blood return noted, infusing. Fall precautions in place. Bed locked in lowest position. Bed alarm on. Call light and belongings in reach. Hourly rounding in place. No further needs at this time.

## 2019-08-08 NOTE — PROGRESS NOTES
Inpatient Anticoagulation Service Note    Date: 8/8/2019    Reason for Anticoagulation: Stroke, Other (Comments), Mitral Valve Repair(antiphospholipid syndrome )   Target INR: 2.0 to 3.0         Hemoglobin Value: (!) 9.2  Hematocrit Value: (!) 27.6    INR from last 7 days     Date/Time INR Value    08/08/19 0002  (!) 1.93    08/07/19 0336  (!) 1.47        Dose from last 7 days     Date/Time Dose (mg)    08/08/19 0841  2    08/07/19 1440  1        Average Dose (mg): 1  Significant Interactions: Antibiotics  Bridge Therapy: Yes     Reversal Agent Administered: Not Applicable  Comments: Patient admitted for shortness of breath. Recent diagnosis of GI stromal tumor, previously on Gleevec, however switched to Sunitinib 2 days PTA. Patient is on warfarin for h/o CVA, mitral valve repair, and antiphospholipid syndrome and is followed by the [facility]. H/H/Plts trended down overnight (which may be dilutional). There are no overt s/sx of bleeding noted per chart review. Antibiotics initiated upon admission, no other warfarin-drug interactins noted at this time. INR remains sub-therapeutic but is trending towards goal after increased dose of warfarin 2 mg given yesterday.  Will resume home dose of warfarin 1 mg po daily tonight and will continue to trend INR.      Plan:  Resume home dose of warfarin 1 mg po daily tonight and will continue to trend INR. Will discuss continuation of enoxaparin with MD.   Education Material Provided?: No  Pharmacist suggested discharge dosing: Consider warfarin 1mg po qday with follow up to [facility] within 72 hours of discharge.      Carie Tyson, PharmD, BCOP

## 2019-08-08 NOTE — CONSULTS
DATE OF SERVICE:  08/08/2019    Consultation called by Dr. Owen.    REASON FOR CONSULTATION:  1.  Progressive GIST tumor.  2.  Difficulty tolerating Gleevec and Sutent.    HISTORY OF PRESENT ILLNESS:  The patient is a very pleasant 64-year-old   gentleman with past medical history of antiphospholipid antibody syndrome,   TIA, benign essential tremor, seizures, macular degeneration and prostate   cancer, who was diagnosed with a GIST tumor, which was fairly large and it   involved the retroperitoneum.  Biopsy confirmed the presence of GIST and the   patient was started on treatment with Gleevec on 06/12/2019.  This was poorly   tolerated by him and resulted in severe diarrhea and tremor.  Gleevec was   discontinued on 07/14/2019.  Subsequently, the patient was put on Sutent,   which was eventually started on 08/03/2019.  The patient then took another   dose on 08/04/2019 and tolerated extremely poorly and felt very fatigued.  He   was admitted to the hospital and it turns out that his cardiac ejection   fraction had dropped from 55% to 15%.  His troponins were found to be within   normal limits.  The patient is complaining of worsening bloating in his   abdomen and hem-onc consultation was called for further management of his   condition.    PAST MEDICAL HISTORY:  Carcinoma of the prostate status post radiation,   seizures, macular degeneration, benign essential tremor, history of TIA, CVA,   history of positivity for antiphospholipid antibody, GIST tumor.    PAST SURGICAL HISTORY:  Biopsy of GIST tumor, mitral valve repair,   cholecystectomy, hernia repair.    SOCIAL HISTORY:  Exsmoker, , wife also has uterine cancer, which is   terminal.  Social drinker.    FAMILY HISTORY:  No history of malignancy in the extended family.    REVIEW OF SYSTEMS:  GENERAL AND CONSTITUTIONAL:  Complaining of fatigue.  No fevers or chills.  HEAD, NECK, EARS, NOSE AND THROAT:  Denies any headaches or visual  symptoms.  RESPIRATORY:  Denies any cough or hemoptysis.  CARDIOVASCULAR:  See history of present illness.  Denies any palpitations.  No   chest pain.  GASTROINTESTINAL:  See history of present illness.  Complains of abdominal   bloating.  No nausea or vomiting.  GENITOURINARY:  Denies any dysuria or hematuria.  He has a history of prostate   cancer status post radiation therapy.  SKIN AND INTEGUMENTARY:  No rash or any bruising.  ENDOCRINE:  No dyslipidemia or any thyroid problems.  NEUROLOGIC:  He has history of seizures, but no re-seizures have occurred   recently.  Had a TIA, but denies any weakness on the side.  PSYCHIATRIC:  Anxious, but denies any depression.  Rest of review of systems   is negative per CMS/AMA criteria unless as mentioned in history of present or   past illness.    PHYSICAL EXAMINATION:  VITAL SIGNS:  Vitals are stable.  HEENT:  Pupils are equal.  There is no icterus.  Conjunctiva is normal.  NECK:  Supple with no JVD or lymphadenopathy.  LUNGS:  Clear to auscultation.  No basal rales were noted.  HEART:  Reveals he appears to be in sinus rhythm.  There is no murmur.  ABDOMEN:  Reveals fullness in the upper abdomen mainly.  There is also some   possible ascites.  There is a mass centered just around the superior part of   umbilicus, which appears to be fairly large.  Liver and spleen could not be   palpated.  Hernial sites appear normal.  There is +1 bilateral lower extremity   edema.  NEUROLOGIC:  Power is bilaterally equal and is 5/5.  Cranial nerves appear   intact.  SKIN:  Reveals no rash or any bruising.    ASSESSMENT AND PLAN:  Gastrointestinal stromal tumor.  The patient has a   fairly large gastrointestinal stromal tumor, which has progressed as compared   to April/May scans and PET scan.  Initially, he appeared to have responded to   gastrointestinal stromal tumor with improvement while on Gleevec with   improvement on his symptoms being noted.  However, the Gleevec was    discontinued in July because of severe side effects that caused.  Patient is   unable to tolerate Sutent; his last dose of Sutent was 50 mg.  Unfortunately,   his cardiac ejection fraction has also dropped markedly.  It is not clear if   this was related to Sutent therapy or not.  However, my plan is to continue to   hold of Sutent therapy.  From the cardiac standpoint, we will have cardiology   evaluate the patient so that his medications may be optimized.  I reviewed   his echocardiogram results with him.  I explained to the patient that   considering the fact how poor his cardiac function is that might become a   bigger issue as far as his longevity is concerned, the patient is very anxious   since he is his wife's caregiver who has terminal uterine cancer and has been   given an expected lifespan of about 3 months.  The patient will follow up in   our office.  Lukas garcía       ____________________________________     Tejvir MD CEE Gao / JAYA    DD:  08/08/2019 16:18:32  DT:  08/08/2019 16:52:23    D#:  [id number]  Job#:  [id number]

## 2019-08-08 NOTE — RESPIRATORY CARE
COPD EDUCATION by COPD CLINICAL EDUCATOR  8/8/2019 at 8:16 AM by Ruthy Lange     Patient reviewed by COPD education team. Patient does not have a history or diagnosis of COPD and is a non-smoker, therefore does not qualify for the COPD program.

## 2019-08-08 NOTE — PROGRESS NOTES
"Patient seen, examined, chart reviewed.  Added oxycodone prn pain.    States has abdominal fullness from the increase in GI stromal tumor, decreased appetite due to \"fullness\"  Of mass of mesentery.  Currently on RA after IV lasix given.  Agree with covering for underlying pneumonia due to elevated wbc, elevated procalcitonin and hypothermia.    Ordered lovenox 1mg/kg q 12 until INR >1.5, remains on coumadin.  Unclear if mild CHF side effect of chemotherapy agent Suttent vs. Early infectious infiltrates bilaterally.  ANC normal.      "

## 2019-08-08 NOTE — DIETARY
"Nutrition services: Day 1 of admit.  Roque Nguyen is a 64 y.o. male with admitting DX of SOB, Sepsis, Community acquired Pneumonia  History includes: Antiphospholipid syndrome, ASTHMA, GI Cancer, Chronic anticoagulation, Congestive heart failure, CVA, Epilepsy, Hypertension,  TIA (transient ischemic attack  Weight loss and poor PO intake noted on admit screen.    Patient reported he lost 20# in the past three weeks.  He stated he feels visible weaker and still doesn't feel like eating anything.  He is receiving Boost TID, but asked if we could discontinue it as he doesn't like it.  He did not want to replace it with another snack or supplement at this time.    Assessment:  Height: 167.6 cm (5' 6\")  Weight: 80.1 kg (176 lb 9.4 oz)  Body mass index is 28.5 kg/m². Overweight.  Diet/Intake: Cardiac, 2 gm sodium    Evaluation:   1. Pertinent Labs today:  Na 146, BUN 25  2. 10% weight loss; reduced muscle mass per patient    Malnutrition Risk: Moderate malnutrition in the setting of acute (on chronic)illness.    Recommendations/Inteventions/Plan:  1. Encourage intake of meals  2. Document intake of all PO as % taken in ADL's to provide interdisciplinary communication across all shifts.   3. Monitor weight.  4. Nutrition rep will continue to see patient for ongoing meal and snack preferences.     RD following.      "

## 2019-08-08 NOTE — CARE PLAN
Problem: Knowledge Deficit  Goal: Knowledge of the prescribed therapeutic regimen will improve  Outcome: PROGRESSING AS EXPECTED  Intervention: Discuss information regarding therpeutic regimen and document in education  Note:   Pt educated on pathology of pneumonia. Provided IS @ bedside. Educated pt on use, 10x/hr while awake.      Problem: Pain Management  Goal: Pain level will decrease to patient's comfort goal  Outcome: PROGRESSING AS EXPECTED  Note:   Pt has oxy 5 mg on board

## 2019-08-08 NOTE — PROGRESS NOTES
Hospital Medicine Daily Progress Note    Date of Service  8/8/2019    Chief Complaint   SOB    Hospital Course    8/8:   This 64 y.o. 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 Dupont, failed Gleevec due to diarrheal symptoms, started on 2 doses of Suttent 15mg.  He was admitted with CHF symtpoms of severe SOB almost immediately after taking his second dose of Sutent with elevated BNP 2400, responded to IV lasix.  His echo had decreased in EF from 50% to 15%.  He was also being treated for pneumonia since wbc and procalcitonin elevated.  He was downgraded from vanco, cefepime to Rocephin IV. Cardiology was consulted for new onset CHF.  Dr. Gao consulted for increase in size of mesenteric tumor on CT abdomen.  He states that Suttent does have cardiotoxicity and that all chemo will be stopped until cardiac function improves.    *        Consultants/Specialty  Dr. Gao  Cardiology new onset CHF.    Code Status  full    Disposition  Home once medically cleared.  Will need CHF follow up.    Review of Systems  Review of Systems   Constitutional: Negative for chills, diaphoresis, fever and malaise/fatigue.   HENT: Negative for congestion and sore throat.    Eyes: Negative for pain and discharge.   Respiratory: Positive for cough and shortness of breath. Negative for hemoptysis, sputum production and wheezing.    Cardiovascular: Negative for chest pain, palpitations, claudication and leg swelling.   Gastrointestinal: Positive for abdominal pain (abdominal fullness). Negative for constipation, diarrhea, melena, nausea and vomiting.   Genitourinary: Negative for dysuria, frequency and urgency.   Musculoskeletal: Negative for back pain, joint pain, myalgias and neck pain.   Skin: Negative for itching and rash.   Neurological: Negative for dizziness, sensory change, speech change, focal weakness, loss of  consciousness, weakness and headaches.   Endo/Heme/Allergies: Does not bruise/bleed easily.   Psychiatric/Behavioral: Negative for depression, substance abuse and suicidal ideas.        Physical Exam  Temp:  [36.2 °C (97.1 °F)-36.9 °C (98.4 °F)] 36.2 °C (97.1 °F)  Pulse:  [100-107] 100  Resp:  [18] 18  BP: ()/(48-80) 100/48  SpO2:  [87 %-97 %] 97 %    Physical Exam   Constitutional: He is oriented to person, place, and time. He appears well-developed and well-nourished. No distress.   HENT:   Head: Normocephalic and atraumatic.   Mouth/Throat: Oropharynx is clear and moist. No oropharyngeal exudate.   Eyes: Pupils are equal, round, and reactive to light. Conjunctivae and EOM are normal. Right eye exhibits no discharge. Left eye exhibits no discharge. No scleral icterus.   Neck: Normal range of motion. Neck supple. No JVD present. No tracheal deviation present. No thyromegaly present.   Cardiovascular: Normal rate, regular rhythm and normal heart sounds. Exam reveals no gallop and no friction rub.   No murmur heard.  Pulmonary/Chest: Effort normal and breath sounds normal. No respiratory distress. He has no wheezes. He has no rales. He exhibits no tenderness.   Lungs CTA b/l s/p diuresis.  Not further orthopnea.   Abdominal: Soft. Bowel sounds are normal. He exhibits no distension and no mass. There is no tenderness. There is no rebound and no guarding.   Musculoskeletal: Normal range of motion. He exhibits no edema or tenderness.   Lymphadenopathy:     He has no cervical adenopathy.   Neurological: He is alert and oriented to person, place, and time. No cranial nerve deficit. He exhibits normal muscle tone.   Skin: Skin is warm and dry. No rash noted. He is not diaphoretic. No erythema.   Psychiatric: He has a normal mood and affect. His behavior is normal. Judgment and thought content normal.   Nursing note and vitals reviewed.      Fluids    Intake/Output Summary (Last 24 hours) at 8/8/2019 1630  Last data  filed at 8/8/2019 0500  Gross per 24 hour   Intake --   Output 600 ml   Net -600 ml       Laboratory  Recent Labs     08/07/19  0336 08/08/19  0002   WBC 12.5* 10.9*   RBC 3.65* 3.22*   HEMOGLOBIN 10.7* 9.2*   HEMATOCRIT 32.4* 27.6*   MCV 88.8 85.7   MCH 29.3 28.6   MCHC 33.0* 33.3*   RDW 42.6 41.2   PLATELETCT 270 110*   MPV 10.2 11.1     Recent Labs     08/07/19  0336 08/08/19  0002   SODIUM 132* 129*   POTASSIUM 4.7 4.6   CHLORIDE 98 100   CO2 22 19*   GLUCOSE 186* 147*   BUN 19 25*   CREATININE 1.01 0.95   CALCIUM 8.4* 8.0*     Recent Labs     08/07/19 0336 08/08/19  0002   APTT 70.3*  --    INR 1.47* 1.93*               Imaging  EC-ECHOCARDIOGRAM COMPLETE W/O CONT   Final Result      IR-US GUIDED PIV   Final Result    Ultrasound-guided PERIPHERAL IV INSERTION performed by    qualified nursing staff as above.            CT-CTA CHEST PULMONARY ARTERY W/ RECONS   Final Result         1.  No evidence of pulmonary embolism.   2.  Cardiomegaly.   3.  Interlobular septal thickening most likely related to pulmonary edema. Patchy bilateral groundglass opacities also likely related to pulmonary edema. Superimposed infection is not excluded and clinical correlation is recommended. Small right and    trace left pleural effusions.   4.  Nonspecific mild mediastinal and hilar adenopathy which is mildly increased from prior.            CT-ABDOMEN-PELVIS WITH   Final Result         Significant increase in size of malignant mesenteric soft tissue mass in comparison to prior studies. No new metastatic disease is seen.      Findings within the lung bases as detailed on separate chest CT report.               DX-CHEST-PORTABLE (1 VIEW)   Final Result      Borderline cardiac silhouette enlargement. Bilateral surgical and perihilar opacities which most likely represent edema, with small right pleural effusion. Infection should be excluded clinically.           Assessment/Plan  Antiphospholipid syndrome (HCC)- (present on  admission)  Assessment & Plan  Continue warfarin    Seizure (HCC)- (present on admission)  Assessment & Plan  Continue Keppra and Vimpat  Seizure precautions    Sepsis (HCC)- (present on admission)  Assessment & Plan  This is severe sepsis with the following associated acute organ dysfunction(s): acute respiratory failure.   Likely source is pneumonia  DC'd IV fluids.  Lactate is elevated at 2.1 and trending down.  Discontinued IV Vancomycin and Cefepime, de-escalate it to Rocephin IV x5 days.  Follow blood  cultures      Acute on chronic congestive heart failure (HCC)  Assessment & Plan  Previous echo on 5/19 reveals low normal EF of 50% and moderate mitral stenosis  proBNP elevated at 2420   IV Lasix 20 mg daily.  Repeat 2D echo with reduced EF 15%.  Fluid and salt restriction  Cardiology consult for new onset CHF to 15.  Likely secondary to cardiotoxic effects of Sutent chemotherapy  Spironolactone ACE inhibitor beta-blocker started.  Patient already on Coumadin.    CAP (community acquired pneumonia)- (present on admission)  Assessment & Plan  Patient was given a dose of IV vancomycin and IV cefepime in the ER.  Discontinue vancomycin IV.  Change cefepime to Rocephin IV for 5-day total treatment.  Elevated pro calcitonin  MRSA screening negative  RT protocol  Continue supplemental oxygen, wean as tolerated  Follow blood cultures      Acute respiratory failure with hypoxia (HCC)- (present on admission)  Assessment & Plan  Likely secondary to CHF exacerbation versus pneumonia  Started on 2 L of oxygen by nasal cannula  RT protocol    Gastrointestinal stromal tumor (GIST) (HCC)- (present on admission)  Assessment & Plan  Hold Suttent for concerns of adverse reaction  Dr. 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  However given new onset of severe CHF, Dr. Gao states will need to hold all chemotherapy at this time.  Dr. Gao requesting cardiology consult for EF 15%.    S/P  mitral valve repair- (present on admission)  Assessment & Plan  Continue coumadin, monitor INR       VTE prophylaxis: coumadin

## 2019-08-08 NOTE — PROGRESS NOTES
Assumed care of pt @ 0700. Upx1. A&Ox4. Denies pain, N/V, numbness, tingling. Pt IV leaking with s/s of infiltration. Fluids stopped & IV removed. US guided PIV placed for abx therapy. POC discussed with pt. ECHO to happen later this afternoon. All needs met. Bed alarm on, bed locked & in lowest position. Call light & belongings within reach.

## 2019-08-08 NOTE — CARE PLAN
Problem: Communication  Goal: The ability to communicate needs accurately and effectively will improve  Outcome: PROGRESSING AS EXPECTED  Note:   Pt calls appropriately for needs. Call light within reach. Hourly rounding in place.      Problem: Safety  Goal: Will remain free from falls  Outcome: PROGRESSING AS EXPECTED  Note:   Bed alarm on. Bed locked in lowest position. Fall precautions in place. Pt educated to call for assistance.

## 2019-08-09 LAB
ANION GAP SERPL CALC-SCNC: 10 MMOL/L (ref 0–11.9)
BASOPHILS # BLD AUTO: 0.1 % (ref 0–1.8)
BASOPHILS # BLD: 0.01 K/UL (ref 0–0.12)
BUN SERPL-MCNC: 25 MG/DL (ref 8–22)
CALCIUM SERPL-MCNC: 8 MG/DL (ref 8.5–10.5)
CHLORIDE SERPL-SCNC: 100 MMOL/L (ref 96–112)
CO2 SERPL-SCNC: 21 MMOL/L (ref 20–33)
CREAT SERPL-MCNC: 1.06 MG/DL (ref 0.5–1.4)
EOSINOPHIL # BLD AUTO: 0.02 K/UL (ref 0–0.51)
EOSINOPHIL NFR BLD: 0.2 % (ref 0–6.9)
ERYTHROCYTE [DISTWIDTH] IN BLOOD BY AUTOMATED COUNT: 41.8 FL (ref 35.9–50)
GLUCOSE SERPL-MCNC: 114 MG/DL (ref 65–99)
HCT VFR BLD AUTO: 26 % (ref 42–52)
HGB BLD-MCNC: 8.5 G/DL (ref 14–18)
IMM GRANULOCYTES # BLD AUTO: 0.04 K/UL (ref 0–0.11)
IMM GRANULOCYTES NFR BLD AUTO: 0.4 % (ref 0–0.9)
INR PPP: 2.05 (ref 0.87–1.13)
LYMPHOCYTES # BLD AUTO: 1.14 K/UL (ref 1–4.8)
LYMPHOCYTES NFR BLD: 11.4 % (ref 22–41)
MCH RBC QN AUTO: 28.1 PG (ref 27–33)
MCHC RBC AUTO-ENTMCNC: 32.7 G/DL (ref 33.7–35.3)
MCV RBC AUTO: 86.1 FL (ref 81.4–97.8)
MONOCYTES # BLD AUTO: 0.69 K/UL (ref 0–0.85)
MONOCYTES NFR BLD AUTO: 6.9 % (ref 0–13.4)
NEUTROPHILS # BLD AUTO: 8.11 K/UL (ref 1.82–7.42)
NEUTROPHILS NFR BLD: 81 % (ref 44–72)
NRBC # BLD AUTO: 0 K/UL
NRBC BLD-RTO: 0 /100 WBC
NT-PROBNP SERPL IA-MCNC: ABNORMAL PG/ML (ref 0–125)
PLATELET # BLD AUTO: 138 K/UL (ref 164–446)
PMV BLD AUTO: 11.1 FL (ref 9–12.9)
POTASSIUM SERPL-SCNC: 4.3 MMOL/L (ref 3.6–5.5)
PROTHROMBIN TIME: 23.8 SEC (ref 12–14.6)
RBC # BLD AUTO: 3.02 M/UL (ref 4.7–6.1)
SODIUM SERPL-SCNC: 131 MMOL/L (ref 135–145)
WBC # BLD AUTO: 10 K/UL (ref 4.8–10.8)

## 2019-08-09 PROCEDURE — A9270 NON-COVERED ITEM OR SERVICE: HCPCS | Performed by: INTERNAL MEDICINE

## 2019-08-09 PROCEDURE — 99223 1ST HOSP IP/OBS HIGH 75: CPT | Performed by: INTERNAL MEDICINE

## 2019-08-09 PROCEDURE — 700111 HCHG RX REV CODE 636 W/ 250 OVERRIDE (IP): Performed by: HOSPITALIST

## 2019-08-09 PROCEDURE — 36415 COLL VENOUS BLD VENIPUNCTURE: CPT

## 2019-08-09 PROCEDURE — 700102 HCHG RX REV CODE 250 W/ 637 OVERRIDE(OP): Performed by: HOSPITALIST

## 2019-08-09 PROCEDURE — 80048 BASIC METABOLIC PNL TOTAL CA: CPT

## 2019-08-09 PROCEDURE — A9270 NON-COVERED ITEM OR SERVICE: HCPCS | Performed by: HOSPITALIST

## 2019-08-09 PROCEDURE — 83880 ASSAY OF NATRIURETIC PEPTIDE: CPT

## 2019-08-09 PROCEDURE — 85025 COMPLETE CBC W/AUTO DIFF WBC: CPT

## 2019-08-09 PROCEDURE — 770004 HCHG ROOM/CARE - ONCOLOGY PRIVATE *

## 2019-08-09 PROCEDURE — 700105 HCHG RX REV CODE 258: Performed by: HOSPITALIST

## 2019-08-09 PROCEDURE — 99233 SBSQ HOSP IP/OBS HIGH 50: CPT | Performed by: HOSPITALIST

## 2019-08-09 PROCEDURE — 700102 HCHG RX REV CODE 250 W/ 637 OVERRIDE(OP): Performed by: INTERNAL MEDICINE

## 2019-08-09 PROCEDURE — 85610 PROTHROMBIN TIME: CPT

## 2019-08-09 RX ORDER — SPIRONOLACTONE 25 MG/1
12.5 TABLET ORAL
Status: DISCONTINUED | OUTPATIENT
Start: 2019-08-10 | End: 2019-08-10 | Stop reason: HOSPADM

## 2019-08-09 RX ORDER — AMOXICILLIN AND CLAVULANATE POTASSIUM 875; 125 MG/1; MG/1
1 TABLET, FILM COATED ORAL EVERY 12 HOURS
Status: DISCONTINUED | OUTPATIENT
Start: 2019-08-10 | End: 2019-08-10 | Stop reason: HOSPADM

## 2019-08-09 RX ORDER — LISINOPRIL 2.5 MG/1
2.5 TABLET ORAL DAILY
Status: DISCONTINUED | OUTPATIENT
Start: 2019-08-10 | End: 2019-08-10 | Stop reason: HOSPADM

## 2019-08-09 RX ADMIN — LACOSAMIDE 200 MG: 100 TABLET, FILM COATED ORAL at 17:41

## 2019-08-09 RX ADMIN — LEVETIRACETAM 2000 MG: 500 TABLET, FILM COATED ORAL at 04:54

## 2019-08-09 RX ADMIN — LACOSAMIDE 200 MG: 100 TABLET, FILM COATED ORAL at 04:54

## 2019-08-09 RX ADMIN — WARFARIN SODIUM 1 MG: 1 TABLET ORAL at 17:41

## 2019-08-09 RX ADMIN — CEFTRIAXONE SODIUM 2 G: 2 INJECTION, POWDER, FOR SOLUTION INTRAMUSCULAR; INTRAVENOUS at 04:56

## 2019-08-09 RX ADMIN — OXYCODONE HYDROCHLORIDE 5 MG: 5 TABLET ORAL at 18:26

## 2019-08-09 RX ADMIN — LEVETIRACETAM 2000 MG: 500 TABLET, FILM COATED ORAL at 17:41

## 2019-08-09 RX ADMIN — SENNOSIDES, DOCUSATE SODIUM 2 TABLET: 50; 8.6 TABLET, FILM COATED ORAL at 04:54

## 2019-08-09 RX ADMIN — SENNOSIDES, DOCUSATE SODIUM 2 TABLET: 50; 8.6 TABLET, FILM COATED ORAL at 17:41

## 2019-08-09 ASSESSMENT — ENCOUNTER SYMPTOMS
HEADACHES: 0
DIAPHORESIS: 0
SPUTUM PRODUCTION: 0
CLAUDICATION: 0
CHILLS: 0
WEAKNESS: 0
EYE PAIN: 0
DIZZINESS: 0
VOMITING: 0
WHEEZING: 0
NECK PAIN: 0
FOCAL WEAKNESS: 0
SENSORY CHANGE: 0
BRUISES/BLEEDS EASILY: 0
SPEECH CHANGE: 0
MYALGIAS: 0
ABDOMINAL PAIN: 1
DIARRHEA: 0
NAUSEA: 0
EYE DISCHARGE: 0
BACK PAIN: 0
SORE THROAT: 0
CONSTIPATION: 0
LOSS OF CONSCIOUSNESS: 0
HEMOPTYSIS: 0
PALPITATIONS: 0
COUGH: 0
DEPRESSION: 0
FEVER: 0
SHORTNESS OF BREATH: 0

## 2019-08-09 ASSESSMENT — LIFESTYLE VARIABLES: SUBSTANCE_ABUSE: 0

## 2019-08-09 NOTE — DIETARY
Nutrition Services:  Order for heart healthy diet education.  Patient with poor PO intake.  Discussed the importance of limiting salt when his intake is good, but when PO intake is poor, he will be self limiting sodium.  Heart failure booklet to be given to patient per nursing which includes low sodium instructions.    RD following and available PRN.

## 2019-08-09 NOTE — PROGRESS NOTES
Hospital Medicine Daily Progress Note    Date of Service  8/9/2019    Chief Complaint   SOB    Hospital Course    8/8:   This 64 y.o. 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 Dupont, failed Gleevec due to diarrheal symptoms, started on 2 doses of Suttent 15mg.  He was admitted with CHF symtpoms of severe SOB almost immediately after taking his second dose of Sutent with elevated BNP 2400, responded to IV lasix.  His echo had decreased in EF from 50% to 15%.  He was also being treated for pneumonia since wbc and procalcitonin elevated.  He was downgraded from vanco, cefepime to Rocephin IV. Cardiology was consulted for new onset CHF.  Dr. Gao consulted for increase in size of mesenteric tumor on CT abdomen.  He states that Suttent does have cardiotoxicity and that all chemo will be stopped until cardiac function improves.   8/9: Patient seen.  He appeared somewhat depressed lying in bed today which is completely different from yesterday.  He is quite depressed about his decreased heart function from 50% to 15%.  He did meet with cardiology Dr. Alarcon today who plans to repeat the echocardiogram in 3 months time.  The patient states he is getting some help from friends to help with his wife who is actively dying from uterine cancer.  Unfortunately patient's blood pressure 90/60 and therefore his Coreg was held as well as his lisinopril.  I have decreased Pranactin to 12.5 mill grams daily decreased his lisinopril to 2.5 mg daily continue Coreg 12.5 twice daily as tolerated with holding parameters hold for systolic blood pressure less than 90.  This was all discussed with Dr. Alarcon by phone.  INR is therapeutic.  Per Dr. Alarcon mitral valve is showing signs of stenosis.   *        Consultants/Specialty  Dr. Gao  Cardiology new onset CHF.    Code Status  full    Disposition  Home once medically  cleared.  Will need CHF follow up in 1 week and repeat echo in 3 months.  Weaned to room air.    Review of Systems  Review of Systems   Constitutional: Negative for chills, diaphoresis, fever and malaise/fatigue.   HENT: Negative for congestion and sore throat.    Eyes: Negative for pain and discharge.   Respiratory: Negative for cough, hemoptysis, sputum production, shortness of breath and wheezing.    Cardiovascular: Negative for chest pain, palpitations, claudication and leg swelling.   Gastrointestinal: Positive for abdominal pain (abdominal fullness). Negative for constipation, diarrhea, melena, nausea and vomiting.   Genitourinary: Negative for dysuria, frequency and urgency.   Musculoskeletal: Negative for back pain, joint pain, myalgias and neck pain.   Skin: Negative for itching and rash.   Neurological: Negative for dizziness, sensory change, speech change, focal weakness, loss of consciousness, weakness and headaches.   Endo/Heme/Allergies: Does not bruise/bleed easily.   Psychiatric/Behavioral: Negative for depression, substance abuse and suicidal ideas.        Physical Exam  Temp:  [36.1 °C (97 °F)-37.2 °C (98.9 °F)] 36.7 °C (98.1 °F)  Pulse:  [102-117] 117  Resp:  [16-18] 18  BP: ()/(53-74) 101/66  SpO2:  [90 %-96 %] 94 %    Physical Exam   Constitutional: He is oriented to person, place, and time. He appears well-developed and well-nourished. No distress.   HENT:   Head: Normocephalic and atraumatic.   Mouth/Throat: Oropharynx is clear and moist. No oropharyngeal exudate.   Eyes: Pupils are equal, round, and reactive to light. Conjunctivae and EOM are normal. Right eye exhibits no discharge. Left eye exhibits no discharge. No scleral icterus.   Neck: Normal range of motion. Neck supple. No JVD present. No tracheal deviation present. No thyromegaly present.   Cardiovascular: Normal rate, regular rhythm and normal heart sounds. Exam reveals no gallop and no friction rub.   No murmur  heard.  Pulmonary/Chest: Effort normal and breath sounds normal. No respiratory distress. He has no wheezes. He has no rales. He exhibits no tenderness.   Lungs CTA b/l s/p diuresis.  No further orthopnea.   Abdominal: Soft. Bowel sounds are normal. He exhibits no distension and no mass. There is no tenderness. There is no rebound and no guarding.   Musculoskeletal: Normal range of motion. He exhibits no edema or tenderness.   Lymphadenopathy:     He has no cervical adenopathy.   Neurological: He is alert and oriented to person, place, and time. No cranial nerve deficit. He exhibits normal muscle tone.   Skin: Skin is warm and dry. No rash noted. He is not diaphoretic. No erythema.   Psychiatric: He has a normal mood and affect. His behavior is normal. Judgment and thought content normal.   Nursing note and vitals reviewed.      Fluids    Intake/Output Summary (Last 24 hours) at 8/9/2019 1646  Last data filed at 8/9/2019 1213  Gross per 24 hour   Intake 710 ml   Output --   Net 710 ml       Laboratory  Recent Labs     08/07/19 0336 08/08/19  0002 08/09/19  0014   WBC 12.5* 10.9* 10.0   RBC 3.65* 3.22* 3.02*   HEMOGLOBIN 10.7* 9.2* 8.5*   HEMATOCRIT 32.4* 27.6* 26.0*   MCV 88.8 85.7 86.1   MCH 29.3 28.6 28.1   MCHC 33.0* 33.3* 32.7*   RDW 42.6 41.2 41.8   PLATELETCT 270 110* 138*   MPV 10.2 11.1 11.1     Recent Labs     08/07/19 0336 08/08/19  0002 08/09/19  0014   SODIUM 132* 129* 131*   POTASSIUM 4.7 4.6 4.3   CHLORIDE 98 100 100   CO2 22 19* 21   GLUCOSE 186* 147* 114*   BUN 19 25* 25*   CREATININE 1.01 0.95 1.06   CALCIUM 8.4* 8.0* 8.0*     Recent Labs     08/07/19  0336 08/08/19  0002 08/09/19  0014   APTT 70.3*  --   --    INR 1.47* 1.93* 2.05*               Imaging  EC-ECHOCARDIOGRAM COMPLETE W/O CONT   Final Result      IR-US GUIDED PIV   Final Result    Ultrasound-guided PERIPHERAL IV INSERTION performed by    qualified nursing staff as above.            CT-CTA CHEST PULMONARY ARTERY W/ RECONS   Final  Result         1.  No evidence of pulmonary embolism.   2.  Cardiomegaly.   3.  Interlobular septal thickening most likely related to pulmonary edema. Patchy bilateral groundglass opacities also likely related to pulmonary edema. Superimposed infection is not excluded and clinical correlation is recommended. Small right and    trace left pleural effusions.   4.  Nonspecific mild mediastinal and hilar adenopathy which is mildly increased from prior.            CT-ABDOMEN-PELVIS WITH   Final Result         Significant increase in size of malignant mesenteric soft tissue mass in comparison to prior studies. No new metastatic disease is seen.      Findings within the lung bases as detailed on separate chest CT report.               DX-CHEST-PORTABLE (1 VIEW)   Final Result      Borderline cardiac silhouette enlargement. Bilateral surgical and perihilar opacities which most likely represent edema, with small right pleural effusion. Infection should be excluded clinically.           Assessment/Plan  Antiphospholipid syndrome (HCC)- (present on admission)  Assessment & Plan  Continue warfarin    Seizure (HCC)- (present on admission)  Assessment & Plan  Continue Keppra and Vimpat  Seizure precautions    Sepsis (HCC)- (present on admission)  Assessment & Plan  This is severe sepsis with the following associated acute organ dysfunction(s): acute respiratory failure.   Likely source is pneumonia  DC'd IV fluids.  Lactate is elevated at 2.1 and trending down.  Discontinued IV Vancomycin and Cefepime, de-escalate it to Rocephin IV x5 days.  Follow blood  cultures      Acute on chronic congestive heart failure (HCC)  Assessment & Plan  Previous echo on 5/19 reveals low normal EF of 50% and moderate mitral stenosis  proBNP elevated at 2420   IV Lasix 20 mg daily.  Repeat 2D echo with reduced EF 15%.  Fluid and salt restriction  Cardiology consult for new onset CHF to 15.  Likely secondary to cardiotoxic effects of Sutent  chemotherapy  Spironolactone ACE inhibitor beta-blocker started.  Patient already on Coumadin.  She has not been able to tolerate his ACE inhibitor or Coreg due to systolic blood pressure less than 100.  I did change parameters to systolic blood pressure less than 90.  Decrease the spironolactone 25 to 12.5 mg daily.  Discontinue Lasix.  Decreased ACE inhibitor from 10 to 2.5 mg daily continue Coreg as tolerated 12.5 twice daily.  This was all discussed with Dr. Alarcon cardiology.  Plan is to repeat echocardiogram in 3 months to check on EF.  At that point they will consider pacemaker.    CAP (community acquired pneumonia)- (present on admission)  Assessment & Plan  Patient was given a dose of IV vancomycin and IV cefepime in the ER.  Discontinue vancomycin IV.  Change cefepime to Rocephin IV/augmentin for 5-day total treatment.  Elevated pro calcitonin  MRSA screening negative  RT protocol  Continue supplemental oxygen, wean as tolerated  Follow blood cultures      Acute respiratory failure with hypoxia (HCC)- (present on admission)  Assessment & Plan  Likely secondary to CHF exacerbation versus pneumonia  Started on 2 L of oxygen by nasal cannula  RT protocol    Gastrointestinal stromal tumor (GIST) (HCC)- (present on admission)  Assessment & Plan  Hold Suttent for concerns of adverse reaction  Dr. 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  However given new onset of severe CHF, Dr. Gao states will need to hold all chemotherapy at this time.  Dr. Gao requesting cardiology consult for EF 15%.    S/P mitral valve repair- (present on admission)  Assessment & Plan  Continue coumadin, monitor INR       VTE prophylaxis: coumadin

## 2019-08-09 NOTE — CARE PLAN
Problem: Fluid Volume:  Goal: Will maintain balanced intake and output  Outcome: PROGRESSING AS EXPECTED  Note:   Spironolactone ordered. Will monitor I&O. Will assess for s/sx of fluid imbalance.       Problem: Pain Management  Goal: Pain level will decrease to patient's comfort goal  Outcome: PROGRESSING AS EXPECTED  Note:   Pt denies pain or discomfort at this time. Pain managed with oxy on previous shift.

## 2019-08-09 NOTE — HEART FAILURE PROGRAM
Cardiovascular Nurse Navigator () Advanced Heart Failure Program Inpatient Consult Note:     Patient admitted with severe SOB after second treatment with Suttent. Echocardiogram showed EF of 15% this is down from prior of 50%.      Felt to a result of cardiotoxic effects of Suttent.     · HFrEF (15%)  · NYHA: IV  · Etiology: felt to be toxic related to chemotherapeutic treatment  · Consider for CardioMEMS commercial or GUIDE HF? No, new diagnosis    Demographics:    · Insurance: Medicare and [place]  · Residence: [facility] Secondary Prevention interventions:    · Pneumococcal vaccine: PNA vaccine assessment has been removed from the nursing flowsheets. Please ask MD to assess this patient for the PNA vaccine and order if appropriate. HF is a high risk condition regardless of age.  · Influenza vaccine: unavailable from     HFrEF GDMT:    · XAVIER - I: lisinopril  · Evidence Based BB (bisoprolol, carvedilol, or Toprol XL): carvedilol  · Aldosterone receptor antagonist: spironolactone   · AC for AF: n/a      Device Therapy Screening Tool     Not appropriate in de mainor diagnosis      Daily Weights: ordered    Please teach patient to weigh daily and write on symptom tracker.    Please assess patient's understanding of what to do if weight is out of range.    If pt does not own a working scale and cannot afford to purchase one, please provide one. Scales can be found in the Care Coordination Rooms on T[address].    I's and O's: ordered    If we are not tracking intake and output, we don't know if diuretics are working.    This also increases the risk that the patient will be sent home without enough fluid off.    [facility] Plan Notes: CTTA yesterday, no red flags    Therapy Notes: none    Advanced Care Planning:  No AD on file. Full code status at the time of this note's filing.    The ACC recommends engaging palliative care as part of optimization of HFrEF treatment to solicit goals of care and focus on quality  "of life throughout the clinical course of HF.    Follow up appointment:   If discharged from acute care to home (exception hospice discharge), pt must have an appointment scheduled within 7 days of discharge (Cardiology, PCP, or DC Clinic).    If discharged to Transitional Care Facility (LTAC, SNF, IRH), appointment should be made about a month out for after TCF.     Bedside Nursing Education:  Please provide HF booklet and repeated, ongoing education while administering medications, weighing patient, discussing management of symptoms, diet and need to follow up and act on changes.    Bedside Nursing Discharge:  When completing the after visit summary (discharge instructions) please select \"Cardiac Diagnosis, and Heart Failure\" in the special instructions section to populate the heart failure specific discharge instructions.     Referrals/Orders Placed:    Hospital Schedulers for HF f/u?  yes  Social Work   no  Registered Dietician  yes  [facility] CP Program for patients with Medicaid, [facility], or [facility] coverage?  no  Outpatient Care Coordination for patients with [facility] coverage and with 3 or more admissions within a year?  no    Yessica GARDUNO RN, CHFN ext. [phone] M-F        "

## 2019-08-09 NOTE — PROGRESS NOTES
Received beside report from day shift RN. Assumed car of pt at 1900. Pt A&Ox4. Up with 1 assist. Pt is fatigued, generalized weakness. Denies any pain or discomfort at this time. POC discussed. L PIV patent, blood return noted. Infusing TKO. VS Q4hr due to recent low BP, BP medications held. Monitoring for symptoms. Fall precautions in place. Bed alarm on. Bed locked in lowest position. Call light and belongings in reach. Pt educated to call for assistance, calls appropriately for needs.  No further needs at this time. Q2hr rounding in place.

## 2019-08-09 NOTE — PROGRESS NOTES
"Assumed care of pt @ 0700. Pt appears distracted. When asked states \" I'm fine, I just am worried about my wife at home she has terminal cancer.\" Emotional support provided. Pt denies pain, N/V, numbness or tingling. Pt IV flushed with no s/s of infiltration or infection. POC discussed with pt. Education provided on HF & reasons for monitoring strict I&O, and daily weights. Pt agreed to drink as much Boost as possible for the extra calories. SCD's applied. All needs met. Bed alarm. Bed locked & in lowest position. Call light & belongings in reach. Hourly rounding.   "

## 2019-08-09 NOTE — CONSULTS
Cardiology Initial Consultation    Date of Service  8/9/2019    Referring Physician  Lexy Owen M.D.    Reason for Consultation  Low EF    History of Presenting Illness  Roque Nguyen is a 64 y.o. male who presented 8/7/2019 with CHF. Has gastrointestinal stromal tumor followed by Dr. Dupont, failed Gleevec due to diarrheal symptoms, started on 2 doses of Sutent 15mg.  He was admitted with CHF symtpoms of severe SOB almost immediately after taking his second dose of Suttent with elevated BNP 2400, responded to IV lasix.  His echo had decreased in EF from 50% to 15%.  He was also being treated for pneumonia since wbc and procalcitonin elevated. Dr. Gao consulted for increase in size of mesenteric tumor on CT abdomen.  He states that Suttent does have cardiotoxicity and that all chemo will be stopped until cardiac function improves.         Cardiology consulted for recurrent low EF.  Prior low EF in the setting of severe MR, s/p repair with Dr. Sim 7-2015, complicated by CVA from antiphospholipid antibody syndrome.  On chronic anticoagulation.  Has sz DO.  Follows long term with Dr. Drew Warner.  Home meds include coreg and lisinopril     Hgb 9.2, plt 110  Cr 0.95  Abnl LFTs  Trop 17  Pro BNP 2420  BP soft at times     Wife with terminal ovarian cancer.  History is found that she has 2 weeks to live.  They have limited social support, no family but one family friend coming from [place] to stay with his wife.  He is devastated from this news.  He had difficulty giving me some of his history.    He is currently lying in bed and looks mildly frail and ill.  I am told by Dr. Owen that he was much better yesterday.  He tells me he has had worsening shortness of breath, abdominal and lower extremity swelling.  He is also had fatigue.  He does not recall many details about his heart history.    Review of Systems  Review of Systems   Genitourinary: Genital sores:      All other systems reviewed and are  negative.      Past Medical History   has a past medical history of Antiphospholipid syndrome ([facility]) (3/15/2010), ASTHMA, Cancer ([facility]) (2014), Chronic anticoagulation, Congestive heart failure ([facility]), CVA (cerebral vascular accident) ([facility]) (7/28/2015), Dental disorder, Epilepsy ([facility]) (5/14/2018), Hemorrhagic disorder ([facility]), Hypertension, LBBB (left bundle branch block), LV dysfunction (April 2015), Mitral regurgitation (April 2015), S/P mitral valve repair, Seizure disorder ([facility]) (2007), Snoring, TIA (transient ischemic attack) (2007/2008), and Tremors of nervous system.    Surgical History   has a past surgical history that includes inguinal hernia repair (4/20/2010); cholecystectomy; recovery (5/15/2015); and mitral valve repair (7/20/2015).    Family History  family history includes Cancer in his paternal uncle; Diabetes in his father; Heart Disease in his father and mother; Hypertension in his mother, paternal aunt, and sister; Lung Disease in his mother and paternal aunt; Psychiatric Illness in his maternal grandfather and sister.    Social History   reports that he quit smoking about 7 years ago. His smoking use included cigarettes. He started smoking about 34 years ago. He has a 37.00 pack-year smoking history. He has never used smokeless tobacco. He reports that he drinks alcohol. He reports that he does not use drugs.    Medications  Prior to Admission Medications   Prescriptions Last Dose Informant Patient Reported? Taking?   SUTENT 50 MG capsule 2 DAYS at D/C Patient Yes No   Sig: Take 1 Tab by mouth every day.   calcium citrate (CALCITRATE) 950 MG Tab 8/6/2019 at AM Patient Yes No   Sig: Take 950 mg by mouth every day.   carvedilol (COREG) 12.5 MG Tab 8/6/2019 at PM Patient No No   Sig: Take 1 Tab by mouth 2 times a day, with meals.   lacosamide (VIMPAT) 200 MG Tab tablet 8/6/2019 at PM Patient No No   Sig: Take 200 mg by mouth 2 Times a Day for 181 days.   levetiracetam (KEPPRA) 1000 MG tablet 8/6/2019 at  PM Patient No No   Sig: Take 2 Tabs by mouth 2 Times a Day.   lisinopril (PRINIVIL) 10 MG Tab 2019 at AM Patient No No   Sig: TAKE ONE TABLET BY MOUTH EVERY DAY   multivitamin (THERAGRAN) Tab 2019 at AM Patient Yes No   Sig: Take 1 Tab by mouth every day.   warfarin (COUMADIN) 1 MG Tab 2019 at 2000 Patient Yes Yes   Sig: Take 1 mg by mouth every day. INR goal:   2.0-3.0  TTR:   73.8 % (4 y)  INR used for dosin.10 (2019)  Warfarin maintenance plan:   1 mg (1 mg x 1) every day  Weekly warfarin total:   7 mg      Facility-Administered Medications: None       Allergies  No Known Allergies    Vital signs in last 24 hours  Temp:  [36.1 °C (97 °F)-37.2 °C (98.9 °F)] 36.2 °C (97.2 °F)  Pulse:  [100-116] 116  Resp:  [16-18] 16  BP: ()/(48-74) 93/65  SpO2:  [90 %-97 %] 92 %    Physical Exam  Physical Exam   General: No acute distress.  Mildly ill-appearing  HEENT: EOM grossly intact, no scleral icterus, no pharyngeal erythema.   Neck: JVP difficult to assess, patient supine, no bruits, trachea midline  CVS: RRR. Normal S1, S2.  2/6 systolic murmur at the apex no LE edema.  2+ radial pulses, 2+ PT pulses  Resp: Coarse breath sounds throughout the bases normal respiratory effort.  Abdomen: Soft, NT, no joey hepatomegaly.  MSK/Ext: No clubbing or cyanosis.  Skin: Warm and dry, no rashes.  Neurological: CN III-XII grossly intact. No focal deficits.  Generally weak  Psych: A&O x 3, mildly flat affect, adequate judgement, mildly aloof      Lab Review  Lab Results   Component Value Date/Time    WBC 10.0 2019 12:14 AM    RBC 3.02 (L) 2019 12:14 AM    HEMOGLOBIN 8.5 (L) 2019 12:14 AM    HEMATOCRIT 26.0 (L) 2019 12:14 AM    MCV 86.1 2019 12:14 AM    MCH 28.1 2019 12:14 AM    MCHC 32.7 (L) 2019 12:14 AM    MPV 11.1 2019 12:14 AM      Lab Results   Component Value Date/Time    SODIUM 131 (L) 2019 12:14 AM    POTASSIUM 4.3 2019 12:14 AM    CHLORIDE  100 08/09/2019 12:14 AM    CO2 21 08/09/2019 12:14 AM    GLUCOSE 114 (H) 08/09/2019 12:14 AM    BUN 25 (H) 08/09/2019 12:14 AM    CREATININE 1.06 08/09/2019 12:14 AM    CREATININE 1.0 08/28/2008 07:00 AM      Lab Results   Component Value Date/Time    ASTSGOT 27 08/07/2019 03:36 AM    ALTSGPT 15 08/07/2019 03:36 AM     Lab Results   Component Value Date/Time    CHOLSTRLTOT 155 05/15/2018 07:18 AM    LDL 88 05/15/2018 07:18 AM    HDL 50 05/15/2018 07:18 AM    TRIGLYCERIDE 83 05/15/2018 07:18 AM    TROPONINT 17 08/07/2019 03:36 AM       Recent Labs     08/07/19  0336 08/09/19  0014   NTPROBNP 2420* [id number]*       Cardiac Imaging and Procedures Review  EKG:  My personal interpretation of the EKG dated 8-7-19 is sinus, 88, first degree AV delay, atypical LBBB     Echocardiogram:  8-8-19  Severely reduced left ventricular systolic function.  Left ventricular ejection fraction is visually estimated to be 15%.  There is severe global hypokinesis with dyskinesis of the apical septal wall.  There is relative preservation of wall motion of the basal   inferolateral and anterolateral walls.  Mild septal left ventricular hypertrophy.  Mildly dilated right ventricle.  Mild to moderately reduced right ventricular systolic function.  Biatrial enlargement  Known mitral valve repair, mean transvalvular gradient is 16  mmHg at a   heart rate of 115 BPM.  Moderate MV repair stenosis.  Moderate eccentric anteriorly directed mitral regurgitation.  Aortic sclerosis without stenosis.  Moderate tricuspid regurgitation.  Right atrial pressure is estimated to be 8 mmHg.  Estimated right ventricular systolic pressure is 63 mmHg.  Pulmonic artery diastolic pressure is 20 mmHg.     Echo 5-23-19  Previous exam 04/2015, MV repair present with moderate mitral stenosis,   mild to moderate eccentric MR, EF improved, asc aorta not seen on   prior.  Left ventricular ejection fraction is visually estimated to be 50%, low   normal.  Right ventricle not  well visualized.  Mildly dilated left atrium.  Prior MV repair with 35 mm Vance flexible band, 2015.  Moderate mitral stenosis: Mean gradient 7-8 mmHg at 66 bpm.  Moderate, highly eccentric mitral regurgitation.  Mild tricuspid regurgitation.  RVSP estimated to be 32 mmHg above RAP.  Ascending aorta mildly dilated at 4 cm.     Echo 10-24-15  Moderately reduced left ventricular systolic function.  Left ventricular ejection fraction is visually estimated to be 40%.  Normal right ventricular size and systolic function.  Known mitral valve repair in July 2015 with 34 mm band.  Moderate mitral stenosis with a mean gradient of 8 mmHg at a heart rate   of 70 BPM.  Moderate mitral regurgitation.    Compared to the images of the TTE dated 4/19/2015, there has been   interval mitral valve repair for severe mitral regurgitation., Left   ventricular function is similar, estimated right ventricular systolic   pressure is improved from 50 mmHg.     MICHAEL 5-15-15  Severe central mitral regurgiation due to malcoaptation mainly at the   posterior medial commissure (A2-P2) with posteriorly directed eccentric   jet.  Left ventricle is moderately dilated.  Severely reduced left ventricular systolic function.  By 3D volumetric analysis the EF is 28% with an increased end diastolic   volume of 178 mL.     Compared to the images of the TTE dated 4/20/2015, the measured EF is   slightly reduced from 30%.  The nature of severe mitral regurgiation is   better described.  The prior study was technically limited by   tachycardia.     Cardiac Catheterization:  4-20-15  IMPRESSION:  1.  Non-angiographically significant stenosis in the proximal right coronary   artery.  2.  Global hypokinesis of the left ventricle, estimated ejection fraction of   35-40%.  3.  Elevated left ventricular end diastolic pressure of 23 mmHg.     Open heart surgery: 7-20-15  MVRepair (triang. Resection, leaflet debridement, chord reimplantation, 34mm Vance flexible  band)     CT PE chest 8-7-19  1.  No evidence of pulmonary embolism.  2.  Cardiomegaly.  3.  Interlobular septal thickening most likely related to pulmonary edema. Patchy bilateral groundglass opacities also likely related to pulmonary edema. Superimposed infection is not excluded and clinical correlation is recommended. Small right and   trace left pleural effusions.  4.  Nonspecific mild mediastinal and hilar adenopathy which is mildly increased from prior.     Carotid US 12-  IMPRESSION:  1. No significant atherosclerotic disease involving the bilateral      carotid arteries.  No evidence of a hemodynamically significant      stenosis bilaterally.  2. The bilateral vertebral and subclavian arteries are normal.        Imaging  Chest X-Ray:  8-7-19  Borderline cardiac silhouette enlargement. Bilateral surgical and perihilar opacities which most likely represent edema, with small right pleural effusion. Infection should be excluded clinically.         Assessment/Plan  -Dilated CMO, most likely due to Sutent, had a predisposition given prior cardiomyopathy in the setting of valve disease  - Gastrointestinal stromal tumor, on Sutent  -LBBB  -Prior MV repair, now with some stenosis and moderate MR, 7-2015  -Prior CVA  -Antiphospholipid antibody syndrome.  -On chronic anticoagulation.  -Severe sec pulm HTN  -Dilated asc aorta 4 cm  -HTN  -Sz DO  -GI stromal tumor- Dr. Dupont  -Hannah therpay  -PNA  -Nonobstructive CAD by cath 2015  -Anemia  -Thrombocytopenia  -Abnormal LFTs     PLAN:  -Sutent is associated with CHF/CMO, MI, HTN, prolonged QTc and Torsade, agree it needed to be stopped.  -CHF meds including coreg, lisinopril and aldactone, may be limited by low BP may be the limiting step.  Lisinopril is not dose dependent so 2.5 mg should suffice.  Carvedilol can be adjusted down as needed to make room for diuretics.  -Repeat echo in 3 months, consider ICD if EF still under 35%.  -Limited options for valve  therapy and now is not the appropriate time to discuss this with the patient.  -Cont warfarin     DW Dr. Owen-I have asked her to call cardiology with questions.  We will see him in the office and reevaluate his EF in 3 months to see if he needs an ICD.  Apparently his cancer will be considered slow-growing and he would have beyond 1 year life expectancy.  I will make Dr. Warner aware of the hospitalization.    Thank you for allowing me to participate in the care of this patient.    Cardiology will sign off on this patient    Please contact me with any questions.    Poly Alarcon M.D.   Cardiologist, [facility]  [phone]

## 2019-08-09 NOTE — PROGRESS NOTES
Pt BP 81/54 reported by CNA. RN retook BP reading was 84/53. Pt is asymptomatic and reports no dizziness. MD notified. Orders received. Hold BP medications. Due to EF, give 250 mL bolus of NS if SBP < 80 or pt is symptomatic.

## 2019-08-09 NOTE — PROGRESS NOTES
Inpatient Anticoagulation Service Note    Date: 8/9/2019    Reason for Anticoagulation: Mitral Valve Repair, Stroke, Other (Comments)(antiphospholipid syndrome)   Target INR: 2.0 to 3.0         Hemoglobin Value: (!) 8.5  Hematocrit Value: (!) 26    INR from last 7 days     Date/Time INR Value    08/09/19 0014  (!) 2.05    08/08/19 0002  (!) 1.93    08/07/19 0336  (!) 1.47        Dose from last 7 days     Date/Time Dose (mg)    08/09/19 0014  1    08/08/19 0841  1    08/07/19 1440  1        Average Dose (mg): 1  Significant Interactions: Antibiotics  Bridge Therapy: No     Reversal Agent Administered: Not Applicable  Comments: Patient admitted new onset CHF after 2 days of new start Sutent for recent diagnosis of GI stromal tumor.  Patient is on warfarin for h/o CVA, mitral valve repair, and antiphospholipid syndrome and is followed by the [facility]. H/H/Plts continues to trend down since admission (which may be due Sutent as well). There are no overt s/sx of bleeding noted per chart review. I did discuss decrease with MD.  No new warfarin-drug interactions noted since last assessment. INR is therapeutic today.  Will continue home dose of warfarin 1 mg po daily and will continue to trend INR for another day or 2 and then change INR draws to Mon/Wed/Fri.      Plan:  Continue home dose of warfarin 1 mg po daily and will continue to trend INR for another day or 2 and then change INR draws to Mon/Wed/Fri.    Education Material Provided?: No  Pharmacist suggested discharge dosing: Consider warfarin 1mg po qday with follow up to [facility] within 72 hours of discharge.      Carie Tyson, PharmD, BCOP

## 2019-08-09 NOTE — CARE PLAN
Problem: Fluid Volume:  Goal: Will maintain balanced intake and output  Outcome: PROGRESSING AS EXPECTED  Intervention: Monitor, educate, and encourage compliance with therapeutic intake of liquids  Note:   Educated provided on the need for strict I&O, Sodium restricted diet, and daily weights. Pt compliant and alerting staff of how much liquids consumed. Urinating in urinal for accurate recording.      Problem: Bowel/Gastric:  Goal: Normal bowel function is maintained or improved  Outcome: PROGRESSING AS EXPECTED  Intervention: Educate patient and significant other/support system about diet, fluid intake, medications and activity to promote bowel function  Note:   Pt educated on possible reasons for constipation. Provided prune juice with butter & pt had a small Bm.      Problem: Discharge Barriers/Planning  Goal: Patient's continuum of care needs will be met  Outcome: PROGRESSING AS EXPECTED  Intervention: Assess potential discharge barriers on admission and throughout hospital stay  Note:   Opened up about potential complications at home. Wife has terminal cancer and is worried about her. Plan is to do a home O2 assessment as he does the grocery shopping & lives on the second story of his apartment building

## 2019-08-10 VITALS
BODY MASS INDEX: 28.66 KG/M2 | HEIGHT: 66 IN | OXYGEN SATURATION: 92 % | DIASTOLIC BLOOD PRESSURE: 57 MMHG | SYSTOLIC BLOOD PRESSURE: 98 MMHG | HEART RATE: 122 BPM | TEMPERATURE: 97.2 F | RESPIRATION RATE: 17 BRPM | WEIGHT: 178.35 LBS

## 2019-08-10 PROBLEM — I05.0 MITRAL STENOSIS: Status: ACTIVE | Noted: 2019-08-10

## 2019-08-10 PROBLEM — J96.01 ACUTE RESPIRATORY FAILURE WITH HYPOXIA (HCC): Status: RESOLVED | Noted: 2019-08-07 | Resolved: 2019-08-10

## 2019-08-10 PROBLEM — Z51.81 ENCOUNTER FOR MONITORING CARDIOTOXIC DRUG THERAPY: Status: ACTIVE | Noted: 2019-08-10

## 2019-08-10 PROBLEM — A41.9 SEPSIS (HCC): Status: RESOLVED | Noted: 2019-08-07 | Resolved: 2019-08-10

## 2019-08-10 PROBLEM — Z79.899 ENCOUNTER FOR MONITORING CARDIOTOXIC DRUG THERAPY: Status: ACTIVE | Noted: 2019-08-10

## 2019-08-10 LAB
ANION GAP SERPL CALC-SCNC: 8 MMOL/L (ref 0–11.9)
BASOPHILS # BLD AUTO: 0.2 % (ref 0–1.8)
BASOPHILS # BLD: 0.02 K/UL (ref 0–0.12)
BUN SERPL-MCNC: 21 MG/DL (ref 8–22)
CALCIUM SERPL-MCNC: 8.2 MG/DL (ref 8.5–10.5)
CHLORIDE SERPL-SCNC: 102 MMOL/L (ref 96–112)
CO2 SERPL-SCNC: 22 MMOL/L (ref 20–33)
CREAT SERPL-MCNC: 0.94 MG/DL (ref 0.5–1.4)
EOSINOPHIL # BLD AUTO: 0.03 K/UL (ref 0–0.51)
EOSINOPHIL NFR BLD: 0.4 % (ref 0–6.9)
ERYTHROCYTE [DISTWIDTH] IN BLOOD BY AUTOMATED COUNT: 44.4 FL (ref 35.9–50)
GLUCOSE SERPL-MCNC: 98 MG/DL (ref 65–99)
HCT VFR BLD AUTO: 27.6 % (ref 42–52)
HGB BLD-MCNC: 8.7 G/DL (ref 14–18)
IMM GRANULOCYTES # BLD AUTO: 0.02 K/UL (ref 0–0.11)
IMM GRANULOCYTES NFR BLD AUTO: 0.2 % (ref 0–0.9)
INR PPP: 1.92 (ref 0.87–1.13)
LYMPHOCYTES # BLD AUTO: 1.25 K/UL (ref 1–4.8)
LYMPHOCYTES NFR BLD: 15.4 % (ref 22–41)
MCH RBC QN AUTO: 28.4 PG (ref 27–33)
MCHC RBC AUTO-ENTMCNC: 31.5 G/DL (ref 33.7–35.3)
MCV RBC AUTO: 90.2 FL (ref 81.4–97.8)
MONOCYTES # BLD AUTO: 0.63 K/UL (ref 0–0.85)
MONOCYTES NFR BLD AUTO: 7.8 % (ref 0–13.4)
NEUTROPHILS # BLD AUTO: 6.15 K/UL (ref 1.82–7.42)
NEUTROPHILS NFR BLD: 76 % (ref 44–72)
NRBC # BLD AUTO: 0 K/UL
NRBC BLD-RTO: 0 /100 WBC
PLATELET # BLD AUTO: 162 K/UL (ref 164–446)
PMV BLD AUTO: 10.6 FL (ref 9–12.9)
POTASSIUM SERPL-SCNC: 4.2 MMOL/L (ref 3.6–5.5)
PROTHROMBIN TIME: 22.6 SEC (ref 12–14.6)
RBC # BLD AUTO: 3.06 M/UL (ref 4.7–6.1)
SODIUM SERPL-SCNC: 132 MMOL/L (ref 135–145)
WBC # BLD AUTO: 8.1 K/UL (ref 4.8–10.8)

## 2019-08-10 PROCEDURE — 99239 HOSP IP/OBS DSCHRG MGMT >30: CPT | Performed by: HOSPITALIST

## 2019-08-10 PROCEDURE — 700102 HCHG RX REV CODE 250 W/ 637 OVERRIDE(OP): Performed by: HOSPITALIST

## 2019-08-10 PROCEDURE — 80048 BASIC METABOLIC PNL TOTAL CA: CPT

## 2019-08-10 PROCEDURE — A9270 NON-COVERED ITEM OR SERVICE: HCPCS | Performed by: INTERNAL MEDICINE

## 2019-08-10 PROCEDURE — 85025 COMPLETE CBC W/AUTO DIFF WBC: CPT

## 2019-08-10 PROCEDURE — 700102 HCHG RX REV CODE 250 W/ 637 OVERRIDE(OP): Performed by: INTERNAL MEDICINE

## 2019-08-10 PROCEDURE — 85610 PROTHROMBIN TIME: CPT

## 2019-08-10 PROCEDURE — 36415 COLL VENOUS BLD VENIPUNCTURE: CPT

## 2019-08-10 PROCEDURE — A9270 NON-COVERED ITEM OR SERVICE: HCPCS | Performed by: HOSPITALIST

## 2019-08-10 RX ORDER — OXYCODONE HYDROCHLORIDE 5 MG/1
5 TABLET ORAL EVERY 6 HOURS PRN
Qty: 28 TAB | Refills: 0 | Status: SHIPPED | OUTPATIENT
Start: 2019-08-10 | End: 2019-08-17

## 2019-08-10 RX ORDER — LISINOPRIL 2.5 MG/1
2.5 TABLET ORAL DAILY
Qty: 30 TAB | Refills: 3 | Status: SHIPPED | OUTPATIENT
Start: 2019-08-10

## 2019-08-10 RX ORDER — SPIRONOLACTONE 25 MG/1
12.5 TABLET ORAL DAILY
Qty: 30 TAB | Refills: 3 | Status: SHIPPED | OUTPATIENT
Start: 2019-08-11

## 2019-08-10 RX ORDER — WARFARIN SODIUM 1 MG/1
1 TABLET ORAL
Status: DISCONTINUED | OUTPATIENT
Start: 2019-08-11 | End: 2019-08-10 | Stop reason: HOSPADM

## 2019-08-10 RX ORDER — AMOXICILLIN AND CLAVULANATE POTASSIUM 875; 125 MG/1; MG/1
1 TABLET, FILM COATED ORAL EVERY 12 HOURS
Qty: 4 TAB | Refills: 0 | Status: SHIPPED | OUTPATIENT
Start: 2019-08-10 | End: 2019-08-12

## 2019-08-10 RX ORDER — WARFARIN SODIUM 3 MG/1
1.5 TABLET ORAL
Status: DISCONTINUED | OUTPATIENT
Start: 2019-08-10 | End: 2019-08-10 | Stop reason: HOSPADM

## 2019-08-10 RX ORDER — AMOXICILLIN 250 MG
2 CAPSULE ORAL DAILY
Qty: 60 TAB | Refills: 0 | Status: SHIPPED | OUTPATIENT
Start: 2019-08-10 | End: 2019-09-05

## 2019-08-10 RX ADMIN — LISINOPRIL 2.5 MG: 2.5 TABLET ORAL at 09:00

## 2019-08-10 RX ADMIN — LACOSAMIDE 200 MG: 100 TABLET, FILM COATED ORAL at 06:09

## 2019-08-10 RX ADMIN — CARVEDILOL 12.5 MG: 12.5 TABLET, FILM COATED ORAL at 07:49

## 2019-08-10 RX ADMIN — AMOXICILLIN AND CLAVULANATE POTASSIUM 1 TABLET: 875; 125 TABLET, FILM COATED ORAL at 06:10

## 2019-08-10 RX ADMIN — LEVETIRACETAM 2000 MG: 500 TABLET, FILM COATED ORAL at 06:10

## 2019-08-10 RX ADMIN — OXYCODONE HYDROCHLORIDE 5 MG: 5 TABLET ORAL at 07:44

## 2019-08-10 RX ADMIN — SENNOSIDES, DOCUSATE SODIUM 2 TABLET: 50; 8.6 TABLET, FILM COATED ORAL at 06:10

## 2019-08-10 RX ADMIN — SPIRONOLACTONE 12.5 MG: 25 TABLET ORAL at 06:09

## 2019-08-10 NOTE — PROGRESS NOTES
Received bedside report from day shift RN. Assumed care of pt at 1900. Pt A&Ox4. Up with 1 assist. Pt seems down and anxious to go home. Reports being worried about his wife and home situation, and eager to D/C. POC discussed. Pt reports pain 5/10, pain meds were just given 30 mins prior. Will reassess. Pt L PIV changed to be saline locked since abx changed to PO. PIV flushes, no blood return noted. Education provided on strict I&Os, daily weights. Fall precautions in place. Bed alarm on. Bed locked in lowest position. Call light and belongings in reach. All needs met at this time. Q2hr rounding in place. Pt calls appropriately for needs and assistance.

## 2019-08-10 NOTE — DISCHARGE SUMMARY
"Discharge Summary    CHIEF COMPLAINT ON ADMISSION  Chief Complaint   Patient presents with   • Shortness of Breath     pt presents c/o SOB after taking new oral chemo therapy med \"sultent\" started taking it yesterday. hx of rxn to past oral chemo meds. 92% RA. pt brought back directly from lobby to room. ERP at bedside. hx of stomach tumor       Reason for Admission  SOB     Admission Date  8/7/2019    CODE STATUS  Full Code    HPI & HOSPITAL COURSE  This is a 64 y.o. male here with severe SOB sudden onset after second dose of Sutent.   8/8:   This 64 y.o. 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 Dupont, failed Gleevec due to diarrheal symptoms, started on 2 doses of Suttent 15mg.  He was admitted with CHF symtpoms of severe SOB almost immediately after taking his second dose of Sutent with elevated BNP 2400, responded to IV lasix.  His echo had decreased in EF from 50% to 15%.  He was also being treated for pneumonia since wbc and procalcitonin elevated.  He was downgraded from vanco, cefepime to Rocephin IV. Cardiology was consulted for new onset CHF.  Dr. Gao consulted for increase in size of mesenteric tumor on CT abdomen.  He states that Suttent does have cardiotoxicity and that all chemo will be stopped until cardiac function improves.   8/9: Patient seen.  He appeared somewhat depressed lying in bed today which is completely different from yesterday.  He is quite depressed about his decreased heart function from 50% to 15%.  He did meet with cardiology Dr. Alarcon today who plans to repeat the echocardiogram in 3 months time.  The patient states he is getting some help from friends to help with his wife who is actively dying from uterine cancer.  Unfortunately patient's blood pressure 90/60 and therefore his Coreg was held as well as his lisinopril.  I have decreased Pranactin to 12.5 mill " grams daily decreased his lisinopril to 2.5 mg daily continue Coreg 12.5 twice daily as tolerated with holding parameters hold for systolic blood pressure less than 90.  This was all discussed with Dr. Alarcon by phone.  INR is therapeutic.  Per Dr. Alarcon mitral valve is showing signs of stenosis.   *       The patient was devastated by the news of his new onset heart failure given that his wife is actively dying of uterine cancer.  He is getting a family friend from [place] to help him with his wife.  He is anxious to go home to her.  His blood pressure improved to 103/68 and he was able to take his coreg this a.m.  Unfortunately, due to low BPs 90/60s his coreg had been held on 8/9/19.  Therefore, his lisinopril was decreased from 10 to 2.5mg daily to make room for diuretics spironolactone 12.5mg daily.  He is unable at this time to tolerate additional lasix.  He will finish his last day of augmentin to complete 5 day treatment for CAP after vanco/cefepime/rocephin x 3 days.  His wbc and procalcitonin normalized.  He was on RA, ambulating and tolerating a diet, up self.    Therefore, he is discharged in good and stable condition to home with close outpatient follow-up.    The patient met 2-midnight criteria for an inpatient stay at the time of discharge.    Discharge Date  8/10/2019    FOLLOW UP ITEMS POST DISCHARGE  Follow up with Dr. Warner CHF in 7 days.  Follow up with Dr. Dupont in 3 months after echocardiogram to evaluate restart of chemotherapy.    DISCHARGE DIAGNOSES  Active Problems:    Seizure (HCC) POA: Yes    Antiphospholipid syndrome (HCC) POA: Yes    S/P mitral valve repair (Chronic) POA: Yes    Gastrointestinal stromal tumor (GIST) (HCC) POA: Yes      Overview: Dr. Dupont, biopsy proven 4/29/2019    CAP (community acquired pneumonia) POA: Yes    Acute on chronic congestive heart failure (HCC) POA: Yes    Mitral stenosis POA: Yes    Encounter for monitoring cardiotoxic drug therapy POA:  Yes  Resolved Problems:    Acute respiratory failure with hypoxia (HCC) POA: Yes    Sepsis (HCC) POA: Yes      FOLLOW UP  Future Appointments   Date Time Provider Department Center   8/12/2019 11:00 AM [place] PHARMACIST [facility]   8/13/2019 10:35 AM [facility] [place]   8/15/2019  3:00 PM Zak Jaeger M.D. [facility] None     Callie Dupont M.D.  [address] [place] [id number]  [phone]    Schedule an appointment as soon as possible for a visit in 3 months  Follow up for restart of chemotherapy       MEDICATIONS ON DISCHARGE     Medication List      START taking these medications      Instructions   amoxicillin-clavulanate 875-125 MG Tabs  Commonly known as:  AUGMENTIN   Take 1 Tab by mouth every 12 hours for 2 days.  Dose:  1 Tab     oxyCODONE immediate-release 5 MG Tabs  Commonly known as:  ROXICODONE   Take 1 Tab by mouth every 6 hours as needed for Severe Pain for up to 7 days.  Dose:  5 mg     senna-docusate 8.6-50 MG Tabs  Commonly known as:  PERICOLACE or SENOKOT S   Take 2 Tabs by mouth every day.  Dose:  2 Tab     spironolactone 25 MG Tabs  Start taking on:  8/11/2019  Commonly known as:  ALDACTONE   Take 0.5 Tabs by mouth every day.  Dose:  12.5 mg        CHANGE how you take these medications      Instructions   lisinopril 2.5 MG Tabs  What changed:    · medication strength  · how much to take  · when to take this  Commonly known as:  PRINIVIL   Take 1 Tab by mouth every day.  Dose:  2.5 mg        CONTINUE taking these medications      Instructions   carvedilol 12.5 MG Tabs  Commonly known as:  COREG   Take 1 Tab by mouth 2 times a day, with meals.  Dose:  12.5 mg     lacosamide 200 MG Tabs tablet  Commonly known as:  VIMPAT   Take 200 mg by mouth 2 Times a Day for 181 days.  Dose:  200 mg     levetiracetam 1000 MG tablet  Commonly known as:  KEPPRA   Take 2 Tabs by mouth 2 Times a Day.  Dose:  2,000 mg     multivitamin Tabs   Take 1 Tab by mouth every day.  Dose:   1 Tab     warfarin 1 MG Tabs  Commonly known as:  COUMADIN   Take 1 mg by mouth every day. INR goal:   2.0-3.0  TTR:   73.8 % (4 y)  INR used for dosin.10 (2019)  Warfarin maintenance plan:   1 mg (1 mg x 1) every day  Weekly warfarin total:   7 mg  Dose:  1 mg        STOP taking these medications    calcium citrate 950 MG Tabs  Commonly known as:  CALCITRATE     SUTENT 50 MG capsule  Generic drug:  sunitinib            Allergies  No Known Allergies    DIET  Orders Placed This Encounter   Procedures   • Diet Order Cardiac, 2 Gram Sodium     Standing Status:   Standing     Number of Occurrences:   1     Order Specific Question:   Diet:     Answer:   Cardiac [6]     Order Specific Question:   Diet:     Answer:   2 Gram Sodium [7]   • Discontinue Diet Tray     Standing Status:   Standing     Number of Occurrences:   1   • Discontinue Diet Tray     Standing Status:   Standing     Number of Occurrences:   1       ACTIVITY  As tolerated.  Weight bearing as tolerated    CONSULTATIONS  Dr. Dorian Gao    PROCEDURES  Transthoracic  Echo Report      Echocardiography Laboratory    CONCLUSIONS  Severely reduced left ventricular systolic function.  Left ventricular ejection fraction is visually estimated to be 15%.  There is severe global hypokinesis with dyskinesis of the apical septal   wall.  There is relative preservation of wall motion of the basal   inferolateral and anterolateral walls.  Mild septal left ventricular hypertrophy.  Mildly dilated right ventricle.  Mild to moderately reduced right ventricular systolic function.  Biatrial enlargement  Known mitral valve repair, mean transvalvular gradient is 16  mmHg at a   heart rate of 115 BPM.  Moderate MV repair stenosis.  Moderate eccentric anteriorly directed mitral regurgitation.  Aortic sclerosis without stenosis.  Moderate tricuspid regurgitation.  Right atrial pressure is estimated to be 8 mmHg.  Estimated right ventricular systolic pressure is 63  mmHg.  Pulmonic artery diastolic pressure is 20 mmHg.    Compared to the images of the prior study done  on 05/23/19: based on   side by side comparison, the previous LVEF appeared to be 40% and has   decreased to 15%. The wall motion abnormalities are new. The RV now   appears to have mild to moderate dysfunction and is slightly better   visualized in the study today. The mitral valve repair now has a   gradient of 16mmHg from 8mmHg, likely due to tachycardia. The mitral   regurgitation is unchanged. The estimated PASP has increased from 32 +   CVP to 63mmHg. The aortic size has not changed.     hospitalist notifed of above study findings at time of signature.     EJ NIETO  Exam Date:         08/08/2019                      11:39    CTA chest:      1.  No evidence of pulmonary embolism.  2.  Cardiomegaly.  3.  Interlobular septal thickening most likely related to pulmonary edema. Patchy bilateral groundglass opacities also likely related to pulmonary edema. Superimposed infection is not excluded and clinical correlation is recommended. Small right and   trace left pleural effusions.  4.  Nonspecific mild mediastinal and hilar adenopathy which is mildly increased from prior.     IR Documentation Timeline (8/10/2019 08:32:19 to 8/10/2019 08:32:19)     Reading Provider Reading Date   Chacorta Bryant M.D. Aug 7, 2019     CT abdomen/pelvis with contrast:      Significant increase in size of malignant mesenteric soft tissue mass in comparison to prior studies. No new metastatic disease is seen.    Findings within the lung bases as detailed on separate chest CT report.       IR Documentation Timeline (8/10/2019 08:32:46 to 8/10/2019 08:32:46)     Reading Provider Reading Date   Chacorta Bryant M.D. Aug 7, 2019       LABORATORY  Lab Results   Component Value Date    SODIUM 132 (L) 08/10/2019    POTASSIUM 4.2 08/10/2019    CHLORIDE 102 08/10/2019    CO2 22 08/10/2019    GLUCOSE 98 08/10/2019    BUN 21 08/10/2019     CREATININE 0.94 08/10/2019    CREATININE 1.0 08/28/2008        Lab Results   Component Value Date    WBC 8.1 08/10/2019    HEMOGLOBIN 8.7 (L) 08/10/2019    HEMATOCRIT 27.6 (L) 08/10/2019    PLATELETCT 162 (L) 08/10/2019        Total time of the discharge process exceeds 48 minutes.

## 2019-08-10 NOTE — PROGRESS NOTES
Provided in depth Heart failure education. Went through HF booklet page by page with patient. Assessed learning with verbal read back. All questions & concerns addressed. Will provide more education to pt & family @ time of d/c later this afternoon.

## 2019-08-10 NOTE — CARE PLAN
Problem: Fluid Volume:  Goal: Will maintain balanced intake and output  Outcome: PROGRESSING AS EXPECTED  Intervention: Monitor, educate, and encourage compliance with therapeutic intake of liquids  Note:   Pt took initiative to read HF booklet again after we went through it together. Strict I&O in place.      Problem: Bowel/Gastric:  Goal: Normal bowel function is maintained or improved  Outcome: PROGRESSING AS EXPECTED  Intervention: Educate patient and significant other/support system about signs and symptoms of constipation and interventions to implement  Note:   Educated pt on reasons for constipation. Narcotics, lack of ambulation, not enough fluids. Provided pt prune juice w/ butter. Has had 3 BM's thus far.

## 2019-08-10 NOTE — DISCHARGE INSTRUCTIONS
Discharge Instructions    Discharged to home by car with relative. Discharged via wheelchair, hospital escort: Yes.  Special equipment needed: Not Applicable    Be sure to schedule a follow-up appointment with your primary care doctor or any specialists as instructed.     Discharge Plan:   Diet Plan: Discussed  Activity Level: Discussed  Confirmed Follow up Appointment: Appointment Scheduled  Confirmed Symptoms Management: Discussed  Medication Reconciliation Updated: Yes  Influenza Vaccine Indication: Indicated: Not available from distributor/    I understand that a diet low in cholesterol, fat, and sodium is recommended for good health. Unless I have been given specific instructions below for another diet, I accept this instruction as my diet prescription.   Other diet: Cardiac diet. 2g sodium limit     Special Instructions: None    · Is patient discharged on Warfarin / Coumadin?   Yes    You are receiving the drug warfarin. Please understand the importance of monitoring warfarin with scheduled PT/INR blood draws.  Follow[facility]up with the [facility] in one week for INR lab..    IMPORTANT: HOW TO USE THIS INFORMATION:  This is a summary and does NOT have all possible information about this product. This information does not assure that this product is safe, effective, or appropriate for you. This information is not individual medical advice and does not substitute for the advice of your health care professional. Always ask your health care professional for complete information about this product and your specific health needs.      WARFARIN - ORAL (WARF-uh-rin)      COMMON BRAND NAME(S): Coumadin      WARNING:  Warfarin can cause very serious (possibly fatal) bleeding. This is more likely to occur when you first start taking this medication or if you take too much warfarin. To decrease your risk for bleeding, your doctor or other health care provider will monitor you closely and check your lab results  "(INR test) to make sure you are not taking too much warfarin. Keep all medical and laboratory appointments. Tell your doctor right away if you notice any signs of serious bleeding. See also Side Effects section.      USES:  This medication is used to treat blood clots (such as in deep vein thrombosis-DVT or pulmonary embolus-PE) and/or to prevent new clots from forming in your body. Preventing harmful blood clots helps to reduce the risk of a stroke or heart attack. Conditions that increase your risk of developing blood clots include a certain type of irregular heart rhythm (atrial fibrillation), heart valve replacement, recent heart attack, and certain surgeries (such as hip/knee replacement). Warfarin is commonly called a \"blood thinner,\" but the more correct term is \"anticoagulant.\" It helps to keep blood flowing smoothly in your body by decreasing the amount of certain substances (clotting proteins) in your blood.      HOW TO USE:  Read the Medication Guide provided by your pharmacist before you start taking warfarin and each time you get a refill. If you have any questions, ask your doctor or pharmacist. Take this medication by mouth with or without food as directed by your doctor or other health care professional, usually once a day. It is very important to take it exactly as directed. Do not increase the dose, take it more frequently, or stop using it unless directed by your doctor. Dosage is based on your medical condition, laboratory tests (such as INR), and response to treatment. Your doctor or other health care provider will monitor you closely while you are taking this medication to determine the right dose for you. Use this medication regularly to get the most benefit from it. To help you remember, take it at the same time each day. It is important to eat a balanced, consistent diet while taking warfarin. Some foods can affect how warfarin works in your body and may affect your treatment and dose. Avoid " sudden large increases or decreases in your intake of foods high in vitamin K (such as broccoli, cauliflower, cabbage, brussels sprouts, kale, spinach, and other green leafy vegetables, liver, green tea, certain vitamin supplements). If you are trying to lose weight, check with your doctor before you try to go on a diet. Cranberry products may also affect how your warfarin works. Limit the amount of cranberry juice (16 ounces/480 milliliters a day) or other cranberry products you may drink or eat.      SIDE EFFECTS:  Nausea, loss of appetite, or stomach/abdominal pain may occur. If any of these effects persist or worsen, tell your doctor or pharmacist promptly. Remember that your doctor has prescribed this medication because he or she has judged that the benefit to you is greater than the risk of side effects. Many people using this medication do not have serious side effects. This medication can cause serious bleeding if it affects your blood clotting proteins too much (shown by unusually high INR lab results). Even if your doctor stops your medication, this risk of bleeding can continue for up to a week. Tell your doctor right away if you have any signs of serious bleeding, including: unusual pain/swelling/discomfort, unusual/easy bruising, prolonged bleeding from cuts or gums, persistent/frequent nosebleeds, unusually heavy/prolonged menstrual flow, pink/dark urine, coughing up blood, vomit that is bloody or looks like coffee grounds, severe headache, dizziness/fainting, unusual or persistent tiredness/weakness, bloody/black/tarry stools, chest pain, shortness of breath, difficulty swallowing. Tell your doctor right away if any of these unlikely but serious side effects occur: persistent nausea/vomiting, severe stomach/abdominal pain, yellowing eyes/skin. This drug rarely has caused very serious (possibly fatal) problems if its effects lead to small blood clots (usually at the beginning of treatment). This can  lead to severe skin/tissue damage that may require surgery or amputation if left untreated. Patients with certain blood conditions (protein C or S deficiency) may be at greater risk. Get medical help right away if any of these rare but serious side effects occur: painful/red/purplish patches on the skin (such as on the toe, breast, abdomen), change in the amount of urine, vision changes, confusion, slurred speech, weakness on one side of the body. A very serious allergic reaction to this drug is rare. However, get medical help right away if you notice any symptoms of a serious allergic reaction, including: rash, itching/swelling (especially of the face/tongue/throat), severe dizziness, trouble breathing. This is not a complete list of possible side effects. If you notice other effects not listed above, contact your doctor or pharmacist. In the [place] - Call your doctor for medical advice about side effects. You may report side effects to FDA at 7-014-ILT-7597. In [place] - Call your doctor for medical advice about side effects. You may report side effects to [facility] [place] at 1-433.670.7663.      PRECAUTIONS:  Before taking warfarin, tell your doctor or pharmacist if you are allergic to it; or if you have any other allergies. This product may contain inactive ingredients, which can cause allergic reactions or other problems. Talk to your pharmacist for more details. Before using this medication, tell your doctor or pharmacist your medical history, especially of: blood disorders (such as anemia, hemophilia), bleeding problems (such as bleeding of the stomach/intestines, bleeding in the brain), blood vessel disorders (such as aneurysms), recent major injury/surgery, liver disease, alcohol use, mental/mood disorders (including memory problems), frequent falls/injuries. It is important that all your doctors and dentists know that you take warfarin. Before having surgery or any medical/dental procedures, tell your doctor or  dentist that you are taking this medication and about all the products you use (including prescription drugs, nonprescription drugs, and herbal products). Avoid getting injections into the muscles. If you must have an injection into a muscle (for example, a flu shot), it should be given in the arm. This way, it will be easier to check for bleeding and/or apply pressure bandages. This medication may cause stomach bleeding. Daily use of alcohol while using this medicine will increase your risk for stomach bleeding and may also affect how this medication works. Limit or avoid alcoholic beverages. If you have not been eating well, if you have an illness or infection that causes fever, vomiting, or diarrhea for more than 2 days, or if you start using any antibiotic medications, contact your doctor or pharmacist immediately because these conditions can affect how warfarin works. This medication can cause heavy bleeding. To lower the chance of getting cut, bruised, or injured, use great caution with sharp objects like safety razors and nail cutters. Use an electric razor when shaving and a soft toothbrush when brushing your teeth. Avoid activities such as contact sports. If you fall or injure yourself, especially if you hit your head, call your doctor immediately. Your doctor may need to check you. The Food & Drug Administration has stated that generic warfarin products are interchangeable. However, consult your doctor or pharmacist before switching warfarin products. Be careful not to take more than one medication that contains warfarin unless specifically directed by the doctor or health care provider who is monitoring your warfarin treatment. Older adults may be at greater risk for bleeding while using this drug. This medication is not recommended for use during pregnancy because of serious (possibly fatal) harm to an unborn baby. Discuss the use of reliable forms of birth control with your doctor. If you become  "pregnant or think you may be pregnant, tell your doctor immediately. If you are planning pregnancy, discuss a plan for managing your condition with your doctor before you become pregnant. Your doctor may switch the type of medication you use during pregnancy. Very small amounts of this medication may pass into breast milk but is unlikely to harm a nursing infant. Consult your doctor before breast-feeding.      DRUG INTERACTIONS:  Drug interactions may change how your medications work or increase your risk for serious side effects. This document does not contain all possible drug interactions. Keep a list of all the products you use (including prescription/nonprescription drugs and herbal products) and share it with your doctor and pharmacist. Do not start, stop, or change the dosage of any medicines without your doctor's approval. Warfarin interacts with many prescription, nonprescription, vitamin, and herbal products. This includes medications that are applied to the skin or inside the vagina or rectum. The interactions with warfarin usually result in an increase or decrease in the \"blood-thinning\" (anticoagulant) effect. Your doctor or other health care professional should closely monitor you to prevent serious bleeding or clotting problems. While taking warfarin, it is very important to tell your doctor or pharmacist of any changes in medications, vitamins, or herbal products that you are taking. Some products that may interact with this drug include: capecitabine, imatinib, mifepristone. Aspirin, aspirin-like drugs (salicylates), and nonsteroidal anti-inflammatory drugs (NSAIDs such as ibuprofen, naproxen, celecoxib) may have effects similar to warfarin. These drugs may increase the risk of bleeding problems if taken during treatment with warfarin. Carefully check all prescription/nonprescription product labels (including drugs applied to the skin such as pain-relieving creams) since the products may contain " NSAIDs or salicylates. Talk to your doctor about using a different medication (such as acetaminophen) to treat pain/fever. Low-dose aspirin and related drugs (such as clopidogrel, ticlopidine) should be continued if prescribed by your doctor for specific medical reasons such as heart attack or stroke prevention. Consult your doctor or pharmacist for more details. Many herbal products interact with warfarin. Tell your doctor before taking any herbal products, especially bromelains, coenzyme Q10, cranberry, danshen, dong quai, fenugreek, garlic, ginkgo biloba, ginseng, and [place]'s wort, among others. This medication may interfere with a certain laboratory test to measure theophylline levels, possibly causing false test results. Make sure laboratory personnel and all your doctors know you use this drug.      OVERDOSE:  If overdose is suspected, contact a poison control center or emergency room immediately. [place] residents can call the [employer] Poison Hotline at 1-845.175.6095. [place] residents can call a provincial poison control center. Symptoms of overdose may include: bloody/black/tarry stools, pink/dark urine, unusual/prolonged bleeding.      NOTES:  Do not share this medication with others. Laboratory and/or medical tests (such as INR, complete blood count) must be performed periodically to monitor your progress or check for side effects. Consult your doctor for more details.      MISSED DOSE:  For the best possible benefit, do not miss any doses. If you do miss a dose and remember on the same day, take it as soon as you remember. If you remember on the next day, skip the missed dose and resume your usual dosing schedule. Do not double the dose to catch up because this could increase your risk for bleeding. Keep a record of missed doses to give to your doctor or pharmacist. Contact your doctor or pharmacist if you miss 2 or more doses in a row.      STORAGE:  Store at room temperature away from light and  moisture. Do not store in the bathroom. Keep all medications away from children and pets. Do not flush medications down the toilet or pour them into a drain unless instructed to do so. Properly discard this product when it is  or no longer needed. Consult your pharmacist or local waste disposal company for more details about how to safely discard your product.      MEDICAL ALERT:  Your condition and medication can cause complications in a medical emergency. For information about enrolling in MedicAlert, call 1-241.568.5352 ([place]) or 1-731.774.9137 ([place]).      Information last revised 2010 Copyright(c)  [employer]             Depression / Suicide Risk    As you are discharged from this [facility] facility, it is important to learn how to keep safe from harming yourself.    Recognize the warning signs:  · Abrupt changes in personality, positive or negative- including increase in energy   · Giving away possessions  · Change in eating patterns- significant weight changes-  positive or negative  · Change in sleeping patterns- unable to sleep or sleeping all the time   · Unwillingness or inability to communicate  · Depression  · Unusual sadness, discouragement and loneliness  · Talk of wanting to die  · Neglect of personal appearance   · Rebelliousness- reckless behavior  · Withdrawal from people/activities they love  · Confusion- inability to concentrate     If you or a loved one observes any of these behaviors or has concerns about self-harm, here's what you can do:  · Talk about it- your feelings and reasons for harming yourself  · Remove any means that you might use to hurt yourself (examples: pills, rope, extension cords, firearm)  · Get professional help from the community (Mental Health, Substance Abuse, psychological counseling)  · Do not be alone:Call your Safe Contact- someone whom you trust who will be there for you.  · Call your local CRISIS HOTLINE 395-0732 or  519.702.7361  · Call your local Children's Mobile Crisis Response Team [place] (174) 249-7213 or www.Familio  · Call the toll free National Suicide Prevention Hotlines   · National Suicide Prevention Lifeline 228-767-OJKP (1860)  · [employer] Line Network 800-SUICIDE (582-5574)        Heart Failure  Heart failure means your heart has trouble pumping blood. This makes it hard for your body to work well. Heart failure is usually a long-term (chronic) condition. You must take good care of yourself and follow your doctor's treatment plan.  HOME CARE  · Take your heart medicine as told by your doctor.  ¨ Do not stop taking medicine unless your doctor tells you to.  ¨ Do not skip any dose of medicine.  ¨ Refill your medicines before they run out.  ¨ Take other medicines only as told by your doctor or pharmacist.  · Stay active if told by your doctor. The elderly and people with severe heart failure should talk with a doctor about physical activity.  · Eat heart-healthy foods. Choose foods that are without trans fat and are low in saturated fat, cholesterol, and salt (sodium). This includes fresh or frozen fruits and vegetables, fish, lean meats, fat-free or low-fat dairy foods, whole grains, and high-fiber foods. Lentils and dried peas and beans (legumes) are also good choices.  · Limit salt if told by your doctor.  · Cook in a healthy way. Roast, grill, broil, bake, poach, steam, or stir-cisneros foods.  · Limit fluids as told by your doctor.  · Weigh yourself every morning. Do this after you pee (urinate) and before you eat breakfast. Write down your weight to give to your doctor.  · Take your blood pressure and write it down if your doctor tells you to.  · Ask your doctor how to check your pulse. Check your pulse as told.  · Lose weight if told by your doctor.  · Stop smoking or chewing tobacco. Do not use gum or patches that help you quit without your doctor's approval.  · Schedule and go to doctor visits  as told.  · Nonpregnant women should have no more than 1 drink a day. Men should have no more than 2 drinks a day. Talk to your doctor about drinking alcohol.  · Stop illegal drug use.  · Stay current with shots (immunizations).  · Manage your health conditions as told by your doctor.  · Learn to manage your stress.  · Rest when you are tired.  · If it is really hot outside:  ¨ Avoid intense activities.  ¨ Use air conditioning or fans, or get in a cooler place.  ¨ Avoid caffeine and alcohol.  ¨ Wear loose-fitting, lightweight, and light-colored clothing.  · If it is really cold outside:  ¨ Avoid intense activities.  ¨ Layer your clothing.  ¨ Wear mittens or gloves, a hat, and a scarf when going outside.  ¨ Avoid alcohol.  · Learn about heart failure and get support as needed.  · Get help to maintain or improve your quality of life and your ability to care for yourself as needed.  GET HELP IF:   · You gain weight quickly.  · You are more short of breath than usual.  · You cannot do your normal activities.  · You tire easily.  · You cough more than normal, especially with activity.  · You have any or more puffiness (swelling) in areas such as your hands, feet, ankles, or belly (abdomen).  · You cannot sleep because it is hard to breathe.  · You feel like your heart is beating fast (palpitations).  · You get dizzy or light-headed when you stand up.  GET HELP RIGHT AWAY IF:   · You have trouble breathing.  · There is a change in mental status, such as becoming less alert or not being able to focus.  · You have chest pain or discomfort.  · You faint.  MAKE SURE YOU:   · Understand these instructions.  · Will watch your condition.  · Will get help right away if you are not doing well or get worse.  This information is not intended to replace advice given to you by your health care provider. Make sure you discuss any questions you have with your health care provider.  Document Released: 09/26/2009 Document Revised:  01/08/2016 Document Reviewed: 02/03/2014  [employer] Patient Education © 2017 [employer]      Community-Acquired Pneumonia, Adult  Pneumonia is an infection of the lungs. There are different types of pneumonia. One type can develop while a person is in a hospital. A different type, called community-acquired pneumonia, develops in people who are not, or have not recently been, in the hospital or other health care facility.  What are the causes?  Pneumonia may be caused by bacteria, viruses, or funguses. Community-acquired pneumonia is often caused by Streptococcus pneumonia bacteria. These bacteria are often passed from one person to another by breathing in droplets from the cough or sneeze of an infected person.  What increases the risk?  The condition is more likely to develop in:  · People who have chronic diseases, such as chronic obstructive pulmonary disease (COPD), asthma, congestive heart failure, cystic fibrosis, diabetes, or kidney disease.  · People who have early-stage or late-stage HIV.  · People who have sickle cell disease.  · People who have had their spleen removed (splenectomy).  · People who have poor dental hygiene.  · People who have medical conditions that increase the risk of breathing in (aspirating) secretions their own mouth and nose.  · People who have a weakened immune system (immunocompromised).  · People who smoke.  · People who travel to areas where pneumonia-causing germs commonly exist.  · People who are around animal habitats or animals that have pneumonia-causing germs, including birds, bats, rabbits, cats, and farm animals.  What are the signs or symptoms?  Symptoms of this condition include:  · A dry cough.  · A wet (productive) cough.  · Fever.  · Sweating.  · Chest pain, especially when breathing deeply or coughing.  · Rapid breathing or difficulty breathing.  · Shortness of breath.  · Shaking chills.  · Fatigue.  · Muscle aches.  How is this diagnosed?  Your health  care provider will take a medical history and perform a physical exam. You may also have other tests, including:  · Imaging studies of your chest, including X-rays.  · Tests to check your blood oxygen level and other blood gases.  · Other tests on blood, mucus (sputum), fluid around your lungs (pleural fluid), and urine.  If your pneumonia is severe, other tests may be done to identify the specific cause of your illness.  How is this treated?  The type of treatment that you receive depends on many factors, such as the cause of your pneumonia, the medicines you take, and other medical conditions that you have. For most adults, treatment and recovery from pneumonia may occur at home. In some cases, treatment must happen in a hospital. Treatment may include:  · Antibiotic medicines, if the pneumonia was caused by bacteria.  · Antiviral medicines, if the pneumonia was caused by a virus.  · Medicines that are given by mouth or through an IV tube.  · Oxygen.  · Respiratory therapy.  Although rare, treating severe pneumonia may include:  · Mechanical ventilation. This is done if you are not breathing well on your own and you cannot maintain a safe blood oxygen level.  · Thoracentesis. This procedure removes fluid around one lung or both lungs to help you breathe better.  Follow these instructions at home:  · Take over-the-counter and prescription medicines only as told by your health care provider.  ¨ Only take cough medicine if you are losing sleep. Understand that cough medicine can prevent your body’s natural ability to remove mucus from your lungs.  ¨ If you were prescribed an antibiotic medicine, take it as told by your health care provider. Do not stop taking the antibiotic even if you start to feel better.  · Sleep in a semi-upright position at night. Try sleeping in a reclining chair, or place a few pillows under your head.  · Do not use tobacco products, including cigarettes, chewing tobacco, and e-cigarettes. If  you need help quitting, ask your health care provider.  · Drink enough water to keep your urine clear or pale yellow. This will help to thin out mucus secretions in your lungs.  How is this prevented?  There are ways that you can decrease your risk of developing community-acquired pneumonia. Consider getting a pneumococcal vaccine if:  · You are older than 65 years of age.  · You are older than 19 years of age and are undergoing cancer treatment, have chronic lung disease, or have other medical conditions that affect your immune system. Ask your health care provider if this applies to you.  There are different types and schedules of pneumococcal vaccines. Ask your health care provider which vaccination option is best for you.  You may also prevent community-acquired pneumonia if you take these actions:  · Get an influenza vaccine every year. Ask your health care provider which type of influenza vaccine is best for you.  · Go to the dentist on a regular basis.  · Wash your hands often. Use hand  if soap and water are not available.  Contact a health care provider if:  · You have a fever.  · You are losing sleep because you cannot control your cough with cough medicine.  Get help right away if:  · You have worsening shortness of breath.  · You have increased chest pain.  · Your sickness becomes worse, especially if you are an older adult or have a weakened immune system.  · You cough up blood.  This information is not intended to replace advice given to you by your health care provider. Make sure you discuss any questions you have with your health care provider.  Document Released: 12/18/2006 Document Revised: 04/27/2017 Document Reviewed: 04/13/2016  [employer] Interactive Patient Education © 2017 [employer]

## 2019-08-10 NOTE — PROGRESS NOTES
Inpatient Anticoagulation Service Note    Date: 8/10/2019    Reason for Anticoagulation: Stroke, Mitral Valve Repair(anitphospholipid syndrome )   Target INR: 2.0 to 3.0         Hemoglobin Value: (!) 8.7  Hematocrit Value: (!) 27.6    INR from last 7 days     Date/Time INR Value    08/10/19 0013  (!) 1.92    08/09/19 0014  (!) 2.05    08/08/19 0002  (!) 1.93    08/07/19 0336  (!) 1.47        Dose from last 7 days     Date/Time Dose (mg)    08/10/19 0838  1.5    08/09/19 0014  1    08/08/19 0841  1    08/07/19 1440  1        Average Dose (mg): 1  Significant Interactions: Antibiotics  Bridge Therapy: No     Reversal Agent Administered: Not Applicable  Comments: Patient admitted acute exacerbation of CHF after 2 days of new start Sutent for recent diagnosis of GI stromal tumor.  Patient is on warfarin for h/o CVA, mitral valve repair, and antiphospholipid syndrome and is followed by the [facility]. H/H/Plts stabilizing . There are no overt s/sx of bleeding noted per chart review.  Ceftriaxone changed to Augmentin today.  INR is slightly sub-therapeutic today.  Will increase warfarin to 1.5 mg po tonight and repeat INR in AM.      Plan:  Will increase warfarin to 1.5 mg po tonight and repeat INR in AM.    Education Material Provided?: No  Pharmacist suggested discharge dosing: Consider warfarin 1.5 mg po tonight followed by 1mg po qday with follow up to [facility] within 72 hours of discharge.      Carie Tyson, PharmD, BCOP

## 2019-08-10 NOTE — PROGRESS NOTES
Assumed care of pt @ 0700. A&Ox4. No family present. SBA. L PIV SL locked, flushed. No s/s of infiltration/ infection. Titrated pt off O2, satting at 96% on RA.  in place. POC discussed with pt. Home O2 assessment and a shower are the goals for this RN & pt. All needs met. Call light & belongings in reach. Bed locked & lowest position. Bed alarm on. Pt sitting up in chair eating. Monitoring & recording strict I&O's.

## 2019-08-10 NOTE — CARE PLAN
Problem: Fluid Volume:  Goal: Will maintain balanced intake and output  Outcome: PROGRESSING AS EXPECTED  Note:   Strict I&Os. Sodium restricted diet. Monitoring for imbalance.      Problem: Venous Thromboembolism (VTW)/Deep Vein Thrombosis (DVT) Prevention:  Goal: Patient will participate in Venous Thrombosis (VTE)/Deep Vein Thrombosis (DVT)Prevention Measures  Outcome: PROGRESSING AS EXPECTED  Note:   Warfarin ordered, at therapeutic level. Pt educated to ambulate to decrease risk. Pt shows understanding.      Problem: Psychosocial Needs:  Goal: Level of anxiety will decrease  Outcome: PROGRESSING SLOWER THAN EXPECTED  Note:   Pt anxious about home situation with wife and her prognosis. Eager to D/C home.

## 2019-08-11 NOTE — PROGRESS NOTES
Pt D/C to home with family via car. Escort provided. Pt education regarding d/c instructions provided to pt & family in detail. Pt sent home with HF booklet. Pt & family verbally agree they understand d/c instructions & when to follow up. All pt belongings gathered, nothing left behind. PIV removed.

## 2019-08-12 ENCOUNTER — ANTICOAGULATION VISIT (OUTPATIENT)
Dept: MEDICAL GROUP | Facility: PHYSICIAN GROUP | Age: 64
End: 2019-08-12
Payer: COMMERCIAL

## 2019-08-12 ENCOUNTER — APPOINTMENT (OUTPATIENT)
Dept: RADIOLOGY | Facility: MEDICAL CENTER | Age: 64
End: 2019-08-12
Attending: INTERNAL MEDICINE
Payer: COMMERCIAL

## 2019-08-12 ENCOUNTER — TELEPHONE (OUTPATIENT)
Dept: CARDIOLOGY | Facility: MEDICAL CENTER | Age: 64
End: 2019-08-12

## 2019-08-12 VITALS — SYSTOLIC BLOOD PRESSURE: 93 MMHG | HEART RATE: 80 BPM | DIASTOLIC BLOOD PRESSURE: 60 MMHG

## 2019-08-12 DIAGNOSIS — Z98.890 S/P MITRAL VALVE REPAIR: ICD-10-CM

## 2019-08-12 DIAGNOSIS — I63.9 CEREBROVASCULAR ACCIDENT (CVA), UNSPECIFIED MECHANISM (HCC): ICD-10-CM

## 2019-08-12 LAB
BACTERIA BLD CULT: NORMAL
BACTERIA BLD CULT: NORMAL
INR PPP: 2.2 (ref 2–3.5)
SIGNIFICANT IND 70042: NORMAL
SIGNIFICANT IND 70042: NORMAL
SITE SITE: NORMAL
SITE SITE: NORMAL
SOURCE SOURCE: NORMAL
SOURCE SOURCE: NORMAL

## 2019-08-12 PROCEDURE — 85610 PROTHROMBIN TIME: CPT | Performed by: NURSE PRACTITIONER

## 2019-08-12 PROCEDURE — 93793 ANTICOAG MGMT PT WARFARIN: CPT | Performed by: INTERNAL MEDICINE

## 2019-08-12 NOTE — TELEPHONE ENCOUNTER
Called and expressed my condolences of all his recent medial setbacks and also his wife, encouraged him to reach out as needed    Thank you

## 2019-08-12 NOTE — PROGRESS NOTES
Anticoagulation Summary  As of 2019    INR goal:   2.0-3.0   TTR:   73.8 % (4 y)   INR used for dosin.20 (2019)   Warfarin maintenance plan:   1.5 mg (1 mg x 1.5) every Sat; 1 mg (1 mg x 1) all other days   Weekly warfarin total:   7.5 mg   Plan last modified:   Markell Stanford, PharmD (2019)   Next INR check:   2019   Target end date:   Indefinite    Indications    CVA (cerebral vascular accident) (HCC) [I63.9]  S/P mitral valve repair [Z98.890]  Transient cerebral ischemia (Resolved) [G45.9]             Anticoagulation Episode Summary     INR check location:   Anticoagulation Clinic    Preferred lab:       Send INR reminders to:       Comments:   [place]      Anticoagulation Care Providers     Provider Role Specialty Phone number    Dudley Warner M.D. Referring Cardiology [phone]    Ramiro YepezD Responsible          Anticoagulation Patient Findings  Patient Findings     Positives:   Change in health, Hospital admission    Negatives:   Signs/symptoms of thrombosis, Signs/symptoms of bleeding, Laboratory test error suspected, Change in alcohol use, Change in activity, Upcoming invasive procedure, Emergency department visit, Upcoming dental procedure, Missed doses, Extra doses, Change in medications, Change in diet/appetite, Bruising, Other complaints    Comments:   See below        HPI:   Roque Nguyen seen in clinic today, on anticoagulation therapy with warfarin for stroke prevention due to history of CVA, TIA, and mitral valve repair.    Patient's previous INR was subtherapeutic at 1.92 on 8-10-19 (inpatient), at which time patient was instructed to continue with home warfarin dosing.  He returns to clinic today to recheck INR to ensure it is therapeutic and thus preventing possible clotting and/or bleeding/bruising complications.    CHADS-VASc = n/a  (unadjusted ischemic stroke risk/year:  n/a)    Does patient have any changes to current medical/health status  "since last appt (Y/N):  Hospitalized for severe, sudden SOB following dose of oral chemo agent Sutent.  ECHO showed deterioration of EF as well.  Does patient have any signs/symptoms of bleeding and/or thrombosis since the last appt (Y/N):  NO  Does patient have any interval changes to diet or medications since last appt (Y/N):  Two days of augmentin given at discharge.  Are there any complications or cost restrictions with current therapy (Y/N):  NO     Does patient have [facility] PCP? YES, Dr Tai Fitch (If not, please document discussion that patient must be seen at [facility])       Vitals:  BP 93/60  HR 80    Weight  declines   Height   5' 7\"     Asssessment:      INR therapeutic at 2.2, therefore decreasing patient's risk of stroke and/or bleeding complications.   Reason(s) for out of range INR today:  n/a      Plan:  Instructed patient to increase weekly warfarin regimen as detailed above in order to reflect dosing over the last seven days and maintain INR in therapeutic range.     Follow up:  Because warfarin is a high risk medication and current CHEST guidelines recommend regular monitoring intervals (few days up to 12 weeks), will have patient return to clinic in 1 weeks to recheck INR.    Markell Stanford, PharmD, BCACP    "

## 2019-08-13 ENCOUNTER — TELEPHONE (OUTPATIENT)
Dept: CARDIOLOGY | Facility: MEDICAL CENTER | Age: 64
End: 2019-08-13

## 2019-08-13 ENCOUNTER — OFFICE VISIT (OUTPATIENT)
Dept: MEDICAL GROUP | Facility: PHYSICIAN GROUP | Age: 64
End: 2019-08-13
Payer: COMMERCIAL

## 2019-08-13 VITALS
SYSTOLIC BLOOD PRESSURE: 78 MMHG | OXYGEN SATURATION: 93 % | BODY MASS INDEX: 28.77 KG/M2 | WEIGHT: 179 LBS | RESPIRATION RATE: 16 BRPM | DIASTOLIC BLOOD PRESSURE: 46 MMHG | TEMPERATURE: 97.1 F | HEIGHT: 66 IN | HEART RATE: 92 BPM

## 2019-08-13 DIAGNOSIS — I50.9 CONGESTIVE HEART FAILURE, UNSPECIFIED HF CHRONICITY, UNSPECIFIED HEART FAILURE TYPE (HCC): ICD-10-CM

## 2019-08-13 PROCEDURE — 99214 OFFICE O/P EST MOD 30 MIN: CPT | Performed by: INTERNAL MEDICINE

## 2019-08-13 NOTE — PROGRESS NOTES
PRIMARY CARE CLINIC FOLLOW UP VISIT  Chief Complaint   Patient presents with   • Congestive Heart Failure   • Pneumonia       History of Present Illness     Here with his wife:     CHF (congestive heart failure) (HCC)  Roque was just recently admitted for new onset CHF in the setting of his new chemotherapy with EF declined to 15%. He was evaluated by cardiology during hospitalization and started on HF medication regimen. Also evaluated by oncology and his chemotherapy was stopped, has been referred to [place] for further opinion . He has been compliant with his HF regimen and denies dizziness and lightheadedness. Also had treatment for CAP pneumonia.       Current Outpatient Medications   Medication Sig Dispense Refill   • lisinopril (PRINIVIL) 2.5 MG Tab Take 1 Tab by mouth every day. 30 Tab 3   • spironolactone (ALDACTONE) 25 MG Tab Take 0.5 Tabs by mouth every day. 30 Tab 3   • senna-docusate (PERICOLACE OR SENOKOT S) 8.6-50 MG Tab Take 2 Tabs by mouth every day. 60 Tab 0   • oxyCODONE immediate-release (ROXICODONE) 5 MG Tab Take 1 Tab by mouth every 6 hours as needed for Severe Pain for up to 7 days. 28 Tab 0   • warfarin (COUMADIN) 1 MG Tab Take 1 mg by mouth every day. INR goal:   2.0-3.0  TTR:   73.8 % (4 y)  INR used for dosin.10 (2019)  Warfarin maintenance plan:   1 mg (1 mg x 1) every day  Weekly warfarin total:   7 mg     • carvedilol (COREG) 12.5 MG Tab Take 1 Tab by mouth 2 times a day, with meals. 180 Tab 3   • lacosamide (VIMPAT) 200 MG Tab tablet Take 200 mg by mouth 2 Times a Day for 181 days. 180 Tab 5   • levetiracetam (KEPPRA) 1000 MG tablet Take 2 Tabs by mouth 2 Times a Day. 120 Tab 11   • multivitamin (THERAGRAN) Tab Take 1 Tab by mouth every day.       No current facility-administered medications for this visit.      Past Medical History:   Diagnosis Date   • Antiphospholipid syndrome (HCC) 3/15/2010   • ASTHMA    • Cancer (HCC)     Prostate cancer   • Chronic  anticoagulation    • Congestive heart failure (HCC)    • CVA (cerebral vascular accident) ([facility]) 2015   • Dental disorder     Broken   • Epilepsy ([facility]) 2018   • Hemorrhagic disorder ([facility])    • Hypertension     On Lisinopril   • LBBB (left bundle branch block)    • LV dysfunction 2015    Echocardiogram with normal LV size, LVEF 40%. Severely dilated LA. Severe MR, mild TR. RVSP 45mmHg   • Mitral regurgitation 2015    Echocardiogram with severe MR   • S/P mitral valve repair    • Seizure disorder ([facility])     Initial diagnosis, last seizure in ; followed by neurology, on [facility].   • Snoring    • TIA (transient ischemic attack)     Antiphospholipid antibody   • Tremors of nervous system     mostly left sided     Past Surgical History:   Procedure Laterality Date   • MITRAL VALVE REPAIR  2015    Procedure: MITRAL VALVE REPAIR  with MICHAEL;  Surgeon: Gina Sim M.D.;  Location: SURGERY [facility];  Service:    • RECOVERY  5/15/2015    Procedure: [id number]  MITRAL REGURGITATION;  Surgeon: Ir-Recovery Surgery;  Location: SURGERY [facility] DAY [facility];  Service:    • INGUINAL HERNIA REPAIR  2010    Performed by EJ STILL at SURGERY SAME DAY [facility] ORS   • CHOLECYSTECTOMY       Social History     Tobacco Use   • Smoking status: Former Smoker     Packs/day: 1.00     Years: 37.00     Pack years: 37.00     Types: Cigarettes     Start date: 1985     Last attempt to quit: 2012     Years since quittin.0   • Smokeless tobacco: Never Used   Substance Use Topics   • Alcohol use: Yes     Alcohol/week: 0.0 oz     Comment: occ   • Drug use: No     Social History     Social History Narrative    Retired from [employer]     Family History   Problem Relation Age of Onset   • Cancer Paternal Uncle         Lung cancer   • Lung Disease Mother    • Heart Disease Mother    • Hypertension Mother    • Diabetes Father    • Heart Disease Father    • Psychiatric Illness  "Sister    • Hypertension Sister    • Lung Disease Paternal Aunt    • Hypertension Paternal Aunt    • Psychiatric Illness Maternal Grandfather    • Stroke Neg Hx      Family Status   Relation Name Status   • PUnc     • Mo     • Fa  Alive   • Sis  Alive   • MAunt  Alive   • MUnc  Alive   • PAunt  Alive   • MGMo     • MGFa     • PGMo     • PGFa     • Neg Hx  (Not Specified)     Allergies: Patient has no known allergies.    ROS  As per HPI above. All other systems reviewed and negative.        Objective   BP (!) 78/46   Pulse 92   Temp 36.2 °C (97.1 °F)   Resp 16   Ht 1.676 m (5' 6\")   Wt 81.2 kg (179 lb)   SpO2 93%  Body mass index is 28.89 kg/m².    General: alert and oriented, pleasant, cooperative  HEENT: Normocephalic, atraumatic. No thyroid masses. Oropharynx clear without exudate or injection.   Cardiovascular: regular rate and rhythm, normal S1/S2  Pulmonary: lungs clear to auscultation bilaterally  Extremities: 1+ bilateral lower extremity pitting edema   Skin: warm and dry, no lesions or rashes  Psychiatric: appropriate mood and affect. Good insight and appropriate judgment     Assessment and Plan   The following treatment plan was discussed     1. Congestive heart failure, unspecified HF chronicity, unspecified heart failure type (HCC)  Given his persistent hypotension throughout today's visit advised to hold lisinopril, spironolactone, and coreg until he sees cardiology in 2 days on 8/15. At that time can resume some at very low doses. Although he denies lightheadedness/dizziness he does not recall his appointment with coumadin clinic yesterday.       Healthcare maintenance     Health Maintenance Due   Topic Date Due   • HEPATITIS C SCREENING  1955   • IMM PNEUMOCOCCAL VACCINE: 65+ Years (1 of 2 - PCV13) 2019       No follow-ups on file.    Tai Fitch MD  Internal Medicine  [facility]                   "

## 2019-08-13 NOTE — PATIENT INSTRUCTIONS
Please hold your coreg (carvedilol), spironolactone, and lisinopril until you see cardiologist on 8/15

## 2019-08-13 NOTE — ASSESSMENT & PLAN NOTE
Roque was just recently admitted for new onset CHF in the setting of his new chemotherapy with EF declined to 15%. He was evaluated by cardiology during hospitalization and started on HF medication regimen. Also evaluated by oncology and his chemotherapy was stopped, has been referred to [place] for further opinion 8/30. He has been compliant with his HF regimen and denies dizziness and lightheadedness. Also had treatment for CAP pneumonia.

## 2019-08-15 ENCOUNTER — OFFICE VISIT (OUTPATIENT)
Dept: CARDIOLOGY | Facility: MEDICAL CENTER | Age: 64
End: 2019-08-15
Payer: COMMERCIAL

## 2019-08-15 VITALS
HEART RATE: 95 BPM | HEIGHT: 66 IN | WEIGHT: 180.4 LBS | SYSTOLIC BLOOD PRESSURE: 90 MMHG | DIASTOLIC BLOOD PRESSURE: 60 MMHG | BODY MASS INDEX: 28.99 KG/M2 | OXYGEN SATURATION: 93 %

## 2019-08-15 DIAGNOSIS — C49.A9 GASTROINTESTINAL STROMAL TUMOR OF OTHER SITES (HCC): ICD-10-CM

## 2019-08-15 DIAGNOSIS — I51.89 LEFT VENTRICULAR SYSTOLIC DYSFUNCTION, NYHA CLASS 3: ICD-10-CM

## 2019-08-15 DIAGNOSIS — Z79.899 HIGH RISK MEDICATION USE: ICD-10-CM

## 2019-08-15 DIAGNOSIS — D68.61 PRIMARY ANTIPHOSPHOLIPID SYNDROME (HCC): ICD-10-CM

## 2019-08-15 DIAGNOSIS — I50.30 ACC/AHA STAGE C HEART FAILURE WITH PRESERVED EJECTION FRACTION (HCC): ICD-10-CM

## 2019-08-15 DIAGNOSIS — I63.9 CEREBROVASCULAR ACCIDENT (CVA), UNSPECIFIED MECHANISM (HCC): ICD-10-CM

## 2019-08-15 DIAGNOSIS — Z79.01 CHRONIC ANTICOAGULATION: ICD-10-CM

## 2019-08-15 DIAGNOSIS — Z98.890 S/P MITRAL VALVE REPAIR: ICD-10-CM

## 2019-08-15 DIAGNOSIS — I51.9 LV DYSFUNCTION: ICD-10-CM

## 2019-08-15 PROCEDURE — 99215 OFFICE O/P EST HI 40 MIN: CPT | Mod: 25 | Performed by: INTERNAL MEDICINE

## 2019-08-15 PROCEDURE — 94618 PULMONARY STRESS TESTING: CPT | Performed by: INTERNAL MEDICINE

## 2019-08-15 RX ORDER — POTASSIUM CHLORIDE 750 MG/1
10 TABLET, EXTENDED RELEASE ORAL DAILY
Qty: 30 TAB | Refills: 3 | Status: SHIPPED | OUTPATIENT
Start: 2019-08-15

## 2019-08-15 RX ORDER — TORSEMIDE 5 MG/1
5 TABLET ORAL DAILY
Qty: 30 TAB | Refills: 3 | Status: SHIPPED | OUTPATIENT
Start: 2019-08-15

## 2019-08-15 ASSESSMENT — MINNESOTA LIVING WITH HEART FAILURE QUESTIONNAIRE (MLHF)
DIFFICULTY WITH SEXUAL ACTIVITIES: 2
LOSS OF SELF CONTROL IN YOUR LIFE: 0
DIFFICULTY GOING AWAY FROM HOME: 1
TIRED, FATIGUED OR LOW ON ENERGY: 1
HAVING TO SIT OR LIE DOWN DURING THE DAY: 2
WALKING ABOUT OR CLIMBING STAIRS DIFFICULT: 2
TOTAL_SCORE: 23
MAKING YOU SHORT OF BREATH: 2
DIFFICULTY TO CONCENTRATE OR REMEMBERING THINGS: 0
SWELLING IN ANKLES OR LEGS: 2
DIFFICULTY SLEEPING WELL AT NIGHT: 1
DIFFICULTY WORKING TO EARN A LIVING: 0
EATING LESS FOODS YOU LIKE: 0
DIFFICULTY WITH RECREATIONAL PASTIMES, SPORTS, HOBBIES: 3
WORKING AROUND THE HOUSE OR YARD DIFFICULT: 2
MAKING YOU WORRY: 1
FEELING LIKE A BURDEN TO FAMILY AND FRIENDS: 0
GIVING YOU SIDE EFFECTS FROM TREATMENTS: 0
MAKING YOU STAY IN A HOSPITAL: 2
DIFFICULTY SOCIALIZING WITH FAMILY OR FRIENDS: 1
MAKING YOU FEEL DEPRESSED: 0
COSTING YOU MONEY FOR MEDICAL CARE: 1

## 2019-08-15 ASSESSMENT — ENCOUNTER SYMPTOMS
DIAPHORESIS: 0
ORTHOPNEA: 0
VOMITING: 0
EYE DISCHARGE: 0
HALLUCINATIONS: 0
DIZZINESS: 0
BLURRED VISION: 0
BLOOD IN STOOL: 0
PALPITATIONS: 0
PND: 0
MYALGIAS: 0
CHILLS: 0
MEMORY LOSS: 0
SPEECH CHANGE: 0
NAUSEA: 0
ABDOMINAL PAIN: 0
SENSORY CHANGE: 0
FALLS: 0
COUGH: 0
WEIGHT LOSS: 0
SHORTNESS OF BREATH: 1
EYE PAIN: 0
DOUBLE VISION: 0
HEADACHES: 0
LOSS OF CONSCIOUSNESS: 0
FEVER: 0
BRUISES/BLEEDS EASILY: 0
CLAUDICATION: 0
DEPRESSION: 0

## 2019-08-15 ASSESSMENT — 6 MINUTE WALK TEST (6MWT): TOTAL DISTANCE WALKED (METERS): 73

## 2019-08-15 NOTE — PROGRESS NOTES
Chief Complaint   Patient presents with   • Congestive Heart Failure       Subjective:   Roque Nguyen is a 64 y.o. male who presents today for cardiac care and management after his recent hospitalization.  During his hospital stay, patient was found to have an acute drop of left ventricular systolic function from 50% (05/2019 study) to 15% (08/2019 study) after chemotherapy.  He was getting chemotherapy for his gastrointestinal stromal tumor.  His chemo regimen has been held due to possible cardiotoxicity.    His primary cardiologist is Dr. Warner.  He does have a prior history of mitral valve repair.  Also history of antiphospholipid syndrome on chronic anticoagulation.    Patient still gets winded with daily living activities and exertion. No symptoms at rest.    08/15/2019 Patient was able to complete 73 m during his 6 minute walk test. his O2 saturation at baseline was 93% and at the end of the test, the O2 saturation was 95%. he reported 2 level of dyspnea on Jake scale.      Past Medical History:   Diagnosis Date   • Antiphospholipid syndrome ([facility]) 3/15/2010   • ASTHMA    • Cancer ([facility]) 2014    Prostate cancer   • Chronic anticoagulation    • Congestive heart failure ([facility])    • CVA (cerebral vascular accident) ([facility]) 7/28/2015   • Dental disorder     Broken   • Epilepsy ([facility]) 5/14/2018   • Hemorrhagic disorder ([facility])    • Hypertension     On Lisinopril   • LBBB (left bundle branch block)    • LV dysfunction April 2015    Echocardiogram with normal LV size, LVEF 40%. Severely dilated LA. Severe MR, mild TR. RVSP 45mmHg   • Mitral regurgitation April 2015    Echocardiogram with severe MR   • S/P mitral valve repair    • Seizure disorder ([facility]) 2007    Initial diagnosis, last seizure in 2012; [facility]   • Snoring    • TIA (transient ischemic attack) 2007/2008    Antiphospholipid antibody   • Tremors of nervous system     mostly left sided     Past Surgical History:   Procedure  Laterality Date   • MITRAL VALVE REPAIR  2015    Procedure: MITRAL VALVE REPAIR  with MICHAEL;  Surgeon: Gina iSm M.D.;  Location: SURGERY [facility];  Service:    • RECOVERY  5/15/2015    Procedure: [id number]  MITRAL REGURGITATION;  Surgeon: Ir-Recovery Surgery;  Location: SURGERY SAME DAY [facility];  Service:    • INGUINAL HERNIA REPAIR  2010    Performed by EJ STILL at SURGERY SAME DAY [facility] ORS   • CHOLECYSTECTOMY       Family History   Problem Relation Age of Onset   • Cancer Paternal Uncle         Lung cancer   • Lung Disease Mother    • Heart Disease Mother    • Hypertension Mother    • Diabetes Father    • Heart Disease Father    • Psychiatric Illness Sister    • Hypertension Sister    • Lung Disease Paternal Aunt    • Hypertension Paternal Aunt    • Psychiatric Illness Maternal Grandfather    • Stroke Neg Hx      Social History     Socioeconomic History   • Marital status:      Spouse name: Not on file   • Number of children: 2   • Years of education: Not on file   • Highest education level: Not on file   Occupational History   • Not on file   Social Needs   • Financial resource strain: Not on file   • Food insecurity:     Worry: Not on file     Inability: Not on file   • Transportation needs:     Medical: Not on file     Non-medical: Not on file   Tobacco Use   • Smoking status: Former Smoker     Packs/day: 1.00     Years: 37.00     Pack years: 37.00     Types: Cigarettes     Start date: 1985     Last attempt to quit: 2012     Years since quittin.0   • Smokeless tobacco: Never Used   Substance and Sexual Activity   • Alcohol use: Yes     Alcohol/week: 0.0 oz     Comment: occ   • Drug use: No   • Sexual activity: Yes     Partners: Female     Comment: , retired [employer]   Lifestyle   • Physical activity:     Days per week: Not on file     Minutes per session: Not on file   • Stress: Not on file   Relationships   • Social connections:     Talks  on phone: Not on file     Gets together: Not on file     Attends Shinto service: Not on file     Active member of club or organization: Not on file     Attends meetings of clubs or organizations: Not on file     Relationship status: Not on file   • Intimate partner violence:     Fear of current or ex partner: Not on file     Emotionally abused: Not on file     Physically abused: Not on file     Forced sexual activity: Not on file   Other Topics Concern   • Not on file   Social History Narrative    Retired from [employer]     No Known Allergies  Outpatient Encounter Medications as of 8/15/2019   Medication Sig Dispense Refill   • torsemide (DEMADEX) 5 MG Tab Take 1 Tab by mouth every day. 30 Tab 3   • potassium chloride SA (K-DUR) 10 MEQ Tab CR Take 1 Tab by mouth every day. 30 Tab 3   • lisinopril (PRINIVIL) 2.5 MG Tab Take 1 Tab by mouth every day. 30 Tab 3   • spironolactone (ALDACTONE) 25 MG Tab Take 0.5 Tabs by mouth every day. 30 Tab 3   • senna-docusate (PERICOLACE OR SENOKOT S) 8.6-50 MG Tab Take 2 Tabs by mouth every day. 60 Tab 0   • oxyCODONE immediate-release (ROXICODONE) 5 MG Tab Take 1 Tab by mouth every 6 hours as needed for Severe Pain for up to 7 days. 28 Tab 0   • warfarin (COUMADIN) 1 MG Tab Take 1 mg by mouth every day. INR goal:   2.0-3.0  TTR:   73.8 % (4 y)  INR used for dosin.10 (2019)  Warfarin maintenance plan:   1 mg (1 mg x 1) every day  Weekly warfarin total:   7 mg     • carvedilol (COREG) 12.5 MG Tab Take 1 Tab by mouth 2 times a day, with meals. 180 Tab 3   • lacosamide (VIMPAT) 200 MG Tab tablet Take 200 mg by mouth 2 Times a Day for 181 days. 180 Tab 5   • levetiracetam (KEPPRA) 1000 MG tablet Take 2 Tabs by mouth 2 Times a Day. 120 Tab 11   • multivitamin (THERAGRAN) Tab Take 1 Tab by mouth every day.       No facility-administered encounter medications on file as of 8/15/2019.      Review of Systems   Constitutional: Negative for chills, diaphoresis, fever, malaise/fatigue  "and weight loss.   HENT: Negative for ear discharge, ear pain, hearing loss and nosebleeds.    Eyes: Negative for blurred vision, double vision, pain and discharge.   Respiratory: Positive for shortness of breath. Negative for cough.    Cardiovascular: Negative for chest pain, palpitations, orthopnea, claudication, leg swelling and PND.   Gastrointestinal: Negative for abdominal pain, blood in stool, melena, nausea and vomiting.   Genitourinary: Negative for dysuria, frequency and hematuria.   Musculoskeletal: Negative for falls, joint pain and myalgias.   Skin: Negative for itching and rash.   Neurological: Negative for dizziness, sensory change, speech change, loss of consciousness and headaches.   Endo/Heme/Allergies: Negative for environmental allergies. Does not bruise/bleed easily.   Psychiatric/Behavioral: Negative for depression, hallucinations, memory loss and suicidal ideas.        Objective:   BP (!) 90/60 (BP Location: Left arm, Patient Position: Sitting, BP Cuff Size: Adult)   Pulse 95   Ht 1.676 m (5' 6\")   Wt 81.8 kg (180 lb 6.4 oz)   SpO2 93%   BMI 29.12 kg/m²     Physical Exam   Constitutional: He is oriented to person, place, and time. No distress.   HENT:   Head: Normocephalic and atraumatic.   Right Ear: External ear normal.   Left Ear: External ear normal.   Eyes: Right eye exhibits no discharge. Left eye exhibits no discharge.   Neck: No JVD present. No thyromegaly present.   Cardiovascular: Normal rate, regular rhythm, normal heart sounds and intact distal pulses. Exam reveals no gallop and no friction rub.   No murmur heard.  Pulmonary/Chest: No respiratory distress. He has rales.   Abdominal: Bowel sounds are normal. He exhibits no distension. There is no tenderness.   Musculoskeletal: He exhibits edema. He exhibits no tenderness.   Neurological: He is alert and oriented to person, place, and time. No cranial nerve deficit.   Skin: Skin is warm and dry. He is not diaphoretic. "   Psychiatric: He has a normal mood and affect. His behavior is normal.   Nursing note and vitals reviewed.      Assessment:     1. LV dysfunction  Basic Metabolic Panel   2. Gastrointestinal stromal tumor of other sites (HCC)  Basic Metabolic Panel   3. Primary antiphospholipid syndrome (HCC)  Basic Metabolic Panel   4. Chronic anticoagulation     5. Cerebrovascular accident (CVA), unspecified mechanism (HCC)     6. S/P mitral valve repair  Basic Metabolic Panel   7. High risk medication use     8. ACC/AHA stage C heart failure with preserved ejection fraction (HCC)     9. Left ventricular systolic dysfunction, NYHA class 3         Medical Decision Making:  Today's Assessment / Status / Plan:   At this time, patient is still in acute volume overloaded state.  He has water retention in his legs with pitting edema along with crackles on exam with pulmonary auscultation.  His blood pressure is borderline however.  Therefore I will employed diuresis with caution with torsemide 5 mg p.o. once a day.  Also potassium 10 M EQ once a day.  Patient will need to be closely monitor with blood test in terms of renal function and electrolytes.  I advised him to make an appointment to see Dr. Warner who is his primary cardiologist in 3 weeks with blood test for follow-up.  If he is not able to get in then he will be seeing 1 of our nurse practitioners in a heart failure program.  In the future, he will resume care with Dr. Warner as his primary cardiologist.    Based on his borderline blood pressure, I cannot titrate up his heart failure regimen at this time.  We will continue current lisinopril at 2.5 mg once a day, spironolactone at 12.5 mg once a day, carvedilol at 12.5 mg twice a day.

## 2019-08-15 NOTE — PATIENT INSTRUCTIONS
Will start Torsemide 5 mg once a day.    Will start Potassium 10 meq once a day.    Will need to come back 3 weeks with blood test to see Dr. Warner (if cannot get it, then see Nurse Practitioner in HF)

## 2019-08-19 ENCOUNTER — ANTICOAGULATION VISIT (OUTPATIENT)
Dept: MEDICAL GROUP | Facility: PHYSICIAN GROUP | Age: 64
End: 2019-08-19
Payer: COMMERCIAL

## 2019-08-19 VITALS — HEART RATE: 97 BPM | DIASTOLIC BLOOD PRESSURE: 68 MMHG | SYSTOLIC BLOOD PRESSURE: 97 MMHG

## 2019-08-19 DIAGNOSIS — Z98.890 S/P MITRAL VALVE REPAIR: ICD-10-CM

## 2019-08-19 DIAGNOSIS — I63.9 CEREBROVASCULAR ACCIDENT (CVA), UNSPECIFIED MECHANISM (HCC): ICD-10-CM

## 2019-08-19 LAB — INR PPP: 1.7 (ref 2–3.5)

## 2019-08-19 PROCEDURE — 85610 PROTHROMBIN TIME: CPT | Performed by: INTERNAL MEDICINE

## 2019-08-19 PROCEDURE — 99211 OFF/OP EST MAY X REQ PHY/QHP: CPT | Performed by: INTERNAL MEDICINE

## 2019-08-19 NOTE — PROGRESS NOTES
Anticoagulation Summary  As of 2019    INR goal:   2.0-3.0   TTR:   73.6 % (4 y)   INR used for dosin.70! (2019)   Warfarin maintenance plan:   1.5 mg (1 mg x 1.5) every Mon, Wed, Fri; 1 mg (1 mg x 1) all other days   Weekly warfarin total:   8.5 mg   Plan last modified:   Ramiro ForteD (2019)   Next INR check:   2019   Target end date:   Indefinite    Indications    CVA (cerebral vascular accident) (HCC) [I63.9]  S/P mitral valve repair [Z98.890]  Transient cerebral ischemia (Resolved) [G45.9]             Anticoagulation Episode Summary     INR check location:   Anticoagulation Clinic    Preferred lab:       Send INR reminders to:       Comments:   [place]      Anticoagulation Care Providers     Provider Role Specialty Phone number    Dudley Warner M.D. Referring Cardiology [phone]    Ramiro YepezD Responsible          Anticoagulation Patient Findings  Patient Findings     Negatives:   Signs/symptoms of thrombosis, Signs/symptoms of bleeding, Laboratory test error suspected, Change in health, Change in alcohol use, Change in activity, Upcoming invasive procedure, Emergency department visit, Upcoming dental procedure, Missed doses, Extra doses, Change in medications, Change in diet/appetite, Hospital admission, Bruising, Other complaints        HPI:   Roque Nguyen seen in clinic today, on anticoagulation therapy with warfarin for stroke prevention due to history of CVA, TIA, and mitral valve repair.    Patient's previous INR was therapeutic at 2.2 on 19, at which time patient was instructed to increase weekly warfarin regimen.  He returns to clinic today to recheck INR to ensure it is therapeutic and thus preventing possible clotting and/or bleeding/bruising complications.    CHADS-VASc = n/a  (unadjusted ischemic stroke risk/year:  n/a)    Does patient have any changes to current medical/health status since last appt (Y/N):  NO  Does patient have any  "signs/symptoms of bleeding and/or thrombosis since the last appt (Y/N):  NO  Does patient have any interval changes to diet or medications since last appt (Y/N):  NO  Are there any complications or cost restrictions with current therapy (Y/N):  NO     Does patient have [facility] PCP? YES, Dr Tai Fitch (If not, please document discussion that patient must be seen at [facility])       Vitals:  BP 97/68  HR 97    Weight  declines   Height   5' 7\"     Asssessment:      INR subtherapeutic at 1.7, therefore increasing patient's risk of stroke.   Reason(s) for out of range INR today:  Dose likely too low.      Plan:  Instructed patient to increase weekly warfarin regimen by ~13% as detailed above in order to bring INR to therapeutic range.     Follow up:  Because warfarin is a high risk medication and current CHEST guidelines recommend regular monitoring intervals (few days up to 12 weeks), will have patient return to clinic in 1 weeks to recheck INR.    Markell Stanford, PharmD, BCACP    "

## 2019-08-22 NOTE — DOCUMENTATION QUERY
On license of [facility]                                                                       Query Response Note      PATIENT:               EJ NIETO  ACCT #:                  [id number]  MRN:                     [id number]  :                      1955  ADMIT DATE:       2019 3:24 AM  DISCH DATE:        8/10/2019 5:39 PM  RESPONDING  PROVIDER #:        [id number]           QUERY TEXT:    Acute on Chronic Congestive Heart Failure is documented in the Medical Record. Please document the type (includes probable or suspected)    NOTE:  If an appropriate response is not listed below, please respond with a new note.    The patient's Clinical Indicators include:   ECHO: EF 15%; Previous LVEF appeared to be 40% has decreased to 15%. Wall motion abnormalities are new. Estimation of diastolic function cannot be made due to mitral valve disease.   MD Note: Hx seizure disorder, antiphospholipid syndrome, prior MVR on coumadin, gastrointestinal stromal tumor, started on 2 doses of Sutent 15mg. Admitted with CHF symptoms almost immediately after taking second dose of Sutent with elevated BNP 2400. responded to IV lasix. Also being treated for pneumonia.  Treatment: Lasix; coreg; oxygen; ECHO; lab/imaging  Risk Factors: Age; chemotherapy; HTN; mitral regurgitation s/p MVR; cardioembolic stroke; GI stromal tumor  Options provided:   -- Acute on Chronic Systolic heart failure   -- Acute on Chronic Diastolic heart failure   -- Acute on Chronic Systolic and Diastolic heart failure   -- Unable to determine      Query created by: Alma Rosa Crow on 2019 2:51 PM    RESPONSE TEXT:    Acute on Chronic Systolic heart failure       QUERY TEXT:    Moderate malnutrition is documented in the Dietary Note dated .  Please specify if you agree or disagree with this finding.    NOTE:  If an appropriate response is not listed below, please respond with a new  note.    The patient's Clinical Indicators include:  8/8 Dietary Note: Moderate malnutrition in the setting of acute (on chronic)illness. 10% weight loss; reduced muscle mass per patient. Patient reported he lost 20# in the past three weeks.  Treatment: Dietary consult; encourage meals; monitor weight; nutrition rep  Risk Factors: GI stromal tumor; chemotherapy; hypoxia; sepsis; pneumonia; CHF  Options provided:   -- Agree with dietician finding of moderate malnutrition   -- Disagree with dietician finding of moderate malnutrition   -- Finding of no clinical significance   -- Unable to determine      Query created by: Alma Rosa Crow on 8/21/2019 2:51 PM    RESPONSE TEXT:    Agree with dietician finding of moderate malnutrition          Electronically signed by:  ABILIO HARRELL 8/21/2019 7:30 PM

## 2019-08-26 ENCOUNTER — ANTICOAGULATION VISIT (OUTPATIENT)
Dept: MEDICAL GROUP | Facility: PHYSICIAN GROUP | Age: 64
End: 2019-08-26
Payer: COMMERCIAL

## 2019-08-26 VITALS — SYSTOLIC BLOOD PRESSURE: 82 MMHG | DIASTOLIC BLOOD PRESSURE: 55 MMHG | HEART RATE: 83 BPM

## 2019-08-26 DIAGNOSIS — Z98.890 S/P MITRAL VALVE REPAIR: ICD-10-CM

## 2019-08-26 DIAGNOSIS — I63.9 CEREBROVASCULAR ACCIDENT (CVA), UNSPECIFIED MECHANISM (HCC): ICD-10-CM

## 2019-08-26 LAB — INR PPP: 1.9 (ref 2–3.5)

## 2019-08-26 PROCEDURE — 99211 OFF/OP EST MAY X REQ PHY/QHP: CPT | Performed by: INTERNAL MEDICINE

## 2019-08-26 PROCEDURE — 85610 PROTHROMBIN TIME: CPT | Performed by: INTERNAL MEDICINE

## 2019-08-26 NOTE — PROGRESS NOTES
Anticoagulation Summary  As of 2019    INR goal:   2.0-3.0   TTR:   73.2 % (4 y)   INR used for dosin.90! (2019)   Warfarin maintenance plan:   1 mg (1 mg x 1) every Sun, Tue, u; 1.5 mg (1 mg x 1.5) all other days   Weekly warfarin total:   9 mg   Plan last modified:   Ramiro ForteD (2019)   Next INR check:   2019   Target end date:   Indefinite    Indications    CVA (cerebral vascular accident) (HCC) [I63.9]  S/P mitral valve repair [Z98.890]  Transient cerebral ischemia (Resolved) [G45.9]             Anticoagulation Episode Summary     INR check location:   Anticoagulation Clinic    Preferred lab:       Send INR reminders to:       Comments:   [place]      Anticoagulation Care Providers     Provider Role Specialty Phone number    Dudley Warner M.D. Referring Cardiology [phone]    Ramiro YepezD Responsible          Anticoagulation Patient Findings  Patient Findings     Negatives:   Signs/symptoms of thrombosis, Signs/symptoms of bleeding, Laboratory test error suspected, Change in health, Change in alcohol use, Change in activity, Upcoming invasive procedure, Emergency department visit, Upcoming dental procedure, Missed doses, Extra doses, Change in medications, Change in diet/appetite, Hospital admission, Bruising, Other complaints        HPI:   Roque Nguyen seen in clinic today, on anticoagulation therapy with warfarin for stroke prevention due to history of CVA, TIA, and mitral valve repair.    Patient's previous INR was subtherapeutic at 1.7 on 19, at which time patient was instructed to increase weekly warfarin regimen.  He returns to clinic today to recheck INR to ensure it is therapeutic and thus preventing possible clotting and/or bleeding/bruising complications.    CHADS-VASc = n/a  (unadjusted ischemic stroke risk/year:  n/a)    Does patient have any changes to current medical/health status since last appt (Y/N):  No  Does patient have any  "signs/symptoms of bleeding and/or thrombosis since the last appt (Y/N):  NO  Does patient have any interval changes to diet or medications since last appt (Y/N):  NO  Are there any complications or cost restrictions with current therapy (Y/N):  NO     Does patient have [facility] PCP? YES, Dr Tai Fitch (If not, please document discussion that patient must be seen at [facility])       Vitals:  BP 82/55  HR 83 this is typically baseline for patient, denies any s/s of hypotension    Weight  declines   Height   5' 7\"     Asssessment:      INR remains subtherapeutic at 1.9, therefore increasing patient's risk of stroke.   Reason(s) for out of range INR today:  Dose too low.      Plan:  Instructed patient to bolus with 2mg X 1, then increase weekly warfarin regimen by ~6% as detailed above in order to bring INR to therapeutic range.     Follow up:  Because warfarin is a high risk medication and current CHEST guidelines recommend regular monitoring intervals (few days up to 12 weeks), will have patient return to clinic in 2 weeks to recheck INR (per patient as he will be at [place] for possibly prolonged period).    Markell Stanford, PharmD, BCACP    "

## 2019-08-30 ENCOUNTER — TELEPHONE (OUTPATIENT)
Dept: CARDIOLOGY | Facility: MEDICAL CENTER | Age: 64
End: 2019-08-30

## 2019-08-30 NOTE — TELEPHONE ENCOUNTER
LM to remind patient to complete non-fasting labs before upcoming appt with TEZ Henriquez on 09/05.

## 2019-09-03 ENCOUNTER — HOSPITAL ENCOUNTER (OUTPATIENT)
Dept: LAB | Facility: MEDICAL CENTER | Age: 64
End: 2019-09-03
Attending: INTERNAL MEDICINE
Payer: COMMERCIAL

## 2019-09-03 PROCEDURE — 80053 COMPREHEN METABOLIC PANEL: CPT

## 2019-09-04 LAB
ALBUMIN SERPL BCP-MCNC: 2.3 G/DL (ref 3.2–4.9)
ALBUMIN/GLOB SERPL: 0.8 G/DL
ALP SERPL-CCNC: 166 U/L (ref 30–99)
ALT SERPL-CCNC: 14 U/L (ref 2–50)
ANION GAP SERPL CALC-SCNC: 10 MMOL/L (ref 0–11.9)
AST SERPL-CCNC: 30 U/L (ref 12–45)
BILIRUB SERPL-MCNC: 1.2 MG/DL (ref 0.1–1.5)
BUN SERPL-MCNC: 23 MG/DL (ref 8–22)
CALCIUM SERPL-MCNC: 8.1 MG/DL (ref 8.5–10.5)
CHLORIDE SERPL-SCNC: 107 MMOL/L (ref 96–112)
CO2 SERPL-SCNC: 22 MMOL/L (ref 20–33)
CREAT SERPL-MCNC: 1.03 MG/DL (ref 0.5–1.4)
GLOBULIN SER CALC-MCNC: 2.8 G/DL (ref 1.9–3.5)
GLUCOSE SERPL-MCNC: 89 MG/DL (ref 65–99)
POTASSIUM SERPL-SCNC: 5.8 MMOL/L (ref 3.6–5.5)
PROT SERPL-MCNC: 5.1 G/DL (ref 6–8.2)
SODIUM SERPL-SCNC: 139 MMOL/L (ref 135–145)

## 2019-09-05 ENCOUNTER — HOSPITAL ENCOUNTER (INPATIENT)
Facility: MEDICAL CENTER | Age: 64
LOS: 3 days | DRG: 378 | End: 2019-09-08
Attending: EMERGENCY MEDICINE | Admitting: HOSPITALIST
Payer: MEDICARE

## 2019-09-05 ENCOUNTER — TELEPHONE (OUTPATIENT)
Dept: CARDIOLOGY | Facility: MEDICAL CENTER | Age: 64
End: 2019-09-05

## 2019-09-05 ENCOUNTER — APPOINTMENT (OUTPATIENT)
Dept: RADIOLOGY | Facility: MEDICAL CENTER | Age: 64
DRG: 378 | End: 2019-09-05
Attending: STUDENT IN AN ORGANIZED HEALTH CARE EDUCATION/TRAINING PROGRAM
Payer: MEDICARE

## 2019-09-05 DIAGNOSIS — I50.22 CHRONIC SYSTOLIC CONGESTIVE HEART FAILURE, NYHA CLASS 2 (HCC): ICD-10-CM

## 2019-09-05 DIAGNOSIS — Z98.890 S/P MITRAL VALVE REPAIR: Chronic | ICD-10-CM

## 2019-09-05 DIAGNOSIS — D64.9 ANEMIA, UNSPECIFIED TYPE: ICD-10-CM

## 2019-09-05 DIAGNOSIS — C80.1 CANCER (HCC): ICD-10-CM

## 2019-09-05 DIAGNOSIS — D68.61 ANTIPHOSPHOLIPID SYNDROME (HCC): ICD-10-CM

## 2019-09-05 PROBLEM — E86.1 HYPOTENSION DUE TO HYPOVOLEMIA: Status: ACTIVE | Noted: 2019-09-05

## 2019-09-05 PROBLEM — R19.7 DIARRHEA: Status: ACTIVE | Noted: 2019-09-05

## 2019-09-05 PROBLEM — D62 ANEMIA ASSOCIATED WITH ACUTE BLOOD LOSS: Status: ACTIVE | Noted: 2019-09-05

## 2019-09-05 PROBLEM — R79.89 ELEVATED TROPONIN: Status: ACTIVE | Noted: 2019-09-05

## 2019-09-05 PROBLEM — K92.2 GI BLEED: Status: ACTIVE | Noted: 2019-09-05

## 2019-09-05 PROBLEM — I95.89 HYPOTENSION DUE TO HYPOVOLEMIA: Status: ACTIVE | Noted: 2019-09-05

## 2019-09-05 LAB
ABO GROUP BLD: NORMAL
ALBUMIN SERPL BCP-MCNC: 2.3 G/DL (ref 3.2–4.9)
ALBUMIN/GLOB SERPL: 0.7 G/DL
ALP SERPL-CCNC: 135 U/L (ref 30–99)
ALT SERPL-CCNC: 14 U/L (ref 2–50)
AMORPH CRY #/AREA URNS HPF: PRESENT /HPF
ANION GAP SERPL CALC-SCNC: 10 MMOL/L (ref 0–11.9)
APPEARANCE UR: ABNORMAL
APTT PPP: 58.1 SEC (ref 24.7–36)
AST SERPL-CCNC: 23 U/L (ref 12–45)
BACTERIA #/AREA URNS HPF: NEGATIVE /HPF
BARCODED ABORH UBTYP: 6200
BARCODED PRD CODE UBPRD: NORMAL
BARCODED UNIT NUM UBUNT: NORMAL
BASOPHILS # BLD AUTO: 0.1 % (ref 0–1.8)
BASOPHILS # BLD: 0.01 K/UL (ref 0–0.12)
BILIRUB SERPL-MCNC: 1.1 MG/DL (ref 0.1–1.5)
BLD GP AB SCN SERPL QL: NORMAL
BUN SERPL-MCNC: 23 MG/DL (ref 8–22)
CALCIUM SERPL-MCNC: 8.1 MG/DL (ref 8.5–10.5)
CHLORIDE SERPL-SCNC: 106 MMOL/L (ref 96–112)
CO2 SERPL-SCNC: 21 MMOL/L (ref 20–33)
COLOR UR: ABNORMAL
COMPONENT R 8504R: NORMAL
CREAT SERPL-MCNC: 0.99 MG/DL (ref 0.5–1.4)
EKG IMPRESSION: NORMAL
EOSINOPHIL # BLD AUTO: 0.03 K/UL (ref 0–0.51)
EOSINOPHIL NFR BLD: 0.4 % (ref 0–6.9)
EPI CELLS #/AREA URNS HPF: ABNORMAL /HPF
ERYTHROCYTE [DISTWIDTH] IN BLOOD BY AUTOMATED COUNT: 47.1 FL (ref 35.9–50)
GLOBULIN SER CALC-MCNC: 3.2 G/DL (ref 1.9–3.5)
GLUCOSE SERPL-MCNC: 92 MG/DL (ref 65–99)
GLUCOSE UR STRIP.AUTO-MCNC: NEGATIVE MG/DL
HCT VFR BLD AUTO: 23.3 % (ref 42–52)
HGB BLD-MCNC: 7.2 G/DL (ref 14–18)
HGB BLD-MCNC: 7.5 G/DL (ref 14–18)
IMM GRANULOCYTES # BLD AUTO: 0.03 K/UL (ref 0–0.11)
IMM GRANULOCYTES NFR BLD AUTO: 0.4 % (ref 0–0.9)
INR PPP: 1.81 (ref 0.87–1.13)
KETONES UR STRIP.AUTO-MCNC: 15 MG/DL
LEUKOCYTE ESTERASE UR QL STRIP.AUTO: NEGATIVE
LYMPHOCYTES # BLD AUTO: 0.92 K/UL (ref 1–4.8)
LYMPHOCYTES NFR BLD: 12.3 % (ref 22–41)
MCH RBC QN AUTO: 27.8 PG (ref 27–33)
MCHC RBC AUTO-ENTMCNC: 30.9 G/DL (ref 33.7–35.3)
MCV RBC AUTO: 90 FL (ref 81.4–97.8)
MICRO URNS: ABNORMAL
MONOCYTES # BLD AUTO: 0.68 K/UL (ref 0–0.85)
MONOCYTES NFR BLD AUTO: 9.1 % (ref 0–13.4)
NEUTROPHILS # BLD AUTO: 5.83 K/UL (ref 1.82–7.42)
NEUTROPHILS NFR BLD: 77.7 % (ref 44–72)
NRBC # BLD AUTO: 0 K/UL
NRBC BLD-RTO: 0 /100 WBC
PH UR STRIP.AUTO: 5 [PH] (ref 5–8)
PLATELET # BLD AUTO: 342 K/UL (ref 164–446)
PMV BLD AUTO: 9.9 FL (ref 9–12.9)
POTASSIUM SERPL-SCNC: 4.1 MMOL/L (ref 3.6–5.5)
PRODUCT TYPE UPROD: NORMAL
PROT SERPL-MCNC: 5.5 G/DL (ref 6–8.2)
PROT UR QL STRIP: NEGATIVE MG/DL
PROTHROMBIN TIME: 21.5 SEC (ref 12–14.6)
RBC # BLD AUTO: 2.59 M/UL (ref 4.7–6.1)
RBC # URNS HPF: ABNORMAL /HPF
RBC UR QL AUTO: NEGATIVE
RH BLD: NORMAL
SODIUM SERPL-SCNC: 137 MMOL/L (ref 135–145)
SP GR UR STRIP.AUTO: >=1.03
TROPONIN T SERPL-MCNC: 70 NG/L (ref 6–19)
UNIT STATUS USTAT: NORMAL
UROBILINOGEN UR STRIP.AUTO-MCNC: 0.2 MG/DL
WBC # BLD AUTO: 7.5 K/UL (ref 4.8–10.8)
WBC #/AREA URNS HPF: ABNORMAL /HPF
WBC STL QL MICRO: NORMAL

## 2019-09-05 PROCEDURE — 85018 HEMOGLOBIN: CPT

## 2019-09-05 PROCEDURE — 87493 C DIFF AMPLIFIED PROBE: CPT

## 2019-09-05 PROCEDURE — C9113 INJ PANTOPRAZOLE SODIUM, VIA: HCPCS | Performed by: HOSPITALIST

## 2019-09-05 PROCEDURE — 80053 COMPREHEN METABOLIC PANEL: CPT

## 2019-09-05 PROCEDURE — 700105 HCHG RX REV CODE 258: Performed by: EMERGENCY MEDICINE

## 2019-09-05 PROCEDURE — 74174 CTA ABD&PLVS W/CONTRAST: CPT

## 2019-09-05 PROCEDURE — 770020 HCHG ROOM/CARE - TELE (206)

## 2019-09-05 PROCEDURE — P9016 RBC LEUKOCYTES REDUCED: HCPCS

## 2019-09-05 PROCEDURE — 87209 SMEAR COMPLEX STAIN: CPT

## 2019-09-05 PROCEDURE — 85730 THROMBOPLASTIN TIME PARTIAL: CPT

## 2019-09-05 PROCEDURE — 84484 ASSAY OF TROPONIN QUANT: CPT

## 2019-09-05 PROCEDURE — 89055 LEUKOCYTE ASSESSMENT FECAL: CPT

## 2019-09-05 PROCEDURE — 86900 BLOOD TYPING SEROLOGIC ABO: CPT

## 2019-09-05 PROCEDURE — 700105 HCHG RX REV CODE 258: Performed by: HOSPITALIST

## 2019-09-05 PROCEDURE — 700111 HCHG RX REV CODE 636 W/ 250 OVERRIDE (IP): Performed by: HOSPITALIST

## 2019-09-05 PROCEDURE — 99223 1ST HOSP IP/OBS HIGH 75: CPT | Performed by: HOSPITALIST

## 2019-09-05 PROCEDURE — 87177 OVA AND PARASITES SMEARS: CPT

## 2019-09-05 PROCEDURE — A9270 NON-COVERED ITEM OR SERVICE: HCPCS | Performed by: HOSPITALIST

## 2019-09-05 PROCEDURE — 85610 PROTHROMBIN TIME: CPT

## 2019-09-05 PROCEDURE — 700117 HCHG RX CONTRAST REV CODE 255: Performed by: STUDENT IN AN ORGANIZED HEALTH CARE EDUCATION/TRAINING PROGRAM

## 2019-09-05 PROCEDURE — 700102 HCHG RX REV CODE 250 W/ 637 OVERRIDE(OP): Performed by: HOSPITALIST

## 2019-09-05 PROCEDURE — 36430 TRANSFUSION BLD/BLD COMPNT: CPT

## 2019-09-05 PROCEDURE — 700102 HCHG RX REV CODE 250 W/ 637 OVERRIDE(OP): Performed by: EMERGENCY MEDICINE

## 2019-09-05 PROCEDURE — 86901 BLOOD TYPING SEROLOGIC RH(D): CPT

## 2019-09-05 PROCEDURE — 99285 EMERGENCY DEPT VISIT HI MDM: CPT

## 2019-09-05 PROCEDURE — 93005 ELECTROCARDIOGRAM TRACING: CPT | Performed by: EMERGENCY MEDICINE

## 2019-09-05 PROCEDURE — A9270 NON-COVERED ITEM OR SERVICE: HCPCS | Performed by: EMERGENCY MEDICINE

## 2019-09-05 PROCEDURE — 85025 COMPLETE CBC W/AUTO DIFF WBC: CPT

## 2019-09-05 PROCEDURE — 36415 COLL VENOUS BLD VENIPUNCTURE: CPT

## 2019-09-05 PROCEDURE — 81001 URINALYSIS AUTO W/SCOPE: CPT

## 2019-09-05 PROCEDURE — 86923 COMPATIBILITY TEST ELECTRIC: CPT

## 2019-09-05 PROCEDURE — 86850 RBC ANTIBODY SCREEN: CPT

## 2019-09-05 PROCEDURE — 30233N1 TRANSFUSION OF NONAUTOLOGOUS RED BLOOD CELLS INTO PERIPHERAL VEIN, PERCUTANEOUS APPROACH: ICD-10-PCS | Performed by: EMERGENCY MEDICINE

## 2019-09-05 RX ORDER — OXYCODONE HYDROCHLORIDE 5 MG/1
5 TABLET ORAL
Status: DISCONTINUED | OUTPATIENT
Start: 2019-09-05 | End: 2019-09-08

## 2019-09-05 RX ORDER — PANTOPRAZOLE SODIUM 40 MG/10ML
40 INJECTION, POWDER, LYOPHILIZED, FOR SOLUTION INTRAVENOUS 2 TIMES DAILY
Status: DISCONTINUED | OUTPATIENT
Start: 2019-09-05 | End: 2019-09-06

## 2019-09-05 RX ORDER — WARFARIN SODIUM 1 MG/1
1-1.5 TABLET ORAL DAILY
Status: ON HOLD | COMMUNITY
End: 2019-09-08

## 2019-09-05 RX ORDER — BISACODYL 10 MG
10 SUPPOSITORY, RECTAL RECTAL
Status: DISCONTINUED | OUTPATIENT
Start: 2019-09-05 | End: 2019-09-05

## 2019-09-05 RX ORDER — LACOSAMIDE 50 MG/1
200 TABLET ORAL 2 TIMES DAILY
Status: DISCONTINUED | OUTPATIENT
Start: 2019-09-05 | End: 2019-09-05

## 2019-09-05 RX ORDER — SODIUM CHLORIDE 9 MG/ML
INJECTION, SOLUTION INTRAVENOUS CONTINUOUS
Status: DISCONTINUED | OUTPATIENT
Start: 2019-09-05 | End: 2019-09-07

## 2019-09-05 RX ORDER — AMOXICILLIN 250 MG
1-2 CAPSULE ORAL
COMMUNITY

## 2019-09-05 RX ORDER — LACOSAMIDE 100 MG/1
200 TABLET ORAL 2 TIMES DAILY
Status: DISCONTINUED | OUTPATIENT
Start: 2019-09-05 | End: 2019-09-08

## 2019-09-05 RX ORDER — OXYCODONE HYDROCHLORIDE 5 MG/1
2.5 TABLET ORAL
Status: DISCONTINUED | OUTPATIENT
Start: 2019-09-05 | End: 2019-09-08

## 2019-09-05 RX ORDER — ONDANSETRON 4 MG/1
4 TABLET, ORALLY DISINTEGRATING ORAL EVERY 4 HOURS PRN
Status: DISCONTINUED | OUTPATIENT
Start: 2019-09-05 | End: 2019-09-08

## 2019-09-05 RX ORDER — ONDANSETRON 2 MG/ML
4 INJECTION INTRAMUSCULAR; INTRAVENOUS EVERY 4 HOURS PRN
Status: DISCONTINUED | OUTPATIENT
Start: 2019-09-05 | End: 2019-09-08

## 2019-09-05 RX ORDER — LACOSAMIDE 200 MG/1
200 TABLET ORAL 2 TIMES DAILY
COMMUNITY
Start: 2019-09-01 | End: 2020-02-29

## 2019-09-05 RX ORDER — ACETAMINOPHEN 325 MG/1
650 TABLET ORAL EVERY 6 HOURS PRN
Status: DISCONTINUED | OUTPATIENT
Start: 2019-09-05 | End: 2019-09-08

## 2019-09-05 RX ORDER — HYDROMORPHONE HYDROCHLORIDE 1 MG/ML
0.25 INJECTION, SOLUTION INTRAMUSCULAR; INTRAVENOUS; SUBCUTANEOUS
Status: DISCONTINUED | OUTPATIENT
Start: 2019-09-05 | End: 2019-09-08

## 2019-09-05 RX ORDER — LEVETIRACETAM 500 MG/1
2000 TABLET ORAL 2 TIMES DAILY
Status: DISCONTINUED | OUTPATIENT
Start: 2019-09-05 | End: 2019-09-08

## 2019-09-05 RX ORDER — SODIUM CHLORIDE, SODIUM LACTATE, POTASSIUM CHLORIDE, CALCIUM CHLORIDE 600; 310; 30; 20 MG/100ML; MG/100ML; MG/100ML; MG/100ML
1000 INJECTION, SOLUTION INTRAVENOUS ONCE
Status: COMPLETED | OUTPATIENT
Start: 2019-09-05 | End: 2019-09-05

## 2019-09-05 RX ORDER — POLYETHYLENE GLYCOL 3350 17 G/17G
1 POWDER, FOR SOLUTION ORAL
Status: DISCONTINUED | OUTPATIENT
Start: 2019-09-05 | End: 2019-09-05

## 2019-09-05 RX ORDER — AMOXICILLIN 250 MG
2 CAPSULE ORAL 2 TIMES DAILY
Status: DISCONTINUED | OUTPATIENT
Start: 2019-09-05 | End: 2019-09-05

## 2019-09-05 RX ADMIN — LEVETIRACETAM 2000 MG: 500 TABLET, FILM COATED ORAL at 17:41

## 2019-09-05 RX ADMIN — SODIUM CHLORIDE, POTASSIUM CHLORIDE, SODIUM LACTATE AND CALCIUM CHLORIDE 1000 ML: 600; 310; 30; 20 INJECTION, SOLUTION INTRAVENOUS at 09:26

## 2019-09-05 RX ADMIN — LACOSAMIDE 200 MG: 100 TABLET, FILM COATED ORAL at 17:41

## 2019-09-05 RX ADMIN — IOHEXOL 100 ML: 350 INJECTION, SOLUTION INTRAVENOUS at 19:18

## 2019-09-05 RX ADMIN — PANTOPRAZOLE SODIUM 40 MG: 40 INJECTION, POWDER, LYOPHILIZED, FOR SOLUTION INTRAVENOUS at 20:59

## 2019-09-05 RX ADMIN — SODIUM CHLORIDE: 9 INJECTION, SOLUTION INTRAVENOUS at 17:06

## 2019-09-05 ASSESSMENT — LIFESTYLE VARIABLES
HOW MANY TIMES IN THE PAST YEAR HAVE YOU HAD 5 OR MORE DRINKS IN A DAY: 0
ALCOHOL_USE: NO
EVER HAD A DRINK FIRST THING IN THE MORNING TO STEADY YOUR NERVES TO GET RID OF A HANGOVER: NO
TOTAL SCORE: 0
AVERAGE NUMBER OF DAYS PER WEEK YOU HAVE A DRINK CONTAINING ALCOHOL: 0
HAVE YOU EVER FELT YOU SHOULD CUT DOWN ON YOUR DRINKING: NO
CONSUMPTION TOTAL: INCOMPLETE
TOTAL SCORE: 0
HAVE PEOPLE ANNOYED YOU BY CRITICIZING YOUR DRINKING: NO
CONSUMPTION TOTAL: NEGATIVE
HAVE YOU EVER FELT YOU SHOULD CUT DOWN ON YOUR DRINKING: NO
ON A TYPICAL DAY WHEN YOU DRINK ALCOHOL HOW MANY DRINKS DO YOU HAVE: 0
ALCOHOL_USE: NO
EVER FELT BAD OR GUILTY ABOUT YOUR DRINKING: NO
TOTAL SCORE: 0
EVER_SMOKED: YES
EVER FELT BAD OR GUILTY ABOUT YOUR DRINKING: NO
TOTAL SCORE: 0
HAVE PEOPLE ANNOYED YOU BY CRITICIZING YOUR DRINKING: NO
DOES PATIENT WANT TO STOP DRINKING: NO
EVER HAD A DRINK FIRST THING IN THE MORNING TO STEADY YOUR NERVES TO GET RID OF A HANGOVER: NO

## 2019-09-05 ASSESSMENT — COGNITIVE AND FUNCTIONAL STATUS - GENERAL
DRESSING REGULAR LOWER BODY CLOTHING: A LITTLE
SUGGESTED CMS G CODE MODIFIER MOBILITY: CJ
CLIMB 3 TO 5 STEPS WITH RAILING: A LITTLE
STANDING UP FROM CHAIR USING ARMS: A LITTLE
WALKING IN HOSPITAL ROOM: A LITTLE
TOILETING: A LITTLE
MOBILITY SCORE: 21
HELP NEEDED FOR BATHING: A LITTLE
SUGGESTED CMS G CODE MODIFIER DAILY ACTIVITY: CJ
DAILY ACTIVITIY SCORE: 21

## 2019-09-05 ASSESSMENT — ENCOUNTER SYMPTOMS
DIARRHEA: 1
WEIGHT LOSS: 1
FEVER: 0
NAUSEA: 1
VOMITING: 1
CHILLS: 0
BLOOD IN STOOL: 1
HEARTBURN: 0
ABDOMINAL PAIN: 1

## 2019-09-05 ASSESSMENT — PATIENT HEALTH QUESTIONNAIRE - PHQ9
2. FEELING DOWN, DEPRESSED, IRRITABLE, OR HOPELESS: NOT AT ALL
SUM OF ALL RESPONSES TO PHQ9 QUESTIONS 1 AND 2: 0
1. LITTLE INTEREST OR PLEASURE IN DOING THINGS: NOT AT ALL

## 2019-09-05 NOTE — ASSESSMENT & PLAN NOTE
Given hypotension on presentation we will hold carvedilol lisinopril and diuretics  Monitor fluid status

## 2019-09-05 NOTE — ASSESSMENT & PLAN NOTE
With an enlarging tumor on his last CT  Follows up with Dr. Dupont and has also been evaluated at [place] and reports that he was scheduled to start and you chemotherapy infusion the Saturday with gemcitabine and docetaxel

## 2019-09-05 NOTE — ED PROVIDER NOTES
ED Provider Note      CHIEF COMPLAINT  Chief Complaint   Patient presents with   • Bloody Stools     pt has sarcoma; pt reports that he has blood bright red in stool this morning; saturday morning scheduled infusion   • Diarrhea     last 2 wks; dark stool       HPI  Roque Nguyen is a 64 y.o. male who presents to the emergency department with bright red bloody stool.  Patient suffers from ongoing diarrhea.  He reports he had bright red bloody bowel movement today.  He was covered with bloody stool in his bed and his stool was uncontrollable.  He has been feeling progressively weak and tired.  He has known anemia and there is some sort of infusion scheduled for Saturday.  He denies having chest pain but he has felt tired and lightheaded when trying to stand.  He has not had palpitations.  He has not had fevers or chills.  No dysuria or hematuria.  No other easy bruising or bleeding.    Patient has a past history of mechanical mitral valve.  History of antiphospholipid syndrome.  He is anticoagulated with warfarin.    Has history of stromal GI tumor.    He is not a good historian.  The bulk of the history is obtained from the sister.  The patient's wife is at home on hospice.    REVIEW OF SYSTEMS  As per HPI, otherwise a 10 point review of systems is negative    PAST MEDICAL HISTORY  Past Medical History:   Diagnosis Date   • Antiphospholipid syndrome (HCC) 3/15/2010   • ASTHMA    • Cancer (HCC) 2014    Prostate cancer   • Chronic anticoagulation    • Congestive heart failure (HCC)    • CVA (cerebral vascular accident) (HCC) 7/28/2015   • Dental disorder     Broken   • Epilepsy (HCC) 5/14/2018   • Hemorrhagic disorder ([facility])    • Hypertension     On Lisinopril   • LBBB (left bundle branch block)    • LV dysfunction April 2015    Echocardiogram with normal LV size, LVEF 40%. Severely dilated LA. Severe MR, mild TR. RVSP 45mmHg   • Mitral regurgitation April 2015    Echocardiogram with severe MR   • S/P mitral  valve repair    • Seizure disorder (HCC)     Initial diagnosis, last seizure in ; followed by neurology, on Keppra.   • Snoring    • TIA (transient ischemic attack) /    Antiphospholipid antibody   • Tremors of nervous system     mostly left sided       SOCIAL HISTORY  Social History     Tobacco Use   • Smoking status: Former Smoker     Packs/day: 1.00     Years: 37.00     Pack years: 37.00     Types: Cigarettes     Start date: 1985     Last attempt to quit: 2012     Years since quittin.1   • Smokeless tobacco: Never Used   Substance Use Topics   • Alcohol use: Yes     Alcohol/week: 0.0 oz     Comment: occ   • Drug use: No       SURGICAL HISTORY  Past Surgical History:   Procedure Laterality Date   • MITRAL VALVE REPAIR  2015    Procedure: MITRAL VALVE REPAIR  with MICHAEL;  Surgeon: Gina Sim M.D.;  Location: SURGERY [facility];  Service:    • RECOVERY  5/15/2015    Procedure: MICHAEL-[id number]  MITRAL REGURGITATION;  Surgeon: [facility];  Location: SURGERY SAME DAY [facility];  Service:    • INGUINAL HERNIA REPAIR  2010    Performed by EJ STILL at SURGERY SAME DAY [facility] ORS   • CHOLECYSTECTOMY         CURRENT MEDICATIONS  Home Medications     Reviewed by Dilcia Silva (Pharmacy Tech) on 19 at 1115  Med List Status: Complete   Medication Last Dose Status   carvedilol (COREG) 12.5 MG Tab 2019 Active   lacosamide (VIMPAT) 200 MG Tab tablet 2019 Active   levetiracetam (KEPPRA) 1000 MG tablet 2019 Active   lisinopril (PRINIVIL) 2.5 MG Tab 2019 Active   multivitamin (THERAGRAN) Tab 2019 Active   potassium chloride SA (K-DUR) 10 MEQ Tab CR 2019 Active   senna-docusate (PERICOLACE OR SENOKOT S) 8.6-50 MG Tab PRN Active   spironolactone (ALDACTONE) 25 MG Tab 2019 Active   torsemide (DEMADEX) 5 MG Tab 2019 Active   warfarin (COUMADIN) 1 MG Tab 2019 Active                ALLERGIES  No Known  "Allergies    PHYSICAL EXAM  VITAL SIGNS: /63   Pulse 98   Temp 36.7 °C (98 °F)   Resp 20   Ht 1.676 m (5' 6\")   Wt 75.4 kg (166 lb 3.6 oz)   SpO2 96%   BMI 26.83 kg/m²    Constitutional: Awake and alert.  Poor historian.  Pale and weak appearing  HENT:  Atraumatic, Normocephalic.Oropharynx dry mucus membranes, Nose normal inspection.   Eyes: Normal inspection  Neck: Supple  Cardiovascular: Tachycardic heart rate, Normal rhythm.  Symmetric peripheral pulses.   Thorax & Lungs: No respiratory distress, No wheezing, No rales, No rhonchi, No chest tenderness.   Abdomen: Bowel sounds normal, soft, non-distended, nontender, large left-sided abdominal mass  Rectal: Nonthrombosed or bleeding external hemorrhoids.  Mild tenderness on rectal exam with good tone.  Yellow Hemoccult positive stool  Skin: Warm, Dry, No rash.   Back: No tenderness, No CVA tenderness.   Extremities: Pedal edema  Psychiatric: Anxious appearing      Labs:  Results for orders placed or performed during the hospital encounter of 09/05/19   CBC WITH DIFFERENTIAL   Result Value Ref Range    WBC 7.5 4.8 - 10.8 K/uL    RBC 2.59 (L) 4.70 - 6.10 M/uL    Hemoglobin 7.2 (L) 14.0 - 18.0 g/dL    Hematocrit 23.3 (L) 42.0 - 52.0 %    MCV 90.0 81.4 - 97.8 fL    MCH 27.8 27.0 - 33.0 pg    MCHC 30.9 (L) 33.7 - 35.3 g/dL    RDW 47.1 35.9 - 50.0 fL    Platelet Count 342 164 - 446 K/uL    MPV 9.9 9.0 - 12.9 fL    Neutrophils-Polys 77.70 (H) 44.00 - 72.00 %    Lymphocytes 12.30 (L) 22.00 - 41.00 %    Monocytes 9.10 0.00 - 13.40 %    Eosinophils 0.40 0.00 - 6.90 %    Basophils 0.10 0.00 - 1.80 %    Immature Granulocytes 0.40 0.00 - 0.90 %    Nucleated RBC 0.00 /100 WBC    Neutrophils (Absolute) 5.83 1.82 - 7.42 K/uL    Lymphs (Absolute) 0.92 (L) 1.00 - 4.80 K/uL    Monos (Absolute) 0.68 0.00 - 0.85 K/uL    Eos (Absolute) 0.03 0.00 - 0.51 K/uL    Baso (Absolute) 0.01 0.00 - 0.12 K/uL    Immature Granulocytes (abs) 0.03 0.00 - 0.11 K/uL    NRBC (Absolute) 0.00 " K/uL   COMP METABOLIC PANEL   Result Value Ref Range    Sodium 137 135 - 145 mmol/L    Potassium 4.1 3.6 - 5.5 mmol/L    Chloride 106 96 - 112 mmol/L    Co2 21 20 - 33 mmol/L    Anion Gap 10.0 0.0 - 11.9    Glucose 92 65 - 99 mg/dL    Bun 23 (H) 8 - 22 mg/dL    Creatinine 0.99 0.50 - 1.40 mg/dL    Calcium 8.1 (L) 8.5 - 10.5 mg/dL    AST(SGOT) 23 12 - 45 U/L    ALT(SGPT) 14 2 - 50 U/L    Alkaline Phosphatase 135 (H) 30 - 99 U/L    Total Bilirubin 1.1 0.1 - 1.5 mg/dL    Albumin 2.3 (L) 3.2 - 4.9 g/dL    Total Protein 5.5 (L) 6.0 - 8.2 g/dL    Globulin 3.2 1.9 - 3.5 g/dL    A-G Ratio 0.7 g/dL   PROTHROMBIN TIME (INR)   Result Value Ref Range    PT 21.5 (H) 12.0 - 14.6 sec    INR 1.81 (H) 0.87 - 1.13   APTT   Result Value Ref Range    APTT 58.1 (H) 24.7 - 36.0 sec   COD (ADULT)   Result Value Ref Range    ABO Grouping Only A     Rh Grouping Only POS     Antibody Screen-Cod NEG     Component R       R3                  Red Blood Cells3    [id number]   issued       19   11:01      Product Type Red Blood Cells LR Pheresis     Dispense Status issued     Unit Number (Barcoded) [id number]     Product Code (Barcoded) P5845X07     Blood Type (Barcoded) 6200    ESTIMATED GFR   Result Value Ref Range    GFR If African American >60 >60 mL/min/1.73 m 2    GFR If Non African American >60 >60 mL/min/1.73 m 2   TROPONIN   Result Value Ref Range    Troponin T 70 (H) 6 - 19 ng/L   EKG (NOW)   Result Value Ref Range    Report       [facility] Emergency Dept.    Test Date:  2019  Pt Name:    EJ NIETO               Department: ER  MRN:        [id number]                      Room:        08  Gender:     Male                         Technician: [id number]  :        1955                   Requested By:KAYLA LAGUNAS  Order #:    [id number]                    Reading MD: KAYLA LAGUNAS MD    Measurements  Intervals                                Axis  Rate:       99                           P:           38  TN:         172                          QRS:        -9  QRSD:       89                           T:          -29  QT:         353  QTc:        453    Interpretive Statements  Sinus rhythm  Abnormal T, probable ischemia, anterior leads  Compared to ECG 08/07/2019 03:34:47  T-wave abnormality now present  Possible ischemia now present  First degree AV block no longer present  Intraventricular conduction delay no longer present  Myocardial infarct finding no longer present    Elect ronically Signed On 9-5-2019 10:41:44 PDT by KAYLA LAGUNAS MD         Medications   LR infusion (bolus) (0 mL Intravenous Stopped 9/5/19 1045)           COURSE & MEDICAL DECISION MAKING  Patient presents to the ER with generalized weakness.  Reported grossly bloody diarrheal bowel movement.  He was tachycardic and mildly hypotensive on arrival.  He was given IV fluids because of this.  Oral fluids were not appropriate.  His blood pressure improved.  Performed rectal exam.  He has yellow Hemoccult positive stool.  He does not appear to have active bleeding.    Laboratory data returned demonstrating significant anemia.  His hemoglobin is 7.2.  He is pale, weak and he has EKG changes that I worry could be ischemia related to anemia and therefore 1 unit of packed red blood cells was ordered.    I consulted Dr. Ana Morales because of reports of GI bleeding.    I consulted Dr. Balderas for admission.  The patient will need to be followed closely because of his history of congestive heart failure and fluid administration.  We have elected not to reverse anticoagulation because he has high risk and does not have evidence of active bleeding.  He will be followed closely.      FINAL IMPRESSION  1.  Critical anemia  2.  Hematochezia  3.  Chronic anticoagulation with warfarin    CRITICAL CARE TIME 40 minutes  There was a very real possibility of deterioration of the patient's condition.  This patient required the highest level of care.   I provided critical care services which included: review of the medical record, treatment orders, ordering and reviewing test results, frequent reevaluation of the patient's condition and response to treatment, as well as discussing the case with appropriate personnel and various consultants. The critical care time associated with the care of this patient is exclusive of any procedures or specific interventions.    This dictation was created using voice recognition software. The accuracy of the dictation is limited to the abilities of the software.  The nursing notes were reviewed and certain aspects of this information were incorporated into this note.      Electronically signed by: Pierce Escobedo, 9/5/2019 11:20 AM

## 2019-09-05 NOTE — CONSULTS
Date of Consultation:  9/5/2019    Patient: : Roque Nguyen  MRN: [id number]    Referring Physician: Pierce Escobedo M.D.    GI:Amanda Gaitan M.D.     Reason for Consultation: Hematochezia, bloody diarrhea.    History of Present Illness: Patient is poor historian  Roque Nguyen is a 64 y.o. male with medical history of dedifferentiated liposarcoma of mesentery, status post mitral valve repair on warfarin, currently undergoing chemotherapy presented to the emergency department because of 3 weeks history of watery diarrhea and average 10 times per day with several accidents associated with nausea and vomiting, patient described intermittent bloody stools over the last 3 to 4 days but not today.  Stool is more blackish, denies pain following eating.  Patient was evaluated in April 2019 for abdominal mass with ultrasound finding of large 11 cm tumor in the abdomen, diagnosed with GI ST, started on imatinib because his tumor was not resectable due to proximity to SMA.  Follow-up CT scan showed progression of the disease and the tumor wrapped around the SMA, path review at [place] confirm the diagnosis of dedifferentiated liposarcoma.  His chemotherapy was switched to gemcitabine and docetaxel.  His most recent echocardiogram showed ejection fraction of 15%.    Otherwise the patient is doing fine without complaints of fever/ chills/ dysphagia/ odynophagia/ heartburn.      Past Medical History:   Diagnosis Date   • Epilepsy (HCC) 5/14/2018   • CVA (cerebral vascular accident) (HCC) 7/28/2015   • LV dysfunction April 2015    Echocardiogram with normal LV size, LVEF 40%. Severely dilated LA. Severe MR, mild TR. RVSP 45mmHg   • Mitral regurgitation April 2015    Echocardiogram with severe MR   • Cancer (HCC) 2014    Prostate cancer   • Antiphospholipid syndrome (HCC) 3/15/2010   • Seizure disorder (HCC) 2007    Initial diagnosis, last seizure in 2012; followed by neurology, on Keppra.   • ASTHMA    •  Chronic anticoagulation    • Congestive heart failure (HCC)    • Dental disorder     Broken   • Hemorrhagic disorder (HCC)    • Hypertension     On Lisinopril   • LBBB (left bundle branch block)    • S/P mitral valve repair    • Snoring    • TIA (transient ischemic attack) 2007/2008    Antiphospholipid antibody   • Tremors of nervous system     mostly left sided         Past Surgical History:   Procedure Laterality Date   • MITRAL VALVE REPAIR  7/20/2015    Procedure: MITRAL VALVE REPAIR  with MICHAEL;  Surgeon: Gina Sim M.D.;  Location: SURGERY [facility];  Service:    • RECOVERY  5/15/2015    Procedure: [id number]  MITRAL REGURGITATION;  Surgeon: Ir[facility];  Location: SURGERY SAME DAY [facility];  Service:    • INGUINAL HERNIA REPAIR  4/20/2010    Performed by EJ STILL at SURGERY SAME DAY [facility]   • CHOLECYSTECTOMY         Family History   Problem Relation Age of Onset   • Cancer Paternal Uncle         Lung cancer   • Lung Disease Mother    • Heart Disease Mother    • Hypertension Mother    • Diabetes Father    • Heart Disease Father    • Psychiatric Illness Sister    • Hypertension Sister    • Lung Disease Paternal Aunt    • Hypertension Paternal Aunt    • Psychiatric Illness Maternal Grandfather    • Stroke Neg Hx        Social History     Socioeconomic History   • Marital status:      Spouse name: Not on file   • Number of children: 2   • Years of education: Not on file   • Highest education level: Not on file   Occupational History   • Not on file   Social Needs   • Financial resource strain: Not on file   • Food insecurity:     Worry: Not on file     Inability: Not on file   • Transportation needs:     Medical: Not on file     Non-medical: Not on file   Tobacco Use   • Smoking status: Former Smoker     Packs/day: 1.00     Years: 37.00     Pack years: 37.00     Types: Cigarettes     Start date: 7/29/1985     Last attempt to quit: 7/29/2012     Years since  quittin.1   • Smokeless tobacco: Never Used   Substance and Sexual Activity   • Alcohol use: Yes     Alcohol/week: 0.0 oz     Comment: occ   • Drug use: No   • Sexual activity: Yes     Partners: Female     Comment: , retired [employer]   Lifestyle   • Physical activity:     Days per week: Not on file     Minutes per session: Not on file   • Stress: Not on file   Relationships   • Social connections:     Talks on phone: Not on file     Gets together: Not on file     Attends Rastafari service: Not on file     Active member of club or organization: Not on file     Attends meetings of clubs or organizations: Not on file     Relationship status: Not on file   • Intimate partner violence:     Fear of current or ex partner: Not on file     Emotionally abused: Not on file     Physically abused: Not on file     Forced sexual activity: Not on file   Other Topics Concern   • Not on file   Social History Narrative    Retired from [employer]       Review of Systems   Constitutional: Positive for malaise/fatigue and weight loss. Negative for chills and fever.   Cardiovascular: Positive for leg swelling. Negative for chest pain.   Gastrointestinal: Positive for abdominal pain, blood in stool, diarrhea, nausea and vomiting. Negative for heartburn.         Physical Exam:  Vitals:    19 1106 19 1115 19 1116 19 1120   BP: 110/60 107/60 107/60 120/63   Pulse: 100 (Abnormal) 101 (Abnormal) 102 98   Resp: 18 20 (Abnormal) 22 20   Temp: 36.6 °C (97.8 °F) 36.6 °C (97.8 °F)  36.7 °C (98 °F)   TempSrc:       SpO2: 93% 96% 96% 96%   Weight:       Height:           Physical Exam   Constitutional: He is oriented to person, place, and time. No distress.   Pale     Cardiovascular: Normal rate.   Pulmonary/Chest: No respiratory distress.   Abdominal: Soft. He exhibits distension and mass. There is tenderness.   Musculoskeletal: He exhibits edema.   Neurological: He is alert and oriented to person, place, and time.   Skin: He  is not diaphoretic.         Labs:  Recent Labs     09/05/19  0920   WBC 7.5   RBC 2.59*   HEMOGLOBIN 7.2*   HEMATOCRIT 23.3*   MCV 90.0   MCH 27.8   MCHC 30.9*   RDW 47.1   PLATELETCT 342   MPV 9.9     Recent Labs     09/03/19  1201 09/05/19  0920   SODIUM 139 137   POTASSIUM 5.8* 4.1   CHLORIDE 107 106   CO2 22 21   GLUCOSE 89 92   BUN 23* 23*     Recent Labs     09/05/19  0920   APTT 58.1*   INR 1.81*         Imaging:  No orders to display             Impressions:  64 years old female with history of heart failure, ejection fraction 15%, status post mitral valve repair on warfarin, dedifferentiated liposarcoma of mesentery diagnosed in April 2019 presented to the emergency department complaining of 4 to 3 weeks history of diarrhea, recently noticed a blood with his a stool, anemia.  His CT scan showed enlargement of the tumor, he was nonsurgical because of close proximity to the SMA, most recent CT scan Bailey wrapped around the SMA.  Differential diagnosis for diarrhea include ischemic bowel, side effect from chemotherapy and infections.      Recommendations:  -Stool studies to rule out C. difficile and parasitic infections.  -Symptomatic management of diarrhea after C. difficile ruled out.  -Monitor hemoglobin, keep hemoglobin more than 7.  -Ordered abdominal CT without contrast.  -We will consider RBC scan if patient will bleed, monitor for bleeding.  -Patient has a poor prognosis given his dedifferentiated liposarcoma, heart failure with ejection fraction of 15% and on Coumadin, recommend palliative care consult to decide goal of cares.  -We will follow-up tomorrow.      This note was generated using voice recognition software which has a small chance of producing errors of grammar and possibly content. I have made every reasonable attempt to find and correct any obvious errors, but expect that some may not be found prior to finalization of this note.

## 2019-09-05 NOTE — ED NOTES
Blood sent for trop and second ABO typing.  Awaiting further orders/ bed assignment .  Pt was spoken with about the need for transfusion of blood.  Pt stated that he has no Sabianist, or otherwise, objection to having transfusion of blood.  Given opportunity to ask questions and family member at bedside was also encouraged to ask questions.  Pt remains on cardiac monitor.  Safety maintained. Will continue to monitor.

## 2019-09-05 NOTE — ED NOTES
Med Rec completed per patient   Allergies reviewed  No ORAL antibiotics in last 14 days    Patient takes Warfarin   1 mg on Sunday, Tuesday, and Thursday  1.5 mg all other days

## 2019-09-05 NOTE — ED NOTES
Pt was given information and opportunity to ask any questions about blood transfusion.  Pt signed to consent.  Awaiting bed assignment.

## 2019-09-05 NOTE — PROGRESS NOTES
Bedside report received from ED RN; assumed pt care. Pt assessment complete. Pt A&O x4. Reviewed plan of care with pt. Tele box on and rhythm verified. Chart and labs reviewed. Bed in lowest position, and 2 side rails up. Pt educated on call light; call light with in reach.

## 2019-09-05 NOTE — ASSESSMENT & PLAN NOTE
Likely lower GI but with history of dark stool cannot rule out more proximal bleed  Patient will be admitted and closely monitored on telemetry  We will start him on IV fluids for hydration  He is receiving 1 unit of packed RBC and will monitor his hemoglobin and transfuse if less than 7  We will hold anticoagulation his INR is currently subtherapeutic  Switch to oral Protonix per GI  GI recommended no invasive procedures since he is high risk  I reviewed his last CT of abdomen and pelvis from August 2019 revealing increase of his mesenteric tumor but no bowel obstruction or thickening     GI cleared patient for warfarin but I will monitor his hemoglobin for 1 day before starting it.

## 2019-09-05 NOTE — CARE PLAN
Problem: Bowel/Gastric:  Goal: Normal bowel function is maintained or improved  Outcome: NOT MET  Pt has been experiencing small, loose stools. Waiting for stool sample for C diff testing      Problem: Safety  Goal: Will remain free from injury  Outcome: PROGRESSING AS EXPECTED

## 2019-09-05 NOTE — H&P
Hospital Medicine History & Physical Note    Date of Service  9/5/2019    Primary Care Physician  Tai Fitch M.D.    Consultants  GI Dr Toribio    Code Status  DNR/DNI    Chief Complaint  Rectal bleeding    History of Presenting Illness  64 y.o. male who presented 9/5/2019 with history of mesenteric stromal tumor for which he follows up with Dr. Dupont, and history of nonischemic cardiomyopathy last EF 15% as well as mitral valve repair and antiphospholipid syndrome has been maintained on anticoagulation with warfarin presented with recurrent episodes of hematochezia.  He reports that over the past week he has been having loose stool he has noted intermittent dark stool and recurrent episodes of hematochezia.  He has been feeling lightheaded and fatigued.  He denies any chest pain or loss of consciousness.  He presented to the emergency room.  He has had significant weight loss recently and has noted increase in his abdominal tumor.  He has been referred to [facility] and was scheduled to start new chemotherapy this Saturday.  Reports he has been compliant with warfarin except for today he did not take his dose this morning.  On presentation to the emergency room systolic blood pressure was in the 90s improved with 1 L bolus.  He has had nausea intermittently and one episode of emesis yesterday no hematemesis or coffee-ground emesis  .  Review of Systems  Review of Systems   All other systems reviewed and are negative.      Past Medical History   has a past medical history of Antiphospholipid syndrome ([facility]) (3/15/2010), ASTHMA, Cancer ([facility]) (2014), Chronic anticoagulation, Congestive heart failure ([facility]), CVA (cerebral vascular accident) ([facility]) (7/28/2015), Dental disorder, Epilepsy ([facility]) (5/14/2018), Hemorrhagic disorder ([facility]), Hypertension, LBBB (left bundle branch block), LV dysfunction (April 2015), Mitral regurgitation (April 2015), S/P mitral valve repair, Seizure disorder ([facility]) (2007), Snoring, TIA  (transient ischemic attack) (2007/2008), and Tremors of nervous system.    Surgical History   has a past surgical history that includes inguinal hernia repair (4/20/2010); cholecystectomy; recovery (5/15/2015); and mitral valve repair (7/20/2015).     Family History  family history includes Cancer in his paternal uncle; Diabetes in his father; Heart Disease in his father and mother; Hypertension in his mother, paternal aunt, and sister; Lung Disease in his mother and paternal aunt; Psychiatric Illness in his maternal grandfather and sister.     Social History   reports that he quit smoking about 7 years ago. His smoking use included cigarettes. He started smoking about 34 years ago. He has a 37.00 pack-year smoking history. He has never used smokeless tobacco. He reports that he drinks alcohol. He reports that he does not use drugs.    Allergies  No Known Allergies    Medications  Prior to Admission Medications   Prescriptions Last Dose Informant Patient Reported? Taking?   carvedilol (COREG) 12.5 MG Tab 9/4/2019 at PM Patient No No   Sig: Take 1 Tab by mouth 2 times a day, with meals.   lacosamide (VIMPAT) 200 MG Tab tablet 9/4/2019 at PM Patient Yes Yes   Sig: Take 200 mg by mouth 2 Times a Day.   levetiracetam (KEPPRA) 1000 MG tablet 9/4/2019 at PM Patient No No   Sig: Take 2 Tabs by mouth 2 Times a Day.   lisinopril (PRINIVIL) 2.5 MG Tab 9/4/2019 at PM Patient No No   Sig: Take 1 Tab by mouth every day.   multivitamin (THERAGRAN) Tab 9/4/2019 at AM Patient Yes No   Sig: Take 1 Tab by mouth every day.   potassium chloride SA (K-DUR) 10 MEQ Tab CR 9/4/2019 at AM Patient No No   Sig: Take 1 Tab by mouth every day.   senna-docusate (PERICOLACE OR SENOKOT S) 8.6-50 MG Tab PRN at PRN Patient Yes Yes   Sig: Take 1-2 Tabs by mouth 1 time daily as needed.   spironolactone (ALDACTONE) 25 MG Tab 9/4/2019 at AM Patient No No   Sig: Take 0.5 Tabs by mouth every day.   torsemide (DEMADEX) 5 MG Tab 9/4/2019 at AM Patient No No    Sig: Take 1 Tab by mouth every day.   warfarin (COUMADIN) 1 MG Tab 9/4/2019 at AFTERNOON Patient Yes Yes   Sig: Take 1-1.5 mg by mouth every day. 1 mg on Sunday, Tuesday, and Thursday  1.5 mg all other days      Facility-Administered Medications: None       Physical Exam  Temp:  [36.1 °C (97 °F)-36.7 °C (98 °F)] 36.1 °C (97 °F)  Pulse:  [] 101  Resp:  [14-24] 17  BP: ()/(49-67) 113/67  SpO2:  [93 %-97 %] 93 %    Physical Exam   Constitutional: He is oriented to person, place, and time. He appears well-developed and well-nourished.   HENT:   Head: Normocephalic and atraumatic.   Right Ear: External ear normal.   Left Ear: External ear normal.   Mouth/Throat: No oropharyngeal exudate.   Eyes: Conjunctivae are normal. Right eye exhibits no discharge. Left eye exhibits no discharge. No scleral icterus.   Neck: Neck supple. No JVD present. No tracheal deviation present.   Cardiovascular: Normal rate and regular rhythm. Exam reveals no gallop and no friction rub.   Murmur heard.  Pulmonary/Chest: Effort normal and breath sounds normal. No stridor. No respiratory distress. He has no wheezes. He has no rales. He exhibits no tenderness.   Abdominal: Soft. Bowel sounds are normal. He exhibits distension and mass. There is no tenderness. There is no rebound and no guarding.   Musculoskeletal: He exhibits edema (2+ bilateral lower extremities per patient unchanged). He exhibits no tenderness.   Neurological: He is alert and oriented to person, place, and time. No cranial nerve deficit. He exhibits normal muscle tone.   Skin: Skin is warm and dry. He is not diaphoretic. No cyanosis. There is pallor. Nails show no clubbing.   Psychiatric: He has a normal mood and affect. His behavior is normal. Thought content normal.   Nursing note and vitals reviewed.      Laboratory:  Recent Labs     09/05/19  0920   WBC 7.5   RBC 2.59*   HEMOGLOBIN 7.2*   HEMATOCRIT 23.3*   MCV 90.0   MCH 27.8   MCHC 30.9*   RDW 47.1    PLATELETCT 342   MPV 9.9     Recent Labs     09/03/19  1201 09/05/19  0920   SODIUM 139 137   POTASSIUM 5.8* 4.1   CHLORIDE 107 106   CO2 22 21   GLUCOSE 89 92   BUN 23* 23*   CREATININE 1.03 0.99   CALCIUM 8.1* 8.1*     Recent Labs     09/03/19  1201 09/05/19  0920   ALTSGPT 14 14   ASTSGOT 30 23   ALKPHOSPHAT 166* 135*   TBILIRUBIN 1.2 1.1   GLUCOSE 89 92     Recent Labs     09/05/19  0920   APTT 58.1*   INR 1.81*     No results for input(s): NTPROBNP in the last 72 hours.      Recent Labs     09/05/19  0920   TROPONINT 70*       Urinalysis:    No results found     Imaging:  CTA ABDOMEN PELVIS W & W/O POST PROCESS    (Results Pending)         Assessment/Plan:  I anticipate this patient will require at least two midnights for appropriate medical management, necessitating inpatient admission.    * GI bleed  Assessment & Plan  Likely lower GI but with history of dark stool cannot rule out more proximal bleed  Patient will be admitted and closely monitored on telemetry  We will start him on IV fluids for hydration  He is receiving 1 unit of packed RBC and will monitor his hemoglobin and transfuse if less than 7  We will hold anticoagulation his INR is currently subtherapeutic  I will start him on twice daily IV Protonix  Will await GI evaluation further recommendations   I reviewed his last CT of abdomen and pelvis from August 2019 revealing increase of his mesenteric tumor but no bowel obstruction or thickening     He will need to resume anticoagulation once bleeding is resolved given his history of mitral valve repair cardiomyopathy and antiphospholipid syndrome timing will depend  on his clinical course and endoscopy findings    Antiphospholipid syndrome (HCC)- (present on admission)  Assessment & Plan  Hold warfarin at this time given GI bleed    Seizure (HCC)- (present on admission)  Assessment & Plan  Continue Vimpat and Keppra    Elevated troponin  Assessment & Plan  Likely demand ischemia in setting of GI  bleed and hypotension      Hypotension due to hypovolemia  Assessment & Plan  Responded to IV fluids and transfusion    Monitor fluid intake and output given history of cardiomyopathy    Diarrhea  Assessment & Plan  Patient denies any recent antibiotic use no leukocytosis  Has had diarrhea on and off for several month per patient    Anemia associated with acute blood loss  Assessment & Plan  Monitor hemoglobin and transfuse if less than 8    Gastrointestinal stromal tumor (GIST) (HCC)- (present on admission)  Assessment & Plan  With an enlarging tumor on his last CT  Follows up with Dr. Dupont and has also been evaluated at [place] and reports that he was scheduled to start and you chemotherapy infusion the Saturday    S/P mitral valve repair- (present on admission)  Assessment & Plan  We will hold anticoagulation given anemia and GI bleed    Chronic systolic congestive heart failure, NYHA class 2 (HCC)- (present on admission)  Assessment & Plan  Given hypotension on presentation we will hold carvedilol lisinopril and diuretics  Monitor fluid status        Overall prognosis is guarded discussed CODE STATUS with the patient his wishes are to be DNR/DNI  We will also consult palliative care to assist with completing advanced directives    VTE prophylaxis: SCD

## 2019-09-05 NOTE — TELEPHONE ENCOUNTER
Received call from TEZ Salter with [facility]. She would like to discuss pt with either Erendira Tai (pt was going to see today) or Dr. Jaeger (saw pt on 8/15/19). Please call on her cell [phone].    To NISHA and TT

## 2019-09-05 NOTE — ED TRIAGE NOTES
Pt to triage with   Chief Complaint   Patient presents with   • Bloody Stools     pt has sarcoma; pt reports that he has blood bright red in stool this morning; saturday morning scheduled infusion   • Diarrhea     last 2 wks; dark stool     Pt pale, pt reports that he has last 30 lbs in the last 2 months. Pt Informed regarding triage process and verbalized understanding to inform triage tech or RN for any changes in condition. Placed in lobby.

## 2019-09-06 LAB
ANION GAP SERPL CALC-SCNC: 8 MMOL/L (ref 0–11.9)
BASOPHILS # BLD AUTO: 0.1 % (ref 0–1.8)
BASOPHILS # BLD: 0.01 K/UL (ref 0–0.12)
BUN SERPL-MCNC: 21 MG/DL (ref 8–22)
C DIFF DNA SPEC QL NAA+PROBE: NEGATIVE
C DIFF TOX GENS STL QL NAA+PROBE: NEGATIVE
CALCIUM SERPL-MCNC: 7.6 MG/DL (ref 8.5–10.5)
CHLORIDE SERPL-SCNC: 108 MMOL/L (ref 96–112)
CO2 SERPL-SCNC: 21 MMOL/L (ref 20–33)
CREAT SERPL-MCNC: 0.86 MG/DL (ref 0.5–1.4)
EOSINOPHIL # BLD AUTO: 0.04 K/UL (ref 0–0.51)
EOSINOPHIL NFR BLD: 0.5 % (ref 0–6.9)
ERYTHROCYTE [DISTWIDTH] IN BLOOD BY AUTOMATED COUNT: 47.8 FL (ref 35.9–50)
GLUCOSE SERPL-MCNC: 104 MG/DL (ref 65–99)
HCT VFR BLD AUTO: 25.3 % (ref 42–52)
HGB BLD-MCNC: 7.7 G/DL (ref 14–18)
HGB BLD-MCNC: 8.2 G/DL (ref 14–18)
HGB BLD-MCNC: 8.3 G/DL (ref 14–18)
IMM GRANULOCYTES # BLD AUTO: 0.02 K/UL (ref 0–0.11)
IMM GRANULOCYTES NFR BLD AUTO: 0.3 % (ref 0–0.9)
INR PPP: 1.82 (ref 0.87–1.13)
LYMPHOCYTES # BLD AUTO: 1.01 K/UL (ref 1–4.8)
LYMPHOCYTES NFR BLD: 13.5 % (ref 22–41)
MCH RBC QN AUTO: 27.6 PG (ref 27–33)
MCHC RBC AUTO-ENTMCNC: 30.4 G/DL (ref 33.7–35.3)
MCV RBC AUTO: 90.7 FL (ref 81.4–97.8)
MONOCYTES # BLD AUTO: 0.7 K/UL (ref 0–0.85)
MONOCYTES NFR BLD AUTO: 9.3 % (ref 0–13.4)
NEUTROPHILS # BLD AUTO: 5.72 K/UL (ref 1.82–7.42)
NEUTROPHILS NFR BLD: 76.3 % (ref 44–72)
NRBC # BLD AUTO: 0 K/UL
NRBC BLD-RTO: 0 /100 WBC
PLATELET # BLD AUTO: 318 K/UL (ref 164–446)
PMV BLD AUTO: 9.7 FL (ref 9–12.9)
POTASSIUM SERPL-SCNC: 4.1 MMOL/L (ref 3.6–5.5)
PROTHROMBIN TIME: 21.6 SEC (ref 12–14.6)
RBC # BLD AUTO: 2.79 M/UL (ref 4.7–6.1)
SODIUM SERPL-SCNC: 137 MMOL/L (ref 135–145)
WBC # BLD AUTO: 7.5 K/UL (ref 4.8–10.8)

## 2019-09-06 PROCEDURE — 770020 HCHG ROOM/CARE - TELE (206)

## 2019-09-06 PROCEDURE — 700102 HCHG RX REV CODE 250 W/ 637 OVERRIDE(OP): Performed by: HOSPITALIST

## 2019-09-06 PROCEDURE — 700111 HCHG RX REV CODE 636 W/ 250 OVERRIDE (IP): Performed by: HOSPITALIST

## 2019-09-06 PROCEDURE — 80048 BASIC METABOLIC PNL TOTAL CA: CPT

## 2019-09-06 PROCEDURE — A9270 NON-COVERED ITEM OR SERVICE: HCPCS | Performed by: STUDENT IN AN ORGANIZED HEALTH CARE EDUCATION/TRAINING PROGRAM

## 2019-09-06 PROCEDURE — A9270 NON-COVERED ITEM OR SERVICE: HCPCS | Performed by: HOSPITALIST

## 2019-09-06 PROCEDURE — C9113 INJ PANTOPRAZOLE SODIUM, VIA: HCPCS | Performed by: HOSPITALIST

## 2019-09-06 PROCEDURE — 36415 COLL VENOUS BLD VENIPUNCTURE: CPT

## 2019-09-06 PROCEDURE — 85018 HEMOGLOBIN: CPT | Mod: 91

## 2019-09-06 PROCEDURE — 85610 PROTHROMBIN TIME: CPT

## 2019-09-06 PROCEDURE — 700102 HCHG RX REV CODE 250 W/ 637 OVERRIDE(OP): Performed by: STUDENT IN AN ORGANIZED HEALTH CARE EDUCATION/TRAINING PROGRAM

## 2019-09-06 PROCEDURE — 99233 SBSQ HOSP IP/OBS HIGH 50: CPT | Performed by: HOSPITALIST

## 2019-09-06 PROCEDURE — 700102 HCHG RX REV CODE 250 W/ 637 OVERRIDE(OP): Performed by: EMERGENCY MEDICINE

## 2019-09-06 PROCEDURE — A9270 NON-COVERED ITEM OR SERVICE: HCPCS | Performed by: EMERGENCY MEDICINE

## 2019-09-06 PROCEDURE — 85025 COMPLETE CBC W/AUTO DIFF WBC: CPT

## 2019-09-06 PROCEDURE — 700105 HCHG RX REV CODE 258: Performed by: HOSPITALIST

## 2019-09-06 RX ORDER — LOPERAMIDE HYDROCHLORIDE 2 MG/1
4 CAPSULE ORAL ONCE
Status: COMPLETED | OUTPATIENT
Start: 2019-09-06 | End: 2019-09-06

## 2019-09-06 RX ORDER — CHOLESTYRAMINE LIGHT 4 G/5.7G
1 POWDER, FOR SUSPENSION ORAL 2 TIMES DAILY
Status: DISCONTINUED | OUTPATIENT
Start: 2019-09-06 | End: 2019-09-08

## 2019-09-06 RX ORDER — OMEPRAZOLE 20 MG/1
20 CAPSULE, DELAYED RELEASE ORAL 2 TIMES DAILY
Status: DISCONTINUED | OUTPATIENT
Start: 2019-09-06 | End: 2019-09-08

## 2019-09-06 RX ADMIN — CHOLESTYRAMINE 4 G: 4 POWDER, FOR SUSPENSION ORAL at 12:53

## 2019-09-06 RX ADMIN — SODIUM CHLORIDE: 9 INJECTION, SOLUTION INTRAVENOUS at 08:05

## 2019-09-06 RX ADMIN — LOPERAMIDE HYDROCHLORIDE 4 MG: 2 CAPSULE ORAL at 11:24

## 2019-09-06 RX ADMIN — CHOLESTYRAMINE 4 G: 4 POWDER, FOR SUSPENSION ORAL at 20:07

## 2019-09-06 RX ADMIN — LACOSAMIDE 200 MG: 100 TABLET, FILM COATED ORAL at 17:52

## 2019-09-06 RX ADMIN — OMEPRAZOLE 20 MG: 20 CAPSULE, DELAYED RELEASE ORAL at 17:52

## 2019-09-06 RX ADMIN — PANTOPRAZOLE SODIUM 40 MG: 40 INJECTION, POWDER, LYOPHILIZED, FOR SOLUTION INTRAVENOUS at 04:57

## 2019-09-06 RX ADMIN — LACOSAMIDE 200 MG: 100 TABLET, FILM COATED ORAL at 04:57

## 2019-09-06 RX ADMIN — LEVETIRACETAM 2000 MG: 500 TABLET, FILM COATED ORAL at 17:52

## 2019-09-06 RX ADMIN — LEVETIRACETAM 2000 MG: 500 TABLET, FILM COATED ORAL at 04:57

## 2019-09-06 RX ADMIN — OMEPRAZOLE 20 MG: 20 CAPSULE, DELAYED RELEASE ORAL at 11:26

## 2019-09-06 ASSESSMENT — ENCOUNTER SYMPTOMS
TREMORS: 0
PND: 0
WEIGHT LOSS: 1
ABDOMINAL PAIN: 1
HEARTBURN: 0
WHEEZING: 0
DEPRESSION: 0
CHILLS: 0
SORE THROAT: 0
CLAUDICATION: 0
HEADACHES: 0
PHOTOPHOBIA: 0
PALPITATIONS: 0
FLANK PAIN: 0
SEIZURES: 0
LOSS OF CONSCIOUSNESS: 0
BRUISES/BLEEDS EASILY: 0
FEVER: 0
HALLUCINATIONS: 0
SPUTUM PRODUCTION: 0
WEAKNESS: 1
MYALGIAS: 0
VOMITING: 0
DOUBLE VISION: 0
STRIDOR: 0
EYE PAIN: 0
HEMOPTYSIS: 0
FALLS: 0
CONSTIPATION: 0
NAUSEA: 1
POLYDIPSIA: 0
NECK PAIN: 0
TINGLING: 0
SHORTNESS OF BREATH: 0
COUGH: 0
DIARRHEA: 0
BLURRED VISION: 0
BLOOD IN STOOL: 1
DIZZINESS: 1
DIAPHORESIS: 0
BACK PAIN: 0
ORTHOPNEA: 0
SINUS PAIN: 0

## 2019-09-06 ASSESSMENT — LIFESTYLE VARIABLES: SUBSTANCE_ABUSE: 0

## 2019-09-06 NOTE — DIETARY
"Nutrition services: Day 1 of admit.  Roque Nguyen is a 64 y.o. male with admitting DX of GI bleed.   Consult received for 24-33# wt loss x 1 month, decreased appetite.     Spoke w/ pt at bedside to review appetite, PO and wt hx. Pt reports decreased appetite/PO x 3 weeks r/t mass and diarrhea. States that he has lost 35# in the last 3 weeks. Reports a UBW of 195# 3 weeks ago. Reports regular, adequate appetite/PO 1 month ago. Pt drinks ensure at home and would like Boost TID between meals. Spoke w/ MD and obtained orders.     Assessment:  Height: 167.6 cm (5' 6\")  Weight: 75.4 kg (166 lb 3.6 oz)- Standing.   Body mass index is 26.83 kg/m²., BMI classification: Overweight  Diet/Intake: Cardiac; No documented intake since admit.     Evaluation:   1. Noted pt with seizures, antiphospholipid syndrome, anemia r/t acute blood loss, GI bleed, diarrhea, hypotension, elevated troponin, gastrointestinal stromal tumor, s/p mitral valve repair, CHF.   2. Pt with poor PO prior to admit x 3 weeks.   3. GI: Per pt, has had loose stools x 3 week PTA.   4. Labs & Meds: Reviewed.  5. Wt hx: Current (9/5): 75.4 kg, standing; 8/13/19: 81.2 kg; 4/23/19: 87.1 kg; Pt with 7% wt loss x 1 month (severe).   6. Despite severe wt loss and decreased PO, pt appears well nourished.     Malnutrition Risk: Pt with severe malnutrition in the context of acute illness r/t acute decrease in appetite and diarrhea as evidenced by 7% wt loss x 1 month and PO of < 50% for > 5 days.     Recommendations/Plan:  1. Continue with current diet as ordered. RD to add supplements as snacks TID.   2. Encourage intake of meals/supplements.   3. Document intake of all meals/supplements as % taken in ADL's to provide interdisciplinary communication across all shifts.   4. Monitor weight.  5. Nutrition representative to see for menu selections.     RD Following.                 "

## 2019-09-06 NOTE — RESPIRATORY CARE
COPD EDUCATION by COPD CLINICAL EDUCATOR  9/6/2019 at 6:54 AM by Jackie Royal     Patient reviewed by COPD education team. Patient does not have a history or diagnosis of COPD and is a non-smoker, therefore does not qualify for the COPD program.

## 2019-09-06 NOTE — CARE PLAN
Problem: Safety  Goal: Will remain free from injury  Outcome: PROGRESSING AS EXPECTED  Note:   Patient is free from injury and fall at this point. Patient calls appropriately for assistance.      Problem: Knowledge Deficit  Goal: Knowledge of disease process/condition, treatment plan, diagnostic tests, and medications will improve  Intervention: Explain information regarding disease process/condition, treatment plan, diagnostic tests, and medications and document in education  Note:   Patient is aware of need for PRBC for low hgb and samples to assess for potential infections.

## 2019-09-06 NOTE — PROGRESS NOTES
Date of Consultation:  9/6/2019    Patient: : Roque Nguyen  MRN: [id number]    Referring Physician: Daric Urbano M.D.       GI:Amanda Gaitan M.D.     Reason for Consultation:Hematochezia, bloody diarrhea.    History of Present Illness:    Roque Nguyen is a 64 y.o. male with medical history of dedifferentiated liposarcoma of mesentery, status post mitral valve repair on warfarin, currently undergoing chemotherapy presented to the emergency department because of 3 weeks history of watery diarrhea and average 10 times per day with several accidents associated with nausea and vomiting, patient described intermittent bloody stools over the last 3 to 4 days but not today.  Stool is more blackish, denies pain following eating.  Patient was evaluated in April 2019 for abdominal mass with ultrasound finding of large 11 cm tumor in the abdomen, diagnosed with GI ST, started on imatinib because his tumor was not resectable due to proximity to SMA.  Follow-up CT scan showed progression of the disease and the tumor wrapped around the SMA, path review at [facility] confirm the diagnosis of dedifferentiated liposarcoma.  His chemotherapy was switched to gemcitabine and docetaxel.  His most recent echocardiogram showed ejection fraction of 15%.     Otherwise the patient is doing fine without complaints of fever/ chills/ dysphagia/ odynophagia/ heartburn.    Interval progress:   9/6/2019: H&H stable, C. difficile negative, stool white BC negative, CT scan showed increase in the mass size with possible pneumonia resolving??.  Stool O&P pending.      Past Medical History:   Diagnosis Date   • Epilepsy (HCC) 5/14/2018   • CVA (cerebral vascular accident) (HCC) 7/28/2015   • LV dysfunction April 2015    Echocardiogram with normal LV size, LVEF 40%. Severely dilated LA. Severe MR, mild TR. RVSP 45mmHg   • Mitral regurgitation April 2015    Echocardiogram with severe MR   • Cancer (HCC) 2014    Prostate cancer   •  Antiphospholipid syndrome (HCC) 3/15/2010   • Seizure disorder (HCC) 2007    Initial diagnosis, last seizure in 2012; followed by neurology, on [facility].   • ASTHMA    • Chronic anticoagulation    • Congestive heart failure (HCC)    • Dental disorder     Broken   • Hemorrhagic disorder (HCC)    • Hypertension     On Lisinopril   • LBBB (left bundle branch block)    • S/P mitral valve repair    • Snoring    • TIA (transient ischemic attack) 2007/2008    Antiphospholipid antibody   • Tremors of nervous system     mostly left sided         Past Surgical History:   Procedure Laterality Date   • MITRAL VALVE REPAIR  7/20/2015    Procedure: MITRAL VALVE REPAIR  with MICHAEL;  Surgeon: Gina Sim M.D.;  Location: SURGERY [facility];  Service:    • RECOVERY  5/15/2015    Procedure: MICHAEL-[id number]  MITRAL REGURGITATION;  Surgeon: Ir[facility] Surgery;  Location: SURGERY [facility];  Service:    • INGUINAL HERNIA REPAIR  4/20/2010    Performed by EJ STILL at SURGERY SAME DAY [facility]   • CHOLECYSTECTOMY         Family History   Problem Relation Age of Onset   • Cancer Paternal Uncle         Lung cancer   • Lung Disease Mother    • Heart Disease Mother    • Hypertension Mother    • Diabetes Father    • Heart Disease Father    • Psychiatric Illness Sister    • Hypertension Sister    • Lung Disease Paternal Aunt    • Hypertension Paternal Aunt    • Psychiatric Illness Maternal Grandfather    • Stroke Neg Hx        Social History     Socioeconomic History   • Marital status:      Spouse name: Not on file   • Number of children: 2   • Years of education: Not on file   • Highest education level: Not on file   Occupational History   • Not on file   Social Needs   • Financial resource strain: Not on file   • Food insecurity:     Worry: Not on file     Inability: Not on file   • Transportation needs:     Medical: Not on file     Non-medical: Not on file   Tobacco Use   • Smoking status: Former  Smoker     Packs/day: 1.00     Years: 37.00     Pack years: 37.00     Types: Cigarettes     Start date: 1985     Last attempt to quit: 2012     Years since quittin.1   • Smokeless tobacco: Never Used   Substance and Sexual Activity   • Alcohol use: Yes     Alcohol/week: 0.0 oz     Comment: occ   • Drug use: No   • Sexual activity: Yes     Partners: Female     Comment: , retired [employer]   Lifestyle   • Physical activity:     Days per week: Not on file     Minutes per session: Not on file   • Stress: Not on file   Relationships   • Social connections:     Talks on phone: Not on file     Gets together: Not on file     Attends Restorationist service: Not on file     Active member of club or organization: Not on file     Attends meetings of clubs or organizations: Not on file     Relationship status: Not on file   • Intimate partner violence:     Fear of current or ex partner: Not on file     Emotionally abused: Not on file     Physically abused: Not on file     Forced sexual activity: Not on file   Other Topics Concern   • Not on file   Social History Narrative    Retired from [employer]       Review of Systems   Constitutional: Positive for weight loss. Negative for fever.   Cardiovascular: Negative for chest pain.   Gastrointestinal: Positive for abdominal pain and nausea. Negative for vomiting.   Neurological: Negative for seizures and loss of consciousness.         Physical Exam:  Vitals:    19 1957 19 2358 19 0410 19 0800   BP: 120/56 118/63 125/71 112/60   Pulse: (Abnormal) 114 (Abnormal) 102 (Abnormal) 105 (Abnormal) 112   Resp: 16 16 16 18   Temp: 37 °C (98.6 °F) 36.1 °C (97 °F) 36.3 °C (97.3 °F) 36.1 °C (97 °F)   TempSrc: Temporal Temporal Temporal Temporal   SpO2: 91% 91% 91% 91%   Weight:       Height:           Physical Exam   Constitutional: He is oriented to person, place, and time. No distress.   Pulmonary/Chest: Effort normal. No respiratory distress.   Abdominal: Soft. He  exhibits distension and mass. There is tenderness.   Neurological: He is alert and oriented to person, place, and time.   Skin: He is not diaphoretic.         Labs:  Recent Labs     09/05/19 0920 09/05/19  1925 09/06/19  0336   WBC 7.5  --  7.5   RBC 2.59*  --  2.79*   HEMOGLOBIN 7.2* 7.5* 7.7*   HEMATOCRIT 23.3*  --  25.3*   MCV 90.0  --  90.7   MCH 27.8  --  27.6   MCHC 30.9*  --  30.4*   RDW 47.1  --  47.8   PLATELETCT 342  --  318   MPV 9.9  --  9.7     Recent Labs     09/03/19  1201 09/05/19 0920 09/06/19  0336   SODIUM 139 137 137   POTASSIUM 5.8* 4.1 4.1   CHLORIDE 107 106 108   CO2 22 21 21   GLUCOSE 89 92 104*   BUN 23* 23* 21     Recent Labs     09/05/19 0920 09/06/19  0336   APTT 58.1*  --    INR 1.81* 1.82*         Imaging:  CTA ABDOMEN PELVIS W & W/O POST PROCESS   Final Result      1.  Interval increase in size of large heterogeneous mesenteric mass since prior exam.   2.  Minimal ascites again noted.   3.  Fatty infiltration of liver.   4.  Bilateral lower lobe infiltrates suggesting pneumonia, with small RIGHT pleural effusion.  Pneumonia appears somewhat improved from prior exam.                Impressions:  64 years old female with history of heart failure, ejection fraction 15%, status post mitral valve repair on warfarin, dedifferentiated liposarcoma of mesentery diagnosed in April 2019 presented to the emergency department complaining of 4 to 3 weeks history of diarrhea, recently noticed a blood with his a stool, anemia.  His CT scan showed enlargement of the tumor, he was nonsurgical because of close proximity to the SMA, most recent CT scan Bailey wrapped around the SMA.  -GI bleed  -Antiphospholipid syndrome  -Chronic systolic congestive heart failure  -S/P mitral valve repair  -Dedifferentiated liposarcoma.  -Anemia associated with blood loss  -Diarrhea        Recommendations:  -C. difficile negative, stool white BC negative, CT scan showed increasing mass size.  -Stool O&P pending, please  follow up, re-consult if positive.  -Symptomatic management of diarrhea with cholestyramine 4 g twice daily and Imodium morning time immediately after waking up.  -Protonix switched to oral PPI , Coumadin can be restarted cautionly if there is no other contraindication with close monitoring as the patient is high risk.  -Patient has a poor prognosis, I am recommending palliative care consult to decide goal of care.  - GI TO SIGN OFF / STAND BY - Thank you very much for allowing me to participate in the care of your patient.  Please feel free to contact me anytime at [phone]        This note was generated using voice recognition software which has a small chance of producing errors of grammar and possibly content. I have made every reasonable attempt to find and correct any obvious errors, but expect that some may not be found prior to finalization of this note.

## 2019-09-06 NOTE — CARE PLAN
Problem: Infection  Goal: Will remain free from infection  Note:   Continue to monitor VS and daily labs     Problem: Knowledge Deficit  Goal: Knowledge of disease process/condition, treatment plan, diagnostic tests, and medications will improve  Note:   RN will assess knowledge of disease process and provide education as appropriate.

## 2019-09-06 NOTE — PROGRESS NOTES
[facility] Daily Progress Note    Date of Service  9/6/2019    Chief Complaint  64 y.o. 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 Dupont, and history of nonischemic cardiomyopathy last EF 15% as well as mitral valve repair and antiphospholipid syndrome has been maintained on anticoagulation with warfarin presented with recurrent episodes of hematochezia.    Hospital Course    Upon arrival to the emergency room his blood pressure was 120/63, pulse 98.  WBC was 7.5, hemoglobin 7.2.  He was started on IV fluids and given 1 unit of transfusion.      Interval Problem Update  9/6: GI was consulted and patient determined that he is too high risk for endoscopy procedure.  Patient's hemodynamics and hemoglobin have been stable.  We will continue to monitor his vitals and hemoglobin.  Likely restart warfarin tomorrow if no evidence of bleeding.    Consultants/Specialty  Gastroenterology    Code Status  DNR/DNI    Disposition  TBD    Review of Systems  Review of Systems   Constitutional: Positive for malaise/fatigue and weight loss. Negative for chills, diaphoresis and fever.   HENT: Negative for congestion, ear discharge, ear pain, hearing loss, nosebleeds, sinus pain, sore throat and tinnitus.    Eyes: Negative for blurred vision, double vision, photophobia and pain.   Respiratory: Negative for cough, hemoptysis, sputum production, shortness of breath, wheezing and stridor.    Cardiovascular: Negative for chest pain, palpitations, orthopnea, claudication, leg swelling and PND.   Gastrointestinal: Positive for abdominal pain, blood in stool and nausea. Negative for constipation, diarrhea, heartburn, melena and vomiting.   Genitourinary: Negative for dysuria, flank pain, frequency, hematuria and urgency.   Musculoskeletal: Negative for back pain, falls, joint pain, myalgias and neck pain.   Skin: Negative for itching and rash.   Neurological: Positive for  dizziness and weakness. Negative for tingling, tremors and headaches.   Endo/Heme/Allergies: Negative for environmental allergies and polydipsia. Does not bruise/bleed easily.   Psychiatric/Behavioral: Negative for depression, hallucinations, substance abuse and suicidal ideas.        Physical Exam  Temp:  [36.1 °C (97 °F)-37 °C (98.6 °F)] 36.2 °C (97.2 °F)  Pulse:  [102-114] 111  Resp:  [16-18] 18  BP: (112-125)/(56-71) 118/59  SpO2:  [91 %-94 %] 94 %    Physical Exam   Constitutional: He is oriented to person, place, and time. He appears well-developed and well-nourished.   HENT:   Head: Normocephalic and atraumatic.   Mouth/Throat: No oropharyngeal exudate.   Eyes: Conjunctivae are normal. No scleral icterus.   Neck: Normal range of motion. Neck supple. No JVD present. No tracheal deviation present. No thyromegaly present.   Cardiovascular: Normal rate, regular rhythm and intact distal pulses. Exam reveals no gallop and no friction rub.   No murmur heard.  Pulmonary/Chest: Effort normal and breath sounds normal. No stridor. No respiratory distress. He has no wheezes. He has no rales. He exhibits no tenderness.   Abdominal: Soft. Bowel sounds are normal. He exhibits distension and mass. There is tenderness. There is no rebound and no guarding.   Musculoskeletal: Normal range of motion. He exhibits no edema.   Lymphadenopathy:     He has no cervical adenopathy.   Neurological: He is alert and oriented to person, place, and time. He has normal reflexes.   Nursing note and vitals reviewed.      Fluids    Intake/Output Summary (Last 24 hours) at 9/6/2019 1621  Last data filed at 9/6/2019 1000  Gross per 24 hour   Intake 120 ml   Output 150 ml   Net -30 ml       Laboratory  Recent Labs     09/05/19  0920 09/05/19  1925 09/06/19  0336 09/06/19  1140   WBC 7.5  --  7.5  --    RBC 2.59*  --  2.79*  --    HEMOGLOBIN 7.2* 7.5* 7.7* 8.2*   HEMATOCRIT 23.3*  --  25.3*  --    MCV 90.0  --  90.7  --    MCH 27.8  --  27.6  --     MCHC 30.9*  --  30.4*  --    RDW 47.1  --  47.8  --    PLATELETCT 342  --  318  --    MPV 9.9  --  9.7  --      Recent Labs     09/05/19 0920 09/06/19  0336   SODIUM 137 137   POTASSIUM 4.1 4.1   CHLORIDE 106 108   CO2 21 21   GLUCOSE 92 104*   BUN 23* 21   CREATININE 0.99 0.86   CALCIUM 8.1* 7.6*     Recent Labs     09/05/19 0920 09/06/19  0336   APTT 58.1*  --    INR 1.81* 1.82*               Imaging  CTA ABDOMEN PELVIS W & W/O POST PROCESS   Final Result      1.  Interval increase in size of large heterogeneous mesenteric mass since prior exam.   2.  Minimal ascites again noted.   3.  Fatty infiltration of liver.   4.  Bilateral lower lobe infiltrates suggesting pneumonia, with small RIGHT pleural effusion.  Pneumonia appears somewhat improved from prior exam.           Assessment/Plan  * GI bleed  Assessment & Plan  Likely lower GI but with history of dark stool cannot rule out more proximal bleed  Patient will be admitted and closely monitored on telemetry  We will start him on IV fluids for hydration  He is receiving 1 unit of packed RBC and will monitor his hemoglobin and transfuse if less than 7  We will hold anticoagulation his INR is currently subtherapeutic  Switch to oral Protonix per GI  GI recommended no invasive procedures since he is high risk  I reviewed his last CT of abdomen and pelvis from August 2019 revealing increase of his mesenteric tumor but no bowel obstruction or thickening     GI cleared patient for warfarin but I will monitor his hemoglobin for 1 day before starting it.    Antiphospholipid syndrome (HCC)- (present on admission)  Assessment & Plan  Hold warfarin at this time given GI bleed    Seizure (HCC)- (present on admission)  Assessment & Plan  Continue Vimpat and Keppra    Elevated troponin  Assessment & Plan  Likely demand ischemia in setting of GI bleed and hypotension      Hypotension due to hypovolemia  Assessment & Plan  Responded to IV fluids and transfusion    Monitor  fluid intake and output given history of cardiomyopathy    Diarrhea  Assessment & Plan  Patient denies any recent antibiotic use no leukocytosis  Has had diarrhea on and off for several month per patient  C. difficile is negative  Other stool studies are pending  GI added cholestyramine    Anemia associated with acute blood loss  Assessment & Plan  Monitor hemoglobin and transfuse if less than 8    Gastrointestinal stromal tumor (GIST) (HCC)- (present on admission)  Assessment & Plan  With an enlarging tumor on his last CT  Follows up with Dr. Dupont and has also been evaluated at [place] and reports that he was scheduled to start and you chemotherapy infusion the Saturday with gemcitabine and docetaxel    S/P mitral valve repair- (present on admission)  Assessment & Plan  We will hold anticoagulation given anemia and GI bleed    Chronic systolic congestive heart failure, NYHA class 2 (HCC)- (present on admission)  Assessment & Plan  Given hypotension on presentation we will hold carvedilol lisinopril and diuretics  Monitor fluid status         VTE prophylaxis: holding warfarin

## 2019-09-06 NOTE — CARE PLAN
Problem: Nutritional:  Goal: Achieve adequate nutritional intake  Description  Patient will consume 50% of meals   Outcome: NOT MET     Please see RD note.

## 2019-09-06 NOTE — PROGRESS NOTES
2 RN Skin Check    2 RN skin check complete.   Devices in place: n/a.  Skin assessed under devices: N\A.  Confirmed pressure ulcers found on: n/a.  New potential pressure ulcers noted on n/a. Wound consult placed N/A.  The following interventions in place Pillows and Lotion.     Pt skin is unremarkable

## 2019-09-06 NOTE — ASSESSMENT & PLAN NOTE
Responded to IV fluids and transfusion    Monitor fluid intake and output given history of cardiomyopathy

## 2019-09-06 NOTE — ASSESSMENT & PLAN NOTE
Patient denies any recent antibiotic use no leukocytosis  Has had diarrhea on and off for several month per patient  C. difficile is negative  Other stool studies are pending  GI added cholestyramine

## 2019-09-07 ENCOUNTER — APPOINTMENT (OUTPATIENT)
Dept: ONCOLOGY | Facility: MEDICAL CENTER | Age: 64
End: 2019-09-07
Attending: INTERNAL MEDICINE
Payer: MEDICARE

## 2019-09-07 LAB
ALBUMIN SERPL BCP-MCNC: 2.1 G/DL (ref 3.2–4.9)
ALBUMIN/GLOB SERPL: 0.8 G/DL
ALP SERPL-CCNC: 108 U/L (ref 30–99)
ALT SERPL-CCNC: 11 U/L (ref 2–50)
ANION GAP SERPL CALC-SCNC: 6 MMOL/L (ref 0–11.9)
AST SERPL-CCNC: 20 U/L (ref 12–45)
BASOPHILS # BLD AUTO: 0.1 % (ref 0–1.8)
BASOPHILS # BLD: 0.01 K/UL (ref 0–0.12)
BILIRUB SERPL-MCNC: 1 MG/DL (ref 0.1–1.5)
BUN SERPL-MCNC: 15 MG/DL (ref 8–22)
CALCIUM SERPL-MCNC: 7.4 MG/DL (ref 8.5–10.5)
CHLORIDE SERPL-SCNC: 107 MMOL/L (ref 96–112)
CO2 SERPL-SCNC: 21 MMOL/L (ref 20–33)
CREAT SERPL-MCNC: 0.74 MG/DL (ref 0.5–1.4)
EOSINOPHIL # BLD AUTO: 0.05 K/UL (ref 0–0.51)
EOSINOPHIL NFR BLD: 0.6 % (ref 0–6.9)
ERYTHROCYTE [DISTWIDTH] IN BLOOD BY AUTOMATED COUNT: 47.2 FL (ref 35.9–50)
FERRITIN SERPL-MCNC: 755.7 NG/ML (ref 22–322)
GLOBULIN SER CALC-MCNC: 2.7 G/DL (ref 1.9–3.5)
GLUCOSE SERPL-MCNC: 119 MG/DL (ref 65–99)
HCT VFR BLD AUTO: 24.3 % (ref 42–52)
HGB BLD-MCNC: 7.6 G/DL (ref 14–18)
IMM GRANULOCYTES # BLD AUTO: 0.04 K/UL (ref 0–0.11)
IMM GRANULOCYTES NFR BLD AUTO: 0.5 % (ref 0–0.9)
IRON SATN MFR SERPL: 12 % (ref 15–55)
IRON SERPL-MCNC: 19 UG/DL (ref 50–180)
LYMPHOCYTES # BLD AUTO: 0.89 K/UL (ref 1–4.8)
LYMPHOCYTES NFR BLD: 10.7 % (ref 22–41)
MAGNESIUM SERPL-MCNC: 1.6 MG/DL (ref 1.5–2.5)
MCH RBC QN AUTO: 28 PG (ref 27–33)
MCHC RBC AUTO-ENTMCNC: 31.3 G/DL (ref 33.7–35.3)
MCV RBC AUTO: 89.7 FL (ref 81.4–97.8)
MONOCYTES # BLD AUTO: 0.6 K/UL (ref 0–0.85)
MONOCYTES NFR BLD AUTO: 7.2 % (ref 0–13.4)
NEUTROPHILS # BLD AUTO: 6.69 K/UL (ref 1.82–7.42)
NEUTROPHILS NFR BLD: 80.9 % (ref 44–72)
NRBC # BLD AUTO: 0 K/UL
NRBC BLD-RTO: 0 /100 WBC
PHOSPHATE SERPL-MCNC: 2.9 MG/DL (ref 2.5–4.5)
PLATELET # BLD AUTO: 268 K/UL (ref 164–446)
PMV BLD AUTO: 9.7 FL (ref 9–12.9)
POTASSIUM SERPL-SCNC: 3.9 MMOL/L (ref 3.6–5.5)
PROT SERPL-MCNC: 4.8 G/DL (ref 6–8.2)
RBC # BLD AUTO: 2.71 M/UL (ref 4.7–6.1)
SODIUM SERPL-SCNC: 134 MMOL/L (ref 135–145)
TIBC SERPL-MCNC: 158 UG/DL (ref 250–450)
WBC # BLD AUTO: 8.3 K/UL (ref 4.8–10.8)

## 2019-09-07 PROCEDURE — 85025 COMPLETE CBC W/AUTO DIFF WBC: CPT

## 2019-09-07 PROCEDURE — 700102 HCHG RX REV CODE 250 W/ 637 OVERRIDE(OP): Performed by: HOSPITALIST

## 2019-09-07 PROCEDURE — 80053 COMPREHEN METABOLIC PANEL: CPT

## 2019-09-07 PROCEDURE — 36415 COLL VENOUS BLD VENIPUNCTURE: CPT

## 2019-09-07 PROCEDURE — 84100 ASSAY OF PHOSPHORUS: CPT

## 2019-09-07 PROCEDURE — A9270 NON-COVERED ITEM OR SERVICE: HCPCS | Performed by: EMERGENCY MEDICINE

## 2019-09-07 PROCEDURE — 700105 HCHG RX REV CODE 258: Performed by: HOSPITALIST

## 2019-09-07 PROCEDURE — 700102 HCHG RX REV CODE 250 W/ 637 OVERRIDE(OP): Performed by: EMERGENCY MEDICINE

## 2019-09-07 PROCEDURE — 83550 IRON BINDING TEST: CPT

## 2019-09-07 PROCEDURE — 770020 HCHG ROOM/CARE - TELE (206)

## 2019-09-07 PROCEDURE — 700102 HCHG RX REV CODE 250 W/ 637 OVERRIDE(OP): Performed by: STUDENT IN AN ORGANIZED HEALTH CARE EDUCATION/TRAINING PROGRAM

## 2019-09-07 PROCEDURE — 83540 ASSAY OF IRON: CPT

## 2019-09-07 PROCEDURE — 700111 HCHG RX REV CODE 636 W/ 250 OVERRIDE (IP): Mod: JG | Performed by: HOSPITALIST

## 2019-09-07 PROCEDURE — 83735 ASSAY OF MAGNESIUM: CPT

## 2019-09-07 PROCEDURE — A9270 NON-COVERED ITEM OR SERVICE: HCPCS | Performed by: HOSPITALIST

## 2019-09-07 PROCEDURE — 99232 SBSQ HOSP IP/OBS MODERATE 35: CPT | Performed by: HOSPITALIST

## 2019-09-07 PROCEDURE — A9270 NON-COVERED ITEM OR SERVICE: HCPCS | Performed by: STUDENT IN AN ORGANIZED HEALTH CARE EDUCATION/TRAINING PROGRAM

## 2019-09-07 PROCEDURE — 82728 ASSAY OF FERRITIN: CPT

## 2019-09-07 RX ORDER — DIPHENHYDRAMINE HCL 25 MG
25 TABLET ORAL ONCE
Status: COMPLETED | OUTPATIENT
Start: 2019-09-07 | End: 2019-09-07

## 2019-09-07 RX ORDER — ACETAMINOPHEN 325 MG/1
650 TABLET ORAL ONCE
Status: COMPLETED | OUTPATIENT
Start: 2019-09-07 | End: 2019-09-07

## 2019-09-07 RX ORDER — DIPHENHYDRAMINE HYDROCHLORIDE 50 MG/ML
25 INJECTION INTRAMUSCULAR; INTRAVENOUS ONCE
Status: COMPLETED | OUTPATIENT
Start: 2019-09-07 | End: 2019-09-07

## 2019-09-07 RX ADMIN — ACETAMINOPHEN 650 MG: 325 TABLET ORAL at 12:43

## 2019-09-07 RX ADMIN — OMEPRAZOLE 20 MG: 20 CAPSULE, DELAYED RELEASE ORAL at 09:02

## 2019-09-07 RX ADMIN — LACOSAMIDE 200 MG: 100 TABLET, FILM COATED ORAL at 09:03

## 2019-09-07 RX ADMIN — OXYCODONE HYDROCHLORIDE 5 MG: 5 TABLET ORAL at 13:15

## 2019-09-07 RX ADMIN — SODIUM CHLORIDE 25 MG: 9 INJECTION, SOLUTION INTRAVENOUS at 12:58

## 2019-09-07 RX ADMIN — LEVETIRACETAM 2000 MG: 500 TABLET, FILM COATED ORAL at 09:03

## 2019-09-07 RX ADMIN — DIPHENHYDRAMINE HCL 25 MG: 25 TABLET ORAL at 12:43

## 2019-09-07 RX ADMIN — LACOSAMIDE 200 MG: 100 TABLET, FILM COATED ORAL at 17:24

## 2019-09-07 RX ADMIN — LEVETIRACETAM 2000 MG: 500 TABLET, FILM COATED ORAL at 17:24

## 2019-09-07 RX ADMIN — CHOLESTYRAMINE 4 G: 4 POWDER, FOR SUSPENSION ORAL at 04:18

## 2019-09-07 RX ADMIN — OMEPRAZOLE 20 MG: 20 CAPSULE, DELAYED RELEASE ORAL at 17:23

## 2019-09-07 RX ADMIN — SODIUM CHLORIDE 1775 MG: 9 INJECTION, SOLUTION INTRAVENOUS at 17:38

## 2019-09-07 ASSESSMENT — ENCOUNTER SYMPTOMS
CHILLS: 0
TREMORS: 0
WEAKNESS: 1
DIAPHORESIS: 0
POLYDIPSIA: 0
EYE PAIN: 0
FALLS: 0
PND: 0
HEARTBURN: 0
ABDOMINAL PAIN: 1
CLAUDICATION: 0
FEVER: 0
STRIDOR: 0
DIZZINESS: 1
ORTHOPNEA: 0
MYALGIAS: 0
DOUBLE VISION: 0
TINGLING: 0
BRUISES/BLEEDS EASILY: 0
PHOTOPHOBIA: 0
BLOOD IN STOOL: 1
VOMITING: 0
FLANK PAIN: 0
BACK PAIN: 0
WHEEZING: 0
SINUS PAIN: 0
NECK PAIN: 0
BLURRED VISION: 0
NAUSEA: 1
CONSTIPATION: 0
DIARRHEA: 0
SORE THROAT: 0
COUGH: 0
WEIGHT LOSS: 1
DEPRESSION: 0
SHORTNESS OF BREATH: 0
HEMOPTYSIS: 0
HEADACHES: 0
PALPITATIONS: 0
HALLUCINATIONS: 0
SPUTUM PRODUCTION: 0

## 2019-09-07 ASSESSMENT — LIFESTYLE VARIABLES: SUBSTANCE_ABUSE: 0

## 2019-09-07 NOTE — PROGRESS NOTES
Patient educated to use call light for assistance. Fall precautions in place. Staff will assist with mobilization. Hourly rounding in place.

## 2019-09-07 NOTE — PROGRESS NOTES
Handoff report received. Assumed patient care. Patient resting in bed.  Patient not in distress, AAOX 4. Safety precautions in place. Call light and personal belongings within reach. Educated to call for assistance if needed.

## 2019-09-07 NOTE — PROGRESS NOTES
1258:  Recent Iron lab results of 19. IV Iron replacement implemented. Gave Iron dextran IV test dose over 30 minutes.     1358:   No anaphylactic reaction occurred. Vital signs normal, no signs of hypotension, HTN, fever, and pt denies any chest & back pain. Pharmacist notified regarding full dose.     Educated pt regarding reason for IV Iron replacement (low Iron levels) and will continue to monitor.

## 2019-09-07 NOTE — CARE PLAN
Problem: Bowel/Gastric:  Goal: Normal bowel function is maintained or improved  Outcome: PROGRESSING AS EXPECTED  Note:   Patient is no longer experiencing diarrhea and returning to baseline.      Problem: Communication  Goal: The ability to communicate needs accurately and effectively will improve  Intervention: Reorient patient to environment as needed  Flowsheets (Taken 9/6/2019 2300)  Oriented to:: All of the Following : Location of Bathroom, Visiting Policy, Unit Routine, Call Light and Bedside Controls, Bedside Rail Policy, Smoking Policy, Rights and Responsibilities, Bedside Report, and Patient Education Notebook

## 2019-09-07 NOTE — CONSULTS
"Reason for PC Consult: Advance Care Planning    Consulted by: Dr. Glen Stephens; currently Dr. Urbano    Assessment:  General: 63 y/o male admitted 9/5 with recurrent episodes of hematochezia. PMHx of mesenteric stromal tumor (follows up with Dr. Dupont), nonischemic cardiomyopathy (EF 15% 8/8/19), mitral valve repair 7/2015, HTN, epilepsy, LBBB, and antiphospholipid syndrome maintained on anticoagulation with warfarin. GI felt he was too high risk for endoscopy. Plan was to initiate chemo with Dr. Dupont today in the infusion center. Mj reports having \"memory problems\" and wishes that everything be discussed with his sister Clau who is his    Social: Mj lives with his wife Gabi and son Kyle in [place]. He states that his wife is \"terminal\" and home on hospice with a friend looking after her, though he was unable to recall her name and knows that she came from [place]. Their son Kyle has a FT job and \"helps as much as he can.\" They have another son Jose M with a \"learning disability\" but he lives on his own and works.    Consults: GI    Dyspnea: No-    Last BM: 09/06/19-    Pain: No-    Depression: Mood appropriate for situation-    Dementia: No;       Spiritual:  Is Jehovah's witness or spirituality important for coping with this illness? Yes, but he denied any desire for a visit from  today    Has a  or spiritual provider visit been requested? No    Palliative Performance Scale: 40%    Advance Directive: Advance Directive-    DPOA: Yes- wife, Gabi [phone]; alternate is sister Clau [phone]  POLST: No-      Code Status: DNR- confirmed      Outcome:  PC RN met with Mj at bedside and explained the role of PC. He provided his social and medical history, including his wife's situation and understanding of his current situation. He reports feeling \"fuzzy\" and while the conversation was appropriate, he recognizes that he just \"can't remember things.\" He states that the plan was to initiate treatment " "today with Dr. uDpont on the advisement of the [place] team. He understands that he is not going to receive treatment today given the acute issue and understands the rationale. He denied any questions about the things that have happened/reason for admission but does wonder about the plan with oncology    Explored pt's values, beliefs, and preferences in order to identify GOC. Short term, he wishes to \"get out of the hospital\" and long term, he hopes to \"shrink this tumor.\" He understands the information he's received about his heart function and the significance of the change from his ECHO last year. PC RN offered to reach out to oncology to discuss his case and also Clau to provide updates and see if she has any questions. Mj denied any needs/questions at this time.    While talking to Mj, PC RN provided therapeutic communication, including open ended questions, reflective listening, and normalization of thought and feelings throughout encounter, as well as reinforced his goals of care. PC contact information provided to Mj and encouraged to call with any questions or concerns. Case discussed with Dr. Dupont who will be by to see the patient Sunday AM. Any treatment is pending resolution of his current acute issues, like the GIB.The decreased EF is less concerning for the regime that is planned for him. PC RN will place a call to Clau to provide updates and alert her of the plan for oncology to visit tomorrow should she wish to be present.     Updated: MD/RN    Plan: TBD based on further discussion with oncology and Mj/Clau    Thank you for allowing Palliative Care to participate in this patient's care. Please feel free to call [phone] with any questions or concerns.  "

## 2019-09-07 NOTE — PROGRESS NOTES
3 nurses attempted to put a PIV.  No trained ultrasound nurses currently on floor.  Flex nurse Called T8 to see if another nurse was available.

## 2019-09-07 NOTE — PROGRESS NOTES
IV Iron Per Pharmacy Note  Patient Lean Body Weight:  63.8  Reason for Iron Replacement: acute blood loss anemia    Lab Results   Component Value Date/Time    WBC 8.3 09/07/2019 03:20 AM    RBC 2.71 (L) 09/07/2019 03:20 AM    HEMOGLOBIN 7.6 (L) 09/07/2019 03:20 AM    HEMATOCRIT 24.3 (L) 09/07/2019 03:20 AM    MCV 89.7 09/07/2019 03:20 AM    MCH 28.0 09/07/2019 03:20 AM    MCHC 31.3 (L) 09/07/2019 03:20 AM    MPV 9.7 09/07/2019 03:20 AM     Recent Labs     09/07/19  0320   FERRITIN 755.7*   IRON 19*      Recent Labs     09/07/19  0320   CREATININE 0.74     Estimated Creatinine Clearance: 89.8 mL/min (by C-G formula based on SCr of 0.74 mg/dL).    Assessment/Plan:  1. IV Iron Indicated.   2. Give Iron Dextran 25 mg IV test dose following diphenhydramine/acetaminophen premeds over 30 minutes per protocol.  3. If no reaction (Anaphylaxis, Hypotension/Hypertension, N/V/D, Chest pain/Back Pain, Urticaria/Pruritis) in the next hour, proceed to full dose. Nursing to call the pharmacy IV room at [phone]. [phone] for full dose.  4. Full dose: Iron Dextran 1775 mg IV over 4 hours. Continue to monitor for delayed ADR including: Arthralgia/myalgia, Headache/backache, chills/dizziness/malaise, moderate to high fever and n/v.      Enzo Gan, PharmD

## 2019-09-07 NOTE — PALLIATIVE CARE
"Palliative Care follow-up  Call placed to sister/alternate DPOA Clau  [phone] and explained the role of this RN. PC RN provided updates on the visit with Mj today and discussion with oncology. She states the plan was for him to initiate chemo Tuesday but understands that he must be as good as he can be to receive treatment. She states that \"I laid it all on the line for him and tried to get him to understand that he can die from all of this.\" She states that he has \"a form of dementia\" and often forgets things. PC RN alerted her of the plan for oncology to visit tomorrow (Sunday) AM around 0800 and she states she too will be present for that meeting. She recognizes his goal to be able to shrink the tumor but also realistic about quality vs quantity of life for him given his overall medical history. Clau denied any questions and plans to be present before 0800 to meet with Dr. Dupont.       Updated: BS team/ TT to Dr. Dupont    Plan: follow and support; Clau hopeful for DC after meeting with Dr. Dupont    Thank you for allowing Palliative Care to participate in this patient's care. Please feel free to call [phone] with any questions or concerns.  "

## 2019-09-07 NOTE — CARE PLAN
Problem: Knowledge Deficit  Goal: Knowledge of disease process/condition, treatment plan, diagnostic tests, and medications will improve  Outcome: PROGRESSING AS EXPECTED  Pt educated on each medication with medication passes. Pt verbalize understanding.      Problem: Communication  Goal: The ability to communicate needs accurately and effectively will improve  Outcome: PROGRESSING AS EXPECTED  Note:   Educate patient and significant other/support system about the plan of care, procedures, treatments, medications and allow for questions. Updates and concerns are communicated with patient and patient's sister.

## 2019-09-08 VITALS
RESPIRATION RATE: 16 BRPM | SYSTOLIC BLOOD PRESSURE: 105 MMHG | TEMPERATURE: 97.1 F | OXYGEN SATURATION: 90 % | HEIGHT: 66 IN | WEIGHT: 173.06 LBS | HEART RATE: 112 BPM | BODY MASS INDEX: 27.81 KG/M2 | DIASTOLIC BLOOD PRESSURE: 67 MMHG

## 2019-09-08 LAB
ALBUMIN SERPL BCP-MCNC: 2 G/DL (ref 3.2–4.9)
ALBUMIN/GLOB SERPL: 0.7 G/DL
ALP SERPL-CCNC: 119 U/L (ref 30–99)
ALT SERPL-CCNC: 10 U/L (ref 2–50)
ANION GAP SERPL CALC-SCNC: 9 MMOL/L (ref 0–11.9)
AST SERPL-CCNC: 21 U/L (ref 12–45)
BASOPHILS # BLD AUTO: 0.1 % (ref 0–1.8)
BASOPHILS # BLD: 0.01 K/UL (ref 0–0.12)
BILIRUB SERPL-MCNC: 1.2 MG/DL (ref 0.1–1.5)
BUN SERPL-MCNC: 14 MG/DL (ref 8–22)
CALCIUM SERPL-MCNC: 7.6 MG/DL (ref 8.5–10.5)
CHLORIDE SERPL-SCNC: 109 MMOL/L (ref 96–112)
CO2 SERPL-SCNC: 19 MMOL/L (ref 20–33)
CREAT SERPL-MCNC: 0.77 MG/DL (ref 0.5–1.4)
EOSINOPHIL # BLD AUTO: 0.06 K/UL (ref 0–0.51)
EOSINOPHIL NFR BLD: 0.8 % (ref 0–6.9)
ERYTHROCYTE [DISTWIDTH] IN BLOOD BY AUTOMATED COUNT: 47.1 FL (ref 35.9–50)
GLOBULIN SER CALC-MCNC: 2.7 G/DL (ref 1.9–3.5)
GLUCOSE SERPL-MCNC: 100 MG/DL (ref 65–99)
HCT VFR BLD AUTO: 24.1 % (ref 42–52)
HGB BLD-MCNC: 7.6 G/DL (ref 14–18)
IMM GRANULOCYTES # BLD AUTO: 0.03 K/UL (ref 0–0.11)
IMM GRANULOCYTES NFR BLD AUTO: 0.4 % (ref 0–0.9)
LYMPHOCYTES # BLD AUTO: 0.96 K/UL (ref 1–4.8)
LYMPHOCYTES NFR BLD: 12 % (ref 22–41)
MCH RBC QN AUTO: 28.3 PG (ref 27–33)
MCHC RBC AUTO-ENTMCNC: 31.5 G/DL (ref 33.7–35.3)
MCV RBC AUTO: 89.6 FL (ref 81.4–97.8)
MONOCYTES # BLD AUTO: 0.64 K/UL (ref 0–0.85)
MONOCYTES NFR BLD AUTO: 8 % (ref 0–13.4)
NEUTROPHILS # BLD AUTO: 6.29 K/UL (ref 1.82–7.42)
NEUTROPHILS NFR BLD: 78.7 % (ref 44–72)
NRBC # BLD AUTO: 0 K/UL
NRBC BLD-RTO: 0 /100 WBC
PLATELET # BLD AUTO: 239 K/UL (ref 164–446)
PMV BLD AUTO: 10.1 FL (ref 9–12.9)
POTASSIUM SERPL-SCNC: 4 MMOL/L (ref 3.6–5.5)
PROT SERPL-MCNC: 4.7 G/DL (ref 6–8.2)
RBC # BLD AUTO: 2.69 M/UL (ref 4.7–6.1)
SODIUM SERPL-SCNC: 137 MMOL/L (ref 135–145)
WBC # BLD AUTO: 8 K/UL (ref 4.8–10.8)

## 2019-09-08 PROCEDURE — A9270 NON-COVERED ITEM OR SERVICE: HCPCS | Performed by: HOSPITALIST

## 2019-09-08 PROCEDURE — 80053 COMPREHEN METABOLIC PANEL: CPT

## 2019-09-08 PROCEDURE — 700102 HCHG RX REV CODE 250 W/ 637 OVERRIDE(OP): Performed by: EMERGENCY MEDICINE

## 2019-09-08 PROCEDURE — A9270 NON-COVERED ITEM OR SERVICE: HCPCS | Performed by: STUDENT IN AN ORGANIZED HEALTH CARE EDUCATION/TRAINING PROGRAM

## 2019-09-08 PROCEDURE — 99239 HOSP IP/OBS DSCHRG MGMT >30: CPT | Performed by: HOSPITALIST

## 2019-09-08 PROCEDURE — A9270 NON-COVERED ITEM OR SERVICE: HCPCS | Performed by: EMERGENCY MEDICINE

## 2019-09-08 PROCEDURE — 700102 HCHG RX REV CODE 250 W/ 637 OVERRIDE(OP): Performed by: HOSPITALIST

## 2019-09-08 PROCEDURE — 36415 COLL VENOUS BLD VENIPUNCTURE: CPT

## 2019-09-08 PROCEDURE — 85025 COMPLETE CBC W/AUTO DIFF WBC: CPT

## 2019-09-08 PROCEDURE — 700102 HCHG RX REV CODE 250 W/ 637 OVERRIDE(OP): Performed by: STUDENT IN AN ORGANIZED HEALTH CARE EDUCATION/TRAINING PROGRAM

## 2019-09-08 RX ORDER — MORPHINE SULFATE 10 MG/ML
5 INJECTION, SOLUTION INTRAMUSCULAR; INTRAVENOUS
Status: DISCONTINUED | OUTPATIENT
Start: 2019-09-08 | End: 2019-09-08

## 2019-09-08 RX ORDER — ONDANSETRON 4 MG/1
8 TABLET, ORALLY DISINTEGRATING ORAL EVERY 8 HOURS PRN
Status: DISCONTINUED | OUTPATIENT
Start: 2019-09-08 | End: 2019-09-08 | Stop reason: HOSPADM

## 2019-09-08 RX ORDER — ACETAMINOPHEN 325 MG/1
650 TABLET ORAL EVERY 4 HOURS PRN
Status: DISCONTINUED | OUTPATIENT
Start: 2019-09-08 | End: 2019-09-08

## 2019-09-08 RX ORDER — ACETAMINOPHEN 650 MG/1
650 SUPPOSITORY RECTAL EVERY 4 HOURS PRN
Status: DISCONTINUED | OUTPATIENT
Start: 2019-09-08 | End: 2019-09-08

## 2019-09-08 RX ORDER — LORAZEPAM 2 MG/ML
1 CONCENTRATE ORAL
Status: DISCONTINUED | OUTPATIENT
Start: 2019-09-08 | End: 2019-09-08 | Stop reason: HOSPADM

## 2019-09-08 RX ORDER — LORAZEPAM 2 MG/ML
1 INJECTION INTRAMUSCULAR
Status: DISCONTINUED | OUTPATIENT
Start: 2019-09-08 | End: 2019-09-08 | Stop reason: HOSPADM

## 2019-09-08 RX ORDER — LORAZEPAM 2 MG/ML
1 CONCENTRATE ORAL
Status: DISCONTINUED | OUTPATIENT
Start: 2019-09-08 | End: 2019-09-08

## 2019-09-08 RX ORDER — SCOLOPAMINE TRANSDERMAL SYSTEM 1 MG/1
1 PATCH, EXTENDED RELEASE TRANSDERMAL
Status: DISCONTINUED | OUTPATIENT
Start: 2019-09-08 | End: 2019-09-08 | Stop reason: HOSPADM

## 2019-09-08 RX ORDER — ATROPINE SULFATE 10 MG/ML
2 SOLUTION/ DROPS OPHTHALMIC EVERY 4 HOURS PRN
Status: DISCONTINUED | OUTPATIENT
Start: 2019-09-08 | End: 2019-09-08

## 2019-09-08 RX ORDER — HYDROMORPHONE HYDROCHLORIDE 2 MG/ML
2 INJECTION, SOLUTION INTRAMUSCULAR; INTRAVENOUS; SUBCUTANEOUS
Status: DISCONTINUED | OUTPATIENT
Start: 2019-09-08 | End: 2019-09-08 | Stop reason: HOSPADM

## 2019-09-08 RX ORDER — POLYVINYL ALCOHOL 14 MG/ML
2 SOLUTION/ DROPS OPHTHALMIC EVERY 6 HOURS PRN
Status: DISCONTINUED | OUTPATIENT
Start: 2019-09-08 | End: 2019-09-08

## 2019-09-08 RX ORDER — ATROPINE SULFATE 10 MG/ML
2 SOLUTION/ DROPS OPHTHALMIC EVERY 4 HOURS PRN
Status: DISCONTINUED | OUTPATIENT
Start: 2019-09-08 | End: 2019-09-08 | Stop reason: HOSPADM

## 2019-09-08 RX ORDER — DOCUSATE SODIUM 100 MG/1
100 CAPSULE, LIQUID FILLED ORAL EVERY 12 HOURS
Status: DISCONTINUED | OUTPATIENT
Start: 2019-09-08 | End: 2019-09-08 | Stop reason: HOSPADM

## 2019-09-08 RX ORDER — ONDANSETRON 2 MG/ML
8 INJECTION INTRAMUSCULAR; INTRAVENOUS EVERY 8 HOURS PRN
Status: DISCONTINUED | OUTPATIENT
Start: 2019-09-08 | End: 2019-09-08 | Stop reason: HOSPADM

## 2019-09-08 RX ORDER — LORAZEPAM 2 MG/ML
1 INJECTION INTRAMUSCULAR
Status: DISCONTINUED | OUTPATIENT
Start: 2019-09-08 | End: 2019-09-08

## 2019-09-08 RX ORDER — ACETAMINOPHEN 325 MG/1
650 TABLET ORAL EVERY 4 HOURS PRN
Status: DISCONTINUED | OUTPATIENT
Start: 2019-09-08 | End: 2019-09-08 | Stop reason: HOSPADM

## 2019-09-08 RX ORDER — SCOLOPAMINE TRANSDERMAL SYSTEM 1 MG/1
1 PATCH, EXTENDED RELEASE TRANSDERMAL
Status: DISCONTINUED | OUTPATIENT
Start: 2019-09-08 | End: 2019-09-08

## 2019-09-08 RX ORDER — ACETAMINOPHEN 650 MG/1
650 SUPPOSITORY RECTAL EVERY 4 HOURS PRN
Status: DISCONTINUED | OUTPATIENT
Start: 2019-09-08 | End: 2019-09-08 | Stop reason: HOSPADM

## 2019-09-08 RX ADMIN — LEVETIRACETAM 2000 MG: 500 TABLET, FILM COATED ORAL at 05:22

## 2019-09-08 RX ADMIN — LACOSAMIDE 200 MG: 100 TABLET, FILM COATED ORAL at 05:22

## 2019-09-08 RX ADMIN — OMEPRAZOLE 20 MG: 20 CAPSULE, DELAYED RELEASE ORAL at 05:22

## 2019-09-08 RX ADMIN — LORAZEPAM 1 MG: 2 SOLUTION, CONCENTRATE ORAL at 15:22

## 2019-09-08 NOTE — PROGRESS NOTES
Palliative Care   Received call from Clau inquiring about discharge today and setting up hospice over the next few days.  Spoke with Mary at [facility] who reports their admissions coordinator has been in contact with patient's sister.  Spoke with Clau who confirmed she was meeting with hospice and they would prefer form the patient to go home today as he does not need much in the way of medication or equipment.  Discussed with Dr. Urbano and RN.  Requested RN send POLST home with patient.  Faxed hospice referral to [facility] at [phone].    JOSE Angel.  Palliative Care Nurse Practitioner  [phone]

## 2019-09-08 NOTE — DISCHARGE INSTRUCTIONS
Discharge Instructions    Discharged to home by car with relative. Discharged via wheelchair, hospital escort: Yes.  Special equipment needed: Not Applicable    Be sure to schedule a follow-up appointment with your primary care doctor or any specialists as instructed.     Discharge Plan:   Influenza Vaccine Indication: Indicated: Not available from distributor/    I understand that a diet low in cholesterol, fat, and sodium is recommended for good health. Unless I have been given specific instructions below for another diet, I accept this instruction as my diet prescription.   Other diet:     Special Instructions: None    · Is patient discharged on Warfarin / Coumadin?   No     Depression / Suicide Risk    As you are discharged from this [facility] facility, it is important to learn how to keep safe from harming yourself.    Recognize the warning signs:  · Abrupt changes in personality, positive or negative- including increase in energy   · Giving away possessions  · Change in eating patterns- significant weight changes-  positive or negative  · Change in sleeping patterns- unable to sleep or sleeping all the time   · Unwillingness or inability to communicate  · Depression  · Unusual sadness, discouragement and loneliness  · Talk of wanting to die  · Neglect of personal appearance   · Rebelliousness- reckless behavior  · Withdrawal from people/activities they love  · Confusion- inability to concentrate     If you or a loved one observes any of these behaviors or has concerns about self-harm, here's what you can do:  · Talk about it- your feelings and reasons for harming yourself  · Remove any means that you might use to hurt yourself (examples: pills, rope, extension cords, firearm)  · Get professional help from the community (Mental Health, Substance Abuse, psychological counseling)  · Do not be alone:Call your Safe Contact- someone whom you trust who will be there for you.  · Call your local CRISIS  HOTLINE 094-0411 or 425-485-1597  · Call your local Children's Mobile Crisis Response Team [place] (861) 905-4721 or www.Funding Options  · Call the toll free National Suicide Prevention Hotlines   · National Suicide Prevention Lifeline 500-182-QNQM (0607)  · National [employer] Network 800-SUICIDE (879-0705)

## 2019-09-08 NOTE — PALLIATIVE CARE
Received phone call from Clau regarding discharging pt today. She says pt's bed and meds are already in place.    Updated: Mariya REAGAN    Phone number: Clau [phone]  Thank you for allowing Palliative Care to participate in this patient's care. [id number]

## 2019-09-08 NOTE — PALLIATIVE CARE
Palliative Care   Received call from patient's Sister Clau who reports that patient's wife is on service with [facility] and they would like a referral sent to [facility].  Discussed with patient who requested I obtain a verbal from him and his sister as he is unable to sign his name due to shakiness.  Choice form completed for [facility] and sent to [facility].  Updated unit CM Pat.  Choice form left and T7 MR box.  Call placed to [facility] to notify them of referral and background.     Rae Stanley A.P.R.N.  Palliative Care Nurse Practitioner  [phone]

## 2019-09-08 NOTE — PROGRESS NOTES
Hospital Medicine Daily Progress Note    Date of Service  9/7/2019    Chief Complaint  64 y.o. 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 Dupont, and history of nonischemic cardiomyopathy last EF 15% as well as mitral valve repair and antiphospholipid syndrome has been maintained on anticoagulation with warfarin presented with recurrent episodes of hematochezia.    Hospital Course    Upon arrival to the emergency room his blood pressure was 120/63, pulse 98.  WBC was 7.5, hemoglobin 7.2.  He was started on IV fluids and given 1 unit of transfusion.      Interval Problem Update  9/6: GI was consulted and patient determined that he is too high risk for endoscopy procedure.  Patient's hemodynamics and hemoglobin have been stable.  We will continue to monitor his vitals and hemoglobin.  Likely restart warfarin tomorrow if no evidence of bleeding.  9/7: Patient was seen in bed by me today.  He states he has no bloody stools and his hemoglobin is been intact.  Oncology to evaluate the patient tomorrow.  Consultants/Specialty  Gastroenterology    Code Status  DNR/DNI    Disposition  TBD    Review of Systems  Review of Systems   Constitutional: Positive for malaise/fatigue and weight loss. Negative for chills, diaphoresis and fever.   HENT: Negative for congestion, ear discharge, ear pain, hearing loss, nosebleeds, sinus pain, sore throat and tinnitus.    Eyes: Negative for blurred vision, double vision, photophobia and pain.   Respiratory: Negative for cough, hemoptysis, sputum production, shortness of breath, wheezing and stridor.    Cardiovascular: Negative for chest pain, palpitations, orthopnea, claudication, leg swelling and PND.   Gastrointestinal: Positive for abdominal pain, blood in stool and nausea. Negative for constipation, diarrhea, heartburn, melena and vomiting.   Genitourinary: Negative for dysuria, flank pain, frequency, hematuria and urgency.    Musculoskeletal: Negative for back pain, falls, joint pain, myalgias and neck pain.   Skin: Negative for itching and rash.   Neurological: Positive for dizziness and weakness. Negative for tingling, tremors and headaches.   Endo/Heme/Allergies: Negative for environmental allergies and polydipsia. Does not bruise/bleed easily.   Psychiatric/Behavioral: Negative for depression, hallucinations, substance abuse and suicidal ideas.        Physical Exam  Temp:  [36.2 °C (97.1 °F)-36.6 °C (97.9 °F)] 36.4 °C (97.6 °F)  Pulse:  [105-117] 112  Resp:  [15-16] 15  BP: (103-127)/(53-73) 114/62  SpO2:  [81 %-96 %] 92 %    Physical Exam   Constitutional: He is oriented to person, place, and time. He appears well-developed and well-nourished.   HENT:   Head: Normocephalic and atraumatic.   Mouth/Throat: No oropharyngeal exudate.   Eyes: Conjunctivae are normal. No scleral icterus.   Neck: Normal range of motion. Neck supple. No JVD present. No tracheal deviation present. No thyromegaly present.   Cardiovascular: Normal rate, regular rhythm and intact distal pulses. Exam reveals no gallop and no friction rub.   No murmur heard.  Pulmonary/Chest: Effort normal and breath sounds normal. No stridor. No respiratory distress. He has no wheezes. He has no rales. He exhibits no tenderness.   Abdominal: Soft. Bowel sounds are normal. He exhibits distension and mass. There is tenderness. There is no rebound and no guarding.   Musculoskeletal: Normal range of motion. He exhibits no edema.   Lymphadenopathy:     He has no cervical adenopathy.   Neurological: He is alert and oriented to person, place, and time. He has normal reflexes.   Nursing note and vitals reviewed.      Fluids  No intake or output data in the 24 hours ending 09/07/19 1801    Laboratory  Recent Labs     09/05/19  0920  09/06/19  0336 09/06/19  1140 09/06/19  1930 09/07/19  0320   WBC 7.5  --  7.5  --   --  8.3   RBC 2.59*  --  2.79*  --   --  2.71*   HEMOGLOBIN 7.2*   < >  7.7* 8.2* 8.3* 7.6*   HEMATOCRIT 23.3*  --  25.3*  --   --  24.3*   MCV 90.0  --  90.7  --   --  89.7   MCH 27.8  --  27.6  --   --  28.0   MCHC 30.9*  --  30.4*  --   --  31.3*   RDW 47.1  --  47.8  --   --  47.2   PLATELETCT 342  --  318  --   --  268   MPV 9.9  --  9.7  --   --  9.7    < > = values in this interval not displayed.     Recent Labs     09/05/19 0920 09/06/19 0336 09/07/19  0320   SODIUM 137 137 134*   POTASSIUM 4.1 4.1 3.9   CHLORIDE 106 108 107   CO2 21 21 21   GLUCOSE 92 104* 119*   BUN 23* 21 15   CREATININE 0.99 0.86 0.74   CALCIUM 8.1* 7.6* 7.4*     Recent Labs     09/05/19 0920 09/06/19 0336   APTT 58.1*  --    INR 1.81* 1.82*               Imaging  CTA ABDOMEN PELVIS W & W/O POST PROCESS   Final Result      1.  Interval increase in size of large heterogeneous mesenteric mass since prior exam.   2.  Minimal ascites again noted.   3.  Fatty infiltration of liver.   4.  Bilateral lower lobe infiltrates suggesting pneumonia, with small RIGHT pleural effusion.  Pneumonia appears somewhat improved from prior exam.           Assessment/Plan  * GI bleed  Assessment & Plan  Likely lower GI but with history of dark stool cannot rule out more proximal bleed  Patient will be admitted and closely monitored on telemetry  We will start him on IV fluids for hydration  He is receiving 1 unit of packed RBC and will monitor his hemoglobin and transfuse if less than 7  We will hold anticoagulation his INR is currently subtherapeutic  Switch to oral Protonix per GI  GI recommended no invasive procedures since he is high risk  I reviewed his last CT of abdomen and pelvis from August 2019 revealing increase of his mesenteric tumor but no bowel obstruction or thickening     GI cleared patient for warfarin but I will monitor his hemoglobin for 1 day before starting it.    Antiphospholipid syndrome (HCC)- (present on admission)  Assessment & Plan  Hold warfarin at this time given GI bleed    Seizure (HCC)-  (present on admission)  Assessment & Plan  Continue Vimpat and Keppra    Elevated troponin  Assessment & Plan  Likely demand ischemia in setting of GI bleed and hypotension      Hypotension due to hypovolemia  Assessment & Plan  Responded to IV fluids and transfusion    Monitor fluid intake and output given history of cardiomyopathy    Diarrhea  Assessment & Plan  Patient denies any recent antibiotic use no leukocytosis  Has had diarrhea on and off for several month per patient  C. difficile is negative  Other stool studies are pending  GI added cholestyramine    Anemia associated with acute blood loss  Assessment & Plan  Monitor hemoglobin and transfuse if less than 8    Gastrointestinal stromal tumor (GIST) (HCC)- (present on admission)  Assessment & Plan  With an enlarging tumor on his last CT  Follows up with Dr. Dupont and has also been evaluated at [place] and reports that he was scheduled to start and you chemotherapy infusion the Saturday with gemcitabine and docetaxel    S/P mitral valve repair- (present on admission)  Assessment & Plan  We will hold anticoagulation given anemia and GI bleed    Chronic systolic congestive heart failure, NYHA class 2 (HCC)- (present on admission)  Assessment & Plan  Given hypotension on presentation we will hold carvedilol lisinopril and diuretics  Monitor fluid status         VTE prophylaxis: holding warfarin

## 2019-09-08 NOTE — PROGRESS NOTES
Hospital Medicine Daily Progress Note    Date of Service  9/7/2019    Chief Complaint  64 y.o. 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 Dupont, and history of nonischemic cardiomyopathy last EF 15% as well as mitral valve repair and antiphospholipid syndrome has been maintained on anticoagulation with warfarin presented with recurrent episodes of hematochezia.    Hospital Course    Upon arrival to the emergency room his blood pressure was 120/63, pulse 98.  WBC was 7.5, hemoglobin 7.2.  He was started on IV fluids and given 1 unit of transfusion.      Interval Problem Update  9/6: GI was consulted and patient determined that he is too high risk for endoscopy procedure.  Patient's hemodynamics and hemoglobin have been stable.  We will continue to monitor his vitals and hemoglobin.  Likely restart warfarin tomorrow if no evidence of bleeding.  9/7: Patient was seen in bed by me today.  He states he has no bloody stools and his hemoglobin is been intact.  Oncology to evaluate the patient tomorrow.  I have started the patient on IV iron.  Consultants/Specialty  Gastroenterology    Code Status  DNR/DNI    Disposition  TBD    Review of Systems  Review of Systems   Constitutional: Positive for malaise/fatigue and weight loss. Negative for chills, diaphoresis and fever.   HENT: Negative for congestion, ear discharge, ear pain, hearing loss, nosebleeds, sinus pain, sore throat and tinnitus.    Eyes: Negative for blurred vision, double vision, photophobia and pain.   Respiratory: Negative for cough, hemoptysis, sputum production, shortness of breath, wheezing and stridor.    Cardiovascular: Negative for chest pain, palpitations, orthopnea, claudication, leg swelling and PND.   Gastrointestinal: Positive for abdominal pain, blood in stool and nausea. Negative for constipation, diarrhea, heartburn, melena and vomiting.   Genitourinary: Negative for dysuria, flank pain,  frequency, hematuria and urgency.   Musculoskeletal: Negative for back pain, falls, joint pain, myalgias and neck pain.   Skin: Negative for itching and rash.   Neurological: Positive for dizziness and weakness. Negative for tingling, tremors and headaches.   Endo/Heme/Allergies: Negative for environmental allergies and polydipsia. Does not bruise/bleed easily.   Psychiatric/Behavioral: Negative for depression, hallucinations, substance abuse and suicidal ideas.        Physical Exam  Temp:  [36.2 °C (97.1 °F)-36.6 °C (97.9 °F)] 36.4 °C (97.6 °F)  Pulse:  [105-117] 112  Resp:  [15-16] 15  BP: (103-127)/(53-73) 114/62  SpO2:  [81 %-96 %] 92 %    Physical Exam   Constitutional: He is oriented to person, place, and time. He appears well-developed and well-nourished.   HENT:   Head: Normocephalic and atraumatic.   Mouth/Throat: No oropharyngeal exudate.   Eyes: Conjunctivae are normal. No scleral icterus.   Neck: Normal range of motion. Neck supple. No JVD present. No tracheal deviation present. No thyromegaly present.   Cardiovascular: Normal rate, regular rhythm and intact distal pulses. Exam reveals no gallop and no friction rub.   No murmur heard.  Pulmonary/Chest: Effort normal and breath sounds normal. No stridor. No respiratory distress. He has no wheezes. He has no rales. He exhibits no tenderness.   Abdominal: Soft. Bowel sounds are normal. He exhibits distension and mass. There is tenderness. There is no rebound and no guarding.   Musculoskeletal: Normal range of motion. He exhibits no edema.   Lymphadenopathy:     He has no cervical adenopathy.   Neurological: He is alert and oriented to person, place, and time. He has normal reflexes.   Nursing note and vitals reviewed.      Fluids  No intake or output data in the 24 hours ending 09/07/19 1802    Laboratory  Recent Labs     09/05/19  0920  09/06/19  0336 09/06/19  1140 09/06/19  1930 09/07/19  0320   WBC 7.5  --  7.5  --   --  8.3   RBC 2.59*  --  2.79*  --    --  2.71*   HEMOGLOBIN 7.2*   < > 7.7* 8.2* 8.3* 7.6*   HEMATOCRIT 23.3*  --  25.3*  --   --  24.3*   MCV 90.0  --  90.7  --   --  89.7   MCH 27.8  --  27.6  --   --  28.0   MCHC 30.9*  --  30.4*  --   --  31.3*   RDW 47.1  --  47.8  --   --  47.2   PLATELETCT 342  --  318  --   --  268   MPV 9.9  --  9.7  --   --  9.7    < > = values in this interval not displayed.     Recent Labs     09/05/19 0920 09/06/19 0336 09/07/19  0320   SODIUM 137 137 134*   POTASSIUM 4.1 4.1 3.9   CHLORIDE 106 108 107   CO2 21 21 21   GLUCOSE 92 104* 119*   BUN 23* 21 15   CREATININE 0.99 0.86 0.74   CALCIUM 8.1* 7.6* 7.4*     Recent Labs     09/05/19 0920 09/06/19 0336   APTT 58.1*  --    INR 1.81* 1.82*               Imaging  CTA ABDOMEN PELVIS W & W/O POST PROCESS   Final Result      1.  Interval increase in size of large heterogeneous mesenteric mass since prior exam.   2.  Minimal ascites again noted.   3.  Fatty infiltration of liver.   4.  Bilateral lower lobe infiltrates suggesting pneumonia, with small RIGHT pleural effusion.  Pneumonia appears somewhat improved from prior exam.           Assessment/Plan  * GI bleed  Assessment & Plan  Likely lower GI but with history of dark stool cannot rule out more proximal bleed  Patient will be admitted and closely monitored on telemetry  We will start him on IV fluids for hydration  He is receiving 1 unit of packed RBC and will monitor his hemoglobin and transfuse if less than 7  We will hold anticoagulation his INR is currently subtherapeutic  Switch to oral Protonix per GI  GI recommended no invasive procedures since he is high risk  I reviewed his last CT of abdomen and pelvis from August 2019 revealing increase of his mesenteric tumor but no bowel obstruction or thickening     GI cleared patient for warfarin but I will monitor his hemoglobin for 1 day before starting it.    Antiphospholipid syndrome (HCC)- (present on admission)  Assessment & Plan  Hold warfarin at this time given  GI bleed    Seizure (HCC)- (present on admission)  Assessment & Plan  Continue Vimpat and Keppra    Elevated troponin  Assessment & Plan  Likely demand ischemia in setting of GI bleed and hypotension      Hypotension due to hypovolemia  Assessment & Plan  Responded to IV fluids and transfusion    Monitor fluid intake and output given history of cardiomyopathy    Diarrhea  Assessment & Plan  Patient denies any recent antibiotic use no leukocytosis  Has had diarrhea on and off for several month per patient  C. difficile is negative  Other stool studies are pending  GI added cholestyramine    Anemia associated with acute blood loss  Assessment & Plan  Monitor hemoglobin and transfuse if less than 8    Gastrointestinal stromal tumor (GIST) (HCC)- (present on admission)  Assessment & Plan  With an enlarging tumor on his last CT  Follows up with Dr. Dupont and has also been evaluated at [place] and reports that he was scheduled to start and you chemotherapy infusion the Saturday with gemcitabine and docetaxel    S/P mitral valve repair- (present on admission)  Assessment & Plan  We will hold anticoagulation given anemia and GI bleed    Chronic systolic congestive heart failure, NYHA class 2 (HCC)- (present on admission)  Assessment & Plan  Given hypotension on presentation we will hold carvedilol lisinopril and diuretics  Monitor fluid status         VTE prophylaxis: holding warfarin

## 2019-09-08 NOTE — PROGRESS NOTES
"Palliative Care Advance Care Planning Progress Note    General: Roque Molina is a 64-year-old male admitted 9/5/2019 with GI bleed.  Patient has dedifferentiated liposarcoma, congestive heart failure with EF of 15%, and anemia.  He was originally seen by palliative care on 9/7/2019.    Discussion: Present for oncology visit this morning with Dr. Dupont, patient, and patient's Sister Clau who is his first alternate POA HC (patient's wife Gabi is primary POA HC however she is terminally ill at home on hospice and thus the patient is deferring decision-making to Clau).  Dr. Dupont discussed the patient's multiple medical issues and poor functional status with an ECOG of 3-4.  It is felt that chemotherapy would cause more toxicities than benefit.    Given this information patient expressed he would like to go home and spend time with his family.  Patient reports that his wife is currently on hospice and being taken care of by a friend.  Patient's Sister Clau stated \"they will have to nurses with me.\"  Clau reports she works and lives in [place] but is able to take time off to help care for her brother.  The patient also has two adult sons who are on the autistic spectrum.  One lives in a group home and the other is higher functioning and holds a job but lives with the patient and his spouse.  The patient's sons are aware of what is going on in both have community social workers involved.  The patient would like to be on service with the same hospice agency as his wife but cannot recall the name.  Patient's Sister Clau will find out today and let me know.      POLST form completed with the patient and his sister for DNR/comfort treatment, no artificial nutrition, no IV fluids.  Discussed dehydration management at end-of-life.  The patient requested his sister sign POLST form as he is too shaky to have a clear signature.  All questions answered.      Provided therapeutic communication including open ended " questions, therapeutic silence/touch, and reflective listening throughout encounter. Provided business card with palliative care contact information and encouraged patient and Clau to call with any questions or needs.     Outcome: Patient electing for comfort focused treatment and hospice care.  He would like to go home as soon as hospice is set up on the same service as his wife.  Patient's Sister Clau to call with hospice choice.  POLST form completed.    Plan: Palliative care will remain available for support and questions.    Recommendations: I recommend a hospice consult.    Updated: Dr. Urbano.  Attempted to update RN but could not reach by phone.     Thank you for allowing Palliative Care to participate in this patient's care. Please call our team with questions and/or additional needs.    Total visit time was 50 minutes discussing advance care planning.    Rae Stanley A.P.R.N.  Palliative Care Nurse Practitioner  [phone]

## 2019-09-08 NOTE — PROGRESS NOTES
Assumed care of patient. Pt is A+O x4, forgetful. No chest pain or SOB. No active bleeding noted. Informed of safety and call system. rhythm is ST.

## 2019-09-08 NOTE — CONSULTS
DATE OF SERVICE:  09/08/2019    ADMITTING PHYSICIAN:  Duke Stephens MD    REQUESTING PHYSICIAN:  Darci Urbano MD    CONSULTING PHYSICIAN:  Callie Dupont MD    REASON FOR CONSULTATION:  Patient with dedifferentiated liposarcoma, being   admitted to the hospital due to multiple medical issues, mainly with a GI   bleed.    HISTORY OF PRESENT ILLNESS:  The patient is a 64-year-old male who has   multiple other medical problems, history of prostate cancer status post   radiation, antiphospholipid antibody syndrome on anticoagulation, recent   diagnosis of congestive heart failure with ejection fraction dropped to 15%   due to dilated cardiomyopathy and with a newly diagnosed dedifferentiated   liposarcoma, presented to the hospital with profound anemia and severe rectal   bleeding.  Due to his cardiac status, GI has evaluated him and he is at high   risk of endoscopy, so no interventional procedures were done.  The patient   continues to remain fatigued.  His hemoglobin has been hovering around 7.5   range.  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  He was given Gleevec due to   intolerance with severe diarrhea and tremor.  His treatment was discontinued   on 07/14/2019 and then he was switched to Sutent on 08/03/2019.  He received   about 2 doses and came in to the hospital with severe fatigue and his ejection   fraction was dropped from 55% to 15%.  He was also seen by [facility] team, Dr. Francois, prior to starting his initial treatment for GIST.  Recently, he was   seen at [facility] on 08/30 and I did receive a call from Dr. Francois that the   pathology was again re-reviewed, it came back as dedifferentiated liposarcoma   and she advised to consider systemic chemotherapy, gemcitabine and docetaxel.    The patient presented to my office on 09/03/2019 in a wheelchair, significant   decline in functional status, and his hemoglobin was  8.1.  We have not   started his treatment and within 24 hours, he noticed black colored stools and   worsening symptoms and he ended up in the hospital.  The patient at the   present time still remains fatigued and has generalized malaise.  His   hemoglobin is around 7.6.  He reports no black colored stools during   hospitalization.  No chest pain or palpitations.    PAST MEDICAL HISTORY:  Carcinoma of the prostate status post radiation,   seizures, macular degeneration, congestive heart failure with ejection   fraction 15%, dilated cardiomyopathy, benign essential tremors, history of   TIA, CVA, and a dedifferentiated tumor.    PAST SURGICAL HISTORY:  Biopsy of the GIST tumor in 06/2019, mitral valve   repair, cholecystectomy, and hernia repair.    ALLERGIES:  No known drug allergies.    MEDICATIONS:  Currently, he is on omeprazole, Questran, Vimpat, Keppra,   Zofran, oxycodone, and acetaminophen.    SOCIAL HISTORY:  The patient is an ex-smoker, , lives with his wife.    His wife also has end-stage uterine cancer and currently on hospice.  He   drinks socially.  No drugs.    FAMILY HISTORY:  Noncontributory.    REVIEW OF SYSTEMS:  I did do a 10-point system review, which is negative   except as mentioned above.  CONSTITUTIONAL:  Positive fatigue, positive malaise.  No fevers, chills or   night sweats.  RESPIRATORY:  Positive dyspnea on exertion.  No cough.  CARDIOVASCULAR:  No chest pain or palpitations.  GASTROINTESTINAL:  Positive abdominal discomfort, black colored stools, rectal   bleeding.  No nausea or vomiting.  PSYCHIATRY:  Positive anxiety, situational.  MUSCULOSKELETAL:  No myalgias or arthralgias.    PHYSICAL EXAMINATION:  GENERAL:  He is alert, oriented x3.  He looks very frail.  VITAL SIGNS:  Temperature is 36.2, pulse was 112, respirations 16, blood   pressure 105/67, on 2 liters saturating 95%.  HEENT:  Pupils are equal, reactive to light.  Extraocular muscles are intact.    Anicteric.  CHEST:   Clear to auscultation bilaterally, symmetrical.  CARDIAC:  S1, S2 heard.  Tachycardic, sinus tachycardia.  ABDOMEN:  Soft, nondistended.  Positive bowel sounds.  EXTREMITIES:  Positive edema bilaterally.  No cyanosis or clubbing.  PSYCHIATRIC:  Positive anxious mood.    LABORATORY DATA:  WBC count is 8.0, hemoglobin 7.6, platelet count is 239.    Sodium 137, potassium 4.0, BUN was 14, creatinine was 0.7, calcium was 7.6,   AST 21, ALT 10, alkaline phosphatase 119, total bilirubin 1.2, albumin was   2.0, total protein 4.7.  Serum iron 19, TIBC 158, percent saturation 12.  His   ejection fraction was 15%.    ASSESSMENT AND PLAN:  The patient is a 64-year-old male who was diagnosed with   a dedifferentiated liposarcoma, dilated cardiomyopathy with ejection fraction   of 15%, recurrent congestive heart failure and other comorbidities as   outlined above, presented to the hospital with a gastrointestinal bleed and due to his co-morbidities high risk to do endoscopic evaluation .  At the present time, hemoglobin has been stable but remains in 7.0 range , but I had a long discussion with the patient, explained to him that due to his other comorbidities and decline   in functional status to ECOG performance status of 3-4 and now with severe anemia related to GI bleed giving chemotherapy Gemzar and Docetaxel will cause more toxicities than benefit in terms of cytopenias , bleeding, infections.  We have discussed in length regarding his prognosis and side effects of the treatment.  After going over   all the information, the patient would like to be kept comfortable.  .  During this conversation, his   sister and the palliative nurse Mariya , was at bedside.  We will respect the   patient's wishes.  Also his wife has terminal stage uterine cancer and is enrolled in hospice and they would like to work with the same team and has friends that are helping too.     Please call if any questions .         ____________________________________     MD SR Jaziel Bowman    DD:  09/08/2019 09:31:38  DT:  09/08/2019 09:48:13    D#:  [id number]  Job#:  [id number]

## 2019-09-08 NOTE — DISCHARGE SUMMARY
Discharge Summary    CHIEF COMPLAINT ON ADMISSION  Chief Complaint   Patient presents with   • Bloody Stools     pt has sarcoma; pt reports that he has blood bright red in stool this morning; saturday morning scheduled infusion   • Diarrhea     last 2 wks; dark stool       Reason for Admission  rectal bleeding     Admission Date  9/5/2019    CODE STATUS  Comfort Care/DNR    HPI & HOSPITAL COURSE  This is a 64 y.o. male here with history of mesenteric stromal tumor for which he follows up with Dr. Dupont, and history of nonischemic cardiomyopathy last EF 15% as well as mitral valve repair and antiphospholipid syndrome has been maintained on anticoagulation with warfarin presented with recurrent episodes of hematochezia.   Upon arrival to the emergency room his blood pressure was 120/63, pulse 98.  WBC was 7.5, hemoglobin 7.2.  He was started on IV fluids and given 1 unit of transfusion. CT scan was completed in the ER that found his mass to be increasing in size in the mesentery. GI evaluated patient and determined that he is not a candidate for endoscopy.  GI recommended palliative consult.  Patient's hemodynamics and hemoglobin has been stable throughout his hospital stay.  Oncology evaluated patient and had a long discussion with him.  Patient agreed for hospice and comfort care measures.    Therefore, he is discharged in guarded and stable condition to hospice.    The patient met 2-midnight criteria for an inpatient stay at the time of discharge.    Discharge Date  9/8/2019    FOLLOW UP ITEMS POST DISCHARGE  To hospice     DISCHARGE DIAGNOSES  Principal Problem:    GI bleed POA: Unknown  Active Problems:    Seizure (HCC) POA: Yes    Antiphospholipid syndrome (HCC) POA: Yes    Chronic systolic congestive heart failure, NYHA class 2 (HCC) POA: Yes    S/P mitral valve repair (Chronic) POA: Yes    Gastrointestinal stromal tumor (GIST) (HCC) POA: Yes      Overview: Dr. Dupont, biopsy proven 4/29/2019    Anemia  associated with acute blood loss POA: Unknown    Diarrhea POA: Unknown    Hypotension due to hypovolemia POA: Unknown    Elevated troponin POA: Unknown  Resolved Problems:    * No resolved hospital problems. *      FOLLOW UP  No future appointments.  No follow-up provider specified.    MEDICATIONS ON DISCHARGE     Medication List      CONTINUE taking these medications      Instructions   carvedilol 12.5 MG Tabs  Commonly known as:  COREG   Take 1 Tab by mouth 2 times a day, with meals.  Dose:  12.5 mg     lacosamide 200 MG Tabs tablet  Commonly known as:  VIMPAT   Take 200 mg by mouth 2 Times a Day.  Dose:  200 mg     levetiracetam 1000 MG tablet  Commonly known as:  KEPPRA   Take 2 Tabs by mouth 2 Times a Day.  Dose:  2,000 mg     lisinopril 2.5 MG Tabs  Commonly known as:  PRINIVIL   Take 1 Tab by mouth every day.  Dose:  2.5 mg     multivitamin Tabs   Take 1 Tab by mouth every day.  Dose:  1 Tab     potassium chloride SA 10 MEQ Tbcr  Commonly known as:  K-DUR   Take 1 Tab by mouth every day.  Dose:  10 mEq     senna-docusate 8.6-50 MG Tabs  Commonly known as:  PERICOLACE or SENOKOT S   Take 1-2 Tabs by mouth 1 time daily as needed.  Dose:  1-2 Tab     spironolactone 25 MG Tabs  Commonly known as:  ALDACTONE   Take 0.5 Tabs by mouth every day.  Dose:  12.5 mg     torsemide 5 MG Tabs  Commonly known as:  DEMADEX   Take 1 Tab by mouth every day.  Dose:  5 mg        STOP taking these medications    warfarin 1 MG Tabs  Commonly known as:  COUMADIN            Allergies  No Known Allergies    DIET  Orders Placed This Encounter   Procedures   • Diet Order Cardiac     Standing Status:   Standing     Number of Occurrences:   1     Order Specific Question:   Diet:     Answer:   Cardiac [6]       ACTIVITY  As tolerated.  Weight bearing as tolerated    CONSULTATIONS  Oncology  GI    PROCEDURES  None    LABORATORY  Lab Results   Component Value Date    SODIUM 137 09/08/2019    POTASSIUM 4.0 09/08/2019    CHLORIDE 109  09/08/2019    CO2 19 (L) 09/08/2019    GLUCOSE 100 (H) 09/08/2019    BUN 14 09/08/2019    CREATININE 0.77 09/08/2019    CREATININE 1.0 08/28/2008        Lab Results   Component Value Date    WBC 8.0 09/08/2019    HEMOGLOBIN 7.6 (L) 09/08/2019    HEMATOCRIT 24.1 (L) 09/08/2019    PLATELETCT 239 09/08/2019        Total time of the discharge process exceeds 40 minutes.

## 2019-09-08 NOTE — CARE PLAN
Problem: Discharge Barriers/Planning  Goal: Patient's continuum of care needs will be met  Outcome: NOT MET  Note:   Patient is experiencing delay in scheduled chemotherapy treatments and awaiting lab levels to discuss potential discharge. Will continue to inquire about how to meet patient's needs.      Problem: Bowel/Gastric:  Goal: Normal bowel function is maintained or improved  Outcome: PROGRESSING AS EXPECTED  Note:   Patient is currently experiencing solid bowel movements. Returning to baseline.      Problem: Knowledge Deficit  Goal: Knowledge of disease process/condition, treatment plan, diagnostic tests, and medications will improve  Intervention: Explain information regarding disease process/condition, treatment plan, diagnostic tests, and medications and document in education  Note:   Patient states understanding or iron and needing test dose to assess for potential reaction. No reaction noted and completed iron infusion to potentially increase patient's lab levels.

## 2019-09-11 LAB
O+P STL MICRO: NEGATIVE
O+P STL TRI STN: NEGATIVE

## 2019-11-13 ENCOUNTER — ANTICOAGULATION MONITORING (OUTPATIENT)
Dept: VASCULAR LAB | Facility: MEDICAL CENTER | Age: 64
End: 2019-11-13

## 2019-11-13 DIAGNOSIS — I63.9 CEREBROVASCULAR ACCIDENT (CVA), UNSPECIFIED MECHANISM (HCC): ICD-10-CM

## 2019-11-13 DIAGNOSIS — Z98.890 S/P MITRAL VALVE REPAIR: ICD-10-CM

## 2019-11-13 NOTE — PROGRESS NOTES
Discharged from [facility].  Anticoagulation discontinued at discharge  Clau Carrera, Clinical Pharmacist, CDE, CACP

## 2021-03-03 DIAGNOSIS — Z23 NEED FOR VACCINATION: ICD-10-CM

## 2022-06-25 NOTE — PROGRESS NOTES
Pt A&Ox3-4. Intermitted confusion.  Denies pain.  Up 1PA to bathroom.  Minimal diarrhea today.  Imodium and questran started.  Cardiac diet.  NS@50ml/hr  Discharge pending labs and stool studies.     ,DirectAddress_Unknown 36.6

## 2022-12-13 ENCOUNTER — APPOINTMENT (OUTPATIENT)
Dept: URBAN - METROPOLITAN AREA CLINIC 317 | Age: 67
Setting detail: DERMATOLOGY
End: 2022-12-13

## 2022-12-13 DIAGNOSIS — L81.4 OTHER MELANIN HYPERPIGMENTATION: ICD-10-CM

## 2022-12-13 DIAGNOSIS — L82.1 OTHER SEBORRHEIC KERATOSIS: ICD-10-CM

## 2022-12-13 DIAGNOSIS — L72.8 OTHER FOLLICULAR CYSTS OF THE SKIN AND SUBCUTANEOUS TISSUE: ICD-10-CM

## 2022-12-13 DIAGNOSIS — L57.8 OTHER SKIN CHANGES DUE TO CHRONIC EXPOSURE TO NONIONIZING RADIATION: ICD-10-CM

## 2022-12-13 DIAGNOSIS — L30.1 DYSHIDROSIS [POMPHOLYX]: ICD-10-CM

## 2022-12-13 PROCEDURE — OTHER COUNSELING: OTHER

## 2022-12-13 PROCEDURE — OTHER SUNSCREEN RECOMMENDATIONS: OTHER

## 2022-12-13 PROCEDURE — OTHER MIPS QUALITY: OTHER

## 2022-12-13 PROCEDURE — OTHER REASSURANCE: OTHER

## 2022-12-13 PROCEDURE — 99214 OFFICE O/P EST MOD 30 MIN: CPT

## 2022-12-13 PROCEDURE — OTHER PRESCRIPTION: OTHER

## 2022-12-13 PROCEDURE — OTHER PRESCRIPTION MEDICATION MANAGEMENT: OTHER

## 2022-12-13 RX ORDER — CLOBETASOL PROPIONATE 0.5 MG/G
CREAM TOPICAL BID
Qty: 60 | Refills: 1 | Status: ERX | COMMUNITY
Start: 2022-12-13

## 2022-12-13 RX ORDER — AMMONIUM LACTATE 120 MG/G
CREAM TOPICAL
Qty: 385 | Refills: 0 | Status: CANCELLED | COMMUNITY
Start: 2022-12-13

## 2022-12-13 RX ORDER — TRIAMCINOLONE ACETONIDE 1 MG/G
CREAM TOPICAL BID
Qty: 30 | Refills: 3 | Status: ERX | COMMUNITY
Start: 2022-12-13

## 2022-12-13 ASSESSMENT — LOCATION DETAILED DESCRIPTION DERM
LOCATION DETAILED: LEFT PROXIMAL DORSAL FOREARM
LOCATION DETAILED: SUPERIOR THORACIC SPINE
LOCATION DETAILED: RIGHT PROXIMAL DORSAL FOREARM
LOCATION DETAILED: LEFT POSTERIOR NECK
LOCATION DETAILED: LEFT INFERIOR MEDIAL UPPER BACK

## 2022-12-13 ASSESSMENT — LOCATION SIMPLE DESCRIPTION DERM
LOCATION SIMPLE: POSTERIOR NECK
LOCATION SIMPLE: LEFT UPPER BACK
LOCATION SIMPLE: UPPER BACK
LOCATION SIMPLE: RIGHT FOREARM
LOCATION SIMPLE: LEFT FOREARM

## 2022-12-13 ASSESSMENT — LOCATION ZONE DERM
LOCATION ZONE: NECK
LOCATION ZONE: TRUNK
LOCATION ZONE: ARM

## 2022-12-13 NOTE — PROCEDURE: PRESCRIPTION MEDICATION MANAGEMENT
Render In Strict Bullet Format?: No
Detail Level: Zone
Continue Regimen: clobetasol 0.05 % topical cream QD-BID for no more than 2 weeks when flaring \\nCerave cream BID
Plan: Call if no better in 2 weeks
Initiate Treatment: triamcinolone acetonide 0.1 % topical cream BID\\nApply QD BID for no more than two weeks \\nCerave cream BID
Discontinue Regimen: Hold ammonium lactate 12 % topical cream

## 2023-01-03 ENCOUNTER — RX ONLY (RX ONLY)
Age: 68
End: 2023-01-03

## 2023-01-03 RX ORDER — HALOBETASOL PROPIONATE 0.5 MG/G
CREAM TOPICAL
Qty: 50 | Refills: 1 | Status: ERX | COMMUNITY
Start: 2023-01-03

## 2023-06-20 ENCOUNTER — RX ONLY (RX ONLY)
Age: 68
End: 2023-06-20

## 2023-06-20 ENCOUNTER — APPOINTMENT (OUTPATIENT)
Dept: URBAN - METROPOLITAN AREA CLINIC 317 | Age: 68
Setting detail: DERMATOLOGY
End: 2023-06-20

## 2023-06-20 DIAGNOSIS — L72.8 OTHER FOLLICULAR CYSTS OF THE SKIN AND SUBCUTANEOUS TISSUE: ICD-10-CM

## 2023-06-20 DIAGNOSIS — L30.9 DERMATITIS, UNSPECIFIED: ICD-10-CM

## 2023-06-20 DIAGNOSIS — L81.4 OTHER MELANIN HYPERPIGMENTATION: ICD-10-CM

## 2023-06-20 DIAGNOSIS — D18.0 HEMANGIOMA: ICD-10-CM

## 2023-06-20 DIAGNOSIS — L73.8 OTHER SPECIFIED FOLLICULAR DISORDERS: ICD-10-CM

## 2023-06-20 DIAGNOSIS — L82.1 OTHER SEBORRHEIC KERATOSIS: ICD-10-CM

## 2023-06-20 PROBLEM — D18.01 HEMANGIOMA OF SKIN AND SUBCUTANEOUS TISSUE: Status: ACTIVE | Noted: 2023-06-20

## 2023-06-20 PROCEDURE — OTHER PRESCRIPTION MEDICATION MANAGEMENT: OTHER

## 2023-06-20 PROCEDURE — OTHER MIPS QUALITY: OTHER

## 2023-06-20 PROCEDURE — OTHER PRESCRIPTION: OTHER

## 2023-06-20 PROCEDURE — 99214 OFFICE O/P EST MOD 30 MIN: CPT

## 2023-06-20 PROCEDURE — OTHER SUNSCREEN RECOMMENDATIONS: OTHER

## 2023-06-20 PROCEDURE — OTHER REASSURANCE: OTHER

## 2023-06-20 PROCEDURE — OTHER COUNSELING: OTHER

## 2023-06-20 RX ORDER — TRIAMCINOLONE ACETONIDE 1 MG/G
CREAM TOPICAL
Qty: 45 | Refills: 2 | Status: CANCELLED

## 2023-06-20 RX ORDER — TRIAMCINOLONE ACETONIDE 1 MG/G
CREAM TOPICAL
Qty: 45 | Refills: 2 | Status: ERX

## 2023-06-20 RX ORDER — HALOBETASOL PROPIONATE 0.5 MG/G
CREAM TOPICAL
Qty: 50 | Refills: 1 | Status: ERX | COMMUNITY
Start: 2023-06-20

## 2023-06-20 ASSESSMENT — LOCATION DETAILED DESCRIPTION DERM
LOCATION DETAILED: LEFT LATERAL PROXIMAL PRETIBIAL REGION
LOCATION DETAILED: LEFT CENTRAL FRONTAL SCALP
LOCATION DETAILED: LEFT INFERIOR MEDIAL UPPER BACK
LOCATION DETAILED: RIGHT SUPERIOR UPPER BACK
LOCATION DETAILED: LEFT INFERIOR CENTRAL MALAR CHEEK
LOCATION DETAILED: LEFT LATERAL ABDOMEN
LOCATION DETAILED: XIPHOID

## 2023-06-20 ASSESSMENT — LOCATION SIMPLE DESCRIPTION DERM
LOCATION SIMPLE: RIGHT UPPER BACK
LOCATION SIMPLE: ABDOMEN
LOCATION SIMPLE: SCALP
LOCATION SIMPLE: LEFT PRETIBIAL REGION
LOCATION SIMPLE: LEFT CHEEK
LOCATION SIMPLE: LEFT UPPER BACK

## 2023-06-20 ASSESSMENT — LOCATION ZONE DERM
LOCATION ZONE: LEG
LOCATION ZONE: TRUNK
LOCATION ZONE: FACE
LOCATION ZONE: SCALP

## 2023-06-20 NOTE — HPI: TOTAL BODY SKIN EXAM (PATIENT REPORTED)
Do You Have Any Concerning Skin Lesions Today?  If No, You Do Not Have To Fill Out The Remainder Of The Questions.: yes
Where Is Your Skin Lesion(S) Of Concern Located?: Left cheek, left scalp, left knee, and left flank

## 2023-06-20 NOTE — PROCEDURE: PRESCRIPTION MEDICATION MANAGEMENT
Detail Level: Zone
Continue Regimen: triamcinolone acetonide 0.1 % topical cream- Apply QD-BID for no more than two weeks in a row during minor flares \\nHalobetasol 0.05 % topical cream- QD-BID for no more than 2 weeks during major flares\\nCerave cream BID
Render In Strict Bullet Format?: No

## 2023-08-15 ENCOUNTER — APPOINTMENT (OUTPATIENT)
Dept: URBAN - METROPOLITAN AREA CLINIC 317 | Age: 68
Setting detail: DERMATOLOGY
End: 2023-08-15

## 2023-08-15 DIAGNOSIS — L30.9 DERMATITIS, UNSPECIFIED: ICD-10-CM

## 2023-08-15 PROCEDURE — OTHER PRESCRIPTION: OTHER

## 2023-08-15 PROCEDURE — 99214 OFFICE O/P EST MOD 30 MIN: CPT

## 2023-08-15 PROCEDURE — OTHER COUNSELING: OTHER

## 2023-08-15 PROCEDURE — OTHER PRESCRIPTION MEDICATION MANAGEMENT: OTHER

## 2023-08-15 PROCEDURE — OTHER MIPS QUALITY: OTHER

## 2023-08-15 RX ORDER — HALOBETASOL PROPIONATE 0.5 MG/G
CREAM TOPICAL
Qty: 50 | Refills: 1 | Status: ERX | COMMUNITY
Start: 2023-08-15

## 2023-08-15 RX ORDER — CLOBETASOL PROPIONATE 0.5 MG/G
CREAM TOPICAL BID
Qty: 60 | Refills: 3 | Status: ERX | COMMUNITY
Start: 2023-08-15

## 2023-08-15 ASSESSMENT — LOCATION SIMPLE DESCRIPTION DERM
LOCATION SIMPLE: LEFT HAND
LOCATION SIMPLE: RIGHT HAND

## 2023-08-15 ASSESSMENT — LOCATION DETAILED DESCRIPTION DERM
LOCATION DETAILED: RIGHT ULNAR DORSAL HAND
LOCATION DETAILED: LEFT ULNAR DORSAL HAND

## 2023-08-15 ASSESSMENT — LOCATION ZONE DERM: LOCATION ZONE: HAND

## 2023-08-15 ASSESSMENT — SEVERITY ASSESSMENT: SEVERITY: MODERATE

## 2023-08-15 ASSESSMENT — BSA RASH: BSA RASH: 2

## 2023-08-15 NOTE — PROCEDURE: MIPS QUALITY
Quality 138: Melanoma: Coordination Of Care: The patient does not have a PCP or referring physician.
Quality 130: Documentation Of Current Medications In The Medical Record: Current Medications Documented
Detail Level: Detailed
Quality 47: Advance Care Plan: Advance care planning not documented, reason not otherwise specified.
Quality 431: Preventive Care And Screening: Unhealthy Alcohol Use - Screening: Patient not identified as an unhealthy alcohol user when screened for unhealthy alcohol use using a systematic screening method
Quality 137: Melanoma: Continuity Of Care - Recall System: Recall system not utilized, reason not otherwise specified
Quality 226: Preventive Care And Screening: Tobacco Use: Screening And Cessation Intervention: Patient screened for tobacco use and is an ex/non-smoker

## 2023-08-15 NOTE — PROCEDURE: PRESCRIPTION MEDICATION MANAGEMENT
Detail Level: Zone
Render In Strict Bullet Format?: No
Initiate Treatment: Clobetasol 0.05% topical cream QD/BID x 2 weeks for major flares. Informed patient to rotate medications in fall months
Continue Regimen: Halobetasol 0.05% topical cream QD/BID x 2 weeks for major flares. Informed patient to rotate medications in fall months to minimize tolerance\\n\\nTriamcinolone 0.1% topical cream QD/BID x 2 weeks for minor flares.\\nCerave cream QD-BID

## 2023-12-26 ENCOUNTER — APPOINTMENT (OUTPATIENT)
Dept: URBAN - METROPOLITAN AREA CLINIC 317 | Age: 68
Setting detail: DERMATOLOGY
End: 2023-12-26

## 2024-03-07 ENCOUNTER — APPOINTMENT (OUTPATIENT)
Dept: URBAN - METROPOLITAN AREA CLINIC 317 | Age: 69
Setting detail: DERMATOLOGY
End: 2024-03-07

## 2024-03-07 DIAGNOSIS — L30.9 DERMATITIS, UNSPECIFIED: ICD-10-CM

## 2024-03-07 DIAGNOSIS — L81.4 OTHER MELANIN HYPERPIGMENTATION: ICD-10-CM

## 2024-03-07 DIAGNOSIS — L72.8 OTHER FOLLICULAR CYSTS OF THE SKIN AND SUBCUTANEOUS TISSUE: ICD-10-CM

## 2024-03-07 DIAGNOSIS — D18.0 HEMANGIOMA: ICD-10-CM

## 2024-03-07 DIAGNOSIS — L82.1 OTHER SEBORRHEIC KERATOSIS: ICD-10-CM

## 2024-03-07 DIAGNOSIS — L663 OTHER SPECIFIED DISEASES OF HAIR AND HAIR FOLLICLES: ICD-10-CM

## 2024-03-07 DIAGNOSIS — D49.2 NEOPLASM OF UNSPECIFIED BEHAVIOR OF BONE, SOFT TISSUE, AND SKIN: ICD-10-CM

## 2024-03-07 DIAGNOSIS — L91.8 OTHER HYPERTROPHIC DISORDERS OF THE SKIN: ICD-10-CM

## 2024-03-07 DIAGNOSIS — Z71.89 OTHER SPECIFIED COUNSELING: ICD-10-CM

## 2024-03-07 DIAGNOSIS — L738 OTHER SPECIFIED DISEASES OF HAIR AND HAIR FOLLICLES: ICD-10-CM

## 2024-03-07 PROBLEM — D23.71 OTHER BENIGN NEOPLASM OF SKIN OF RIGHT LOWER LIMB, INCLUDING HIP: Status: ACTIVE | Noted: 2024-03-07

## 2024-03-07 PROBLEM — L02.425 FURUNCLE OF RIGHT LOWER LIMB: Status: ACTIVE | Noted: 2024-03-07

## 2024-03-07 PROBLEM — D18.01 HEMANGIOMA OF SKIN AND SUBCUTANEOUS TISSUE: Status: ACTIVE | Noted: 2024-03-07

## 2024-03-07 PROCEDURE — 11301 SHAVE SKIN LESION 0.6-1.0 CM: CPT

## 2024-03-07 PROCEDURE — 99214 OFFICE O/P EST MOD 30 MIN: CPT | Mod: 25

## 2024-03-07 PROCEDURE — OTHER MIPS QUALITY: OTHER

## 2024-03-07 PROCEDURE — OTHER COUNSELING: OTHER

## 2024-03-07 PROCEDURE — OTHER PRESCRIPTION MEDICATION MANAGEMENT: OTHER

## 2024-03-07 PROCEDURE — OTHER SHAVE REMOVAL: OTHER

## 2024-03-07 ASSESSMENT — LOCATION ZONE DERM
LOCATION ZONE: TRUNK
LOCATION ZONE: HAND
LOCATION ZONE: LEG
LOCATION ZONE: NECK

## 2024-03-07 ASSESSMENT — LOCATION SIMPLE DESCRIPTION DERM
LOCATION SIMPLE: LEFT HAND
LOCATION SIMPLE: GROIN
LOCATION SIMPLE: UPPER BACK
LOCATION SIMPLE: ABDOMEN
LOCATION SIMPLE: LEFT LOWER BACK
LOCATION SIMPLE: RIGHT HAND
LOCATION SIMPLE: RIGHT PRETIBIAL REGION
LOCATION SIMPLE: LEFT ANTERIOR NECK
LOCATION SIMPLE: CHEST
LOCATION SIMPLE: LOWER BACK

## 2024-03-07 ASSESSMENT — LOCATION DETAILED DESCRIPTION DERM
LOCATION DETAILED: SUPERIOR THORACIC SPINE
LOCATION DETAILED: UPPER STERNUM
LOCATION DETAILED: LEFT SUPRAPUBIC SKIN
LOCATION DETAILED: EPIGASTRIC SKIN
LOCATION DETAILED: RIGHT ULNAR DORSAL HAND
LOCATION DETAILED: RIGHT PROXIMAL PRETIBIAL REGION
LOCATION DETAILED: LEFT ULNAR DORSAL HAND
LOCATION DETAILED: LEFT INFERIOR LATERAL MIDBACK
LOCATION DETAILED: PERIUMBILICAL SKIN
LOCATION DETAILED: LEFT SUPERIOR LATERAL LOWER BACK
LOCATION DETAILED: SUPERIOR LUMBAR SPINE
LOCATION DETAILED: INFERIOR THORACIC SPINE
LOCATION DETAILED: LEFT CLAVICULAR NECK

## 2024-03-07 NOTE — PROCEDURE: SHAVE REMOVAL
Medical Necessity Information: It is in your best interest to select a reason for this procedure from the list below. All of these items fulfill various CMS LCD requirements except the new and changing color options.
Medical Necessity Clause: This procedure was medically necessary because the lesion that was treated was:
Lab: -[id number]
Lab Facility: 0
Body Location Override (Optional - Billing Will Still Be Based On Selected Body Map Location If Applicable): left lower back
Detail Level: Detailed
Was A Bandage Applied: Yes
Size Of Lesion In Cm (Required): 1
Size Of Margin In Cm (Margins Are Not Added To Billing Dimensions): 0.2
Depth Of Shave: dermis
Biopsy Method: Dermablade
Anesthesia Type: 1% lidocaine with epinephrine
Hemostasis: Drysol
Wound Care: Petrolatum
Render Path Notes In Note?: No
Consent was obtained from the patient. The risks and benefits to therapy were discussed in detail. Specifically, the risks of infection, scarring, bleeding, prolonged wound healing, incomplete removal, allergy to anesthesia, nerve injury and recurrence were addressed. Prior to the procedure, the treatment site was clearly identified and confirmed by the patient. All components of Universal Protocol/PAUSE Rule completed.
Post-Care Instructions: I reviewed with the patient in detail post-care instructions. Patient is to keep the biopsy site dry overnight, and then apply bacitracin twice daily until healed. Patient may apply hydrogen peroxide soaks to remove any crusting.
Notification Instructions: Patient will be notified of pathology results. However, patient instructed to call the office if not contacted within 2 weeks.
Billing Type: Third-Party Bill

## 2024-03-07 NOTE — PROCEDURE: MIPS QUALITY
Quality 137: Melanoma: Continuity Of Care - Recall System: Recall system not utilized, reason not otherwise specified
Detail Level: Detailed
Quality 130: Documentation Of Current Medications In The Medical Record: Current Medications Documented
Quality 431: Preventive Care And Screening: Unhealthy Alcohol Use - Screening: Patient not identified as an unhealthy alcohol user when screened for unhealthy alcohol use using a systematic screening method
Quality 47: Advance Care Plan: Advance care planning not documented, reason not otherwise specified.
Quality 226: Preventive Care And Screening: Tobacco Use: Screening And Cessation Intervention: Patient screened for tobacco use and is an ex/non-smoker
Quality 138: Melanoma: Coordination Of Care: Treatment plan not communicated, reason not otherwise specified.
Quality 110: Preventive Care And Screening: Influenza Immunization: Influenza Immunization previously received during influenza season

## 2024-08-16 ENCOUNTER — APPOINTMENT (OUTPATIENT)
Dept: URBAN - METROPOLITAN AREA CLINIC 317 | Age: 69
Setting detail: DERMATOLOGY
End: 2024-08-16

## 2024-08-16 DIAGNOSIS — L90.5 SCAR CONDITIONS AND FIBROSIS OF SKIN: ICD-10-CM

## 2024-08-16 PROCEDURE — OTHER COUNSELING: OTHER

## 2024-08-16 PROCEDURE — 99212 OFFICE O/P EST SF 10 MIN: CPT

## 2024-08-16 PROCEDURE — OTHER MIPS QUALITY: OTHER

## 2024-08-16 NOTE — HPI: SKIN LESION
What Type Of Note Output Would You Prefer (Optional)?: Bullet Format
How Severe Is Your Skin Lesion?: moderate
Has Your Skin Lesion Been Treated?: not been treated
Is This A New Presentation, Or A Follow-Up?: Skin Lesion
Additional History: Very itchy and linear lesion was just NOTICED a few days ago. Patient wife thought it was a scar but patient said no surgery at this site,

## 2024-08-16 NOTE — PROCEDURE: COUNSELING
Detail Level: Simple
Patient Specific Counseling (Will Not Stick From Patient To Patient): Biopsy site from march with . No recurrence of mild DN

## 2024-09-25 ENCOUNTER — APPOINTMENT (OUTPATIENT)
Dept: URBAN - METROPOLITAN AREA CLINIC 317 | Age: 69
Setting detail: DERMATOLOGY
End: 2024-09-25

## 2024-09-25 DIAGNOSIS — D18.0 HEMANGIOMA: ICD-10-CM

## 2024-09-25 DIAGNOSIS — L81.4 OTHER MELANIN HYPERPIGMENTATION: ICD-10-CM

## 2024-09-25 DIAGNOSIS — Z71.89 OTHER SPECIFIED COUNSELING: ICD-10-CM

## 2024-09-25 DIAGNOSIS — L72.8 OTHER FOLLICULAR CYSTS OF THE SKIN AND SUBCUTANEOUS TISSUE: ICD-10-CM

## 2024-09-25 DIAGNOSIS — L30.9 DERMATITIS, UNSPECIFIED: ICD-10-CM

## 2024-09-25 DIAGNOSIS — L82.1 OTHER SEBORRHEIC KERATOSIS: ICD-10-CM

## 2024-09-25 DIAGNOSIS — L25.9 UNSPECIFIED CONTACT DERMATITIS, UNSPECIFIED CAUSE: ICD-10-CM

## 2024-09-25 PROBLEM — D18.01 HEMANGIOMA OF SKIN AND SUBCUTANEOUS TISSUE: Status: ACTIVE | Noted: 2024-09-25

## 2024-09-25 PROCEDURE — 99214 OFFICE O/P EST MOD 30 MIN: CPT

## 2024-09-25 PROCEDURE — OTHER COUNSELING: OTHER

## 2024-09-25 PROCEDURE — OTHER MIPS QUALITY: OTHER

## 2024-09-25 PROCEDURE — OTHER PRESCRIPTION MEDICATION MANAGEMENT: OTHER

## 2024-09-25 PROCEDURE — OTHER PRESCRIPTION: OTHER

## 2024-09-25 RX ORDER — DOXYCYCLINE HYCLATE 100 MG/1
CAPSULE, GELATIN COATED ORAL
Qty: 20 | Refills: 0 | Status: ERX | COMMUNITY
Start: 2024-09-25

## 2024-09-25 RX ORDER — MUPIROCIN 20 MG/G
OINTMENT TOPICAL
Qty: 30 | Refills: 0 | Status: ERX | COMMUNITY
Start: 2024-09-25

## 2024-09-25 RX ORDER — HYDROCORTISONE 2.5 %
CREAM (GRAM) TOPICAL
Qty: 30 | Refills: 0 | Status: ERX | COMMUNITY
Start: 2024-09-25

## 2024-09-25 ASSESSMENT — LOCATION DETAILED DESCRIPTION DERM
LOCATION DETAILED: INFERIOR THORACIC SPINE
LOCATION DETAILED: MID POSTERIOR NECK
LOCATION DETAILED: EPIGASTRIC SKIN
LOCATION DETAILED: PERIANAL SKIN

## 2024-09-25 ASSESSMENT — LOCATION SIMPLE DESCRIPTION DERM
LOCATION SIMPLE: UPPER BACK
LOCATION SIMPLE: PERIANAL SKIN
LOCATION SIMPLE: POSTERIOR NECK
LOCATION SIMPLE: ABDOMEN

## 2024-09-25 ASSESSMENT — LOCATION ZONE DERM
LOCATION ZONE: ANUS
LOCATION ZONE: NECK
LOCATION ZONE: TRUNK

## 2024-09-25 NOTE — PROCEDURE: PRESCRIPTION MEDICATION MANAGEMENT
Render In Strict Bullet Format?: No
Detail Level: Zone
Initiate Treatment: doxycycline hyclate 100 mg capsule \\nBID x5 days\\n\\nmupirocin 2 % topical ointment \\nBID until clear\\n\\nhydrocortisone 2.5 % topical cream \\nBID for 14 days with a one week break
Plan: Consider Allergist Delfina Mcdaniel given chronicity for allergy patch testing
Continue Regimen: Halobetasol 0.05% topical cream QD/BID x 2 weeks for major flares\\nTriamcinolone 0.1% topical cream QD/BID x 2 weeks for minor flares.\\nCerave cream QD-BID
Initiate Treatment: doxycycline hyclate 100 mg capsule \\nBID [id number]
Plan: Consider intralesional kenalog or I&D if progressive.

## 2024-09-25 NOTE — PROCEDURE: MIPS QUALITY
Quality 137: Melanoma: Continuity Of Care - Recall System: Recall system not utilized, reason not otherwise specified
Detail Level: Detailed
Quality 110: Preventive Care And Screening: Influenza Immunization: Influenza Immunization previously received during influenza season
Quality 431: Preventive Care And Screening: Unhealthy Alcohol Use - Screening: Patient not identified as an unhealthy alcohol user when screened for unhealthy alcohol use using a systematic screening method
Quality 111:Pneumonia Vaccination Status For Older Adults: Patient received any pneumococcal conjugate or polysaccharide vaccine on or after their 60th birthday and before the end of the measurement period
Quality 47: Advance Care Plan: Advance care planning not documented, reason not otherwise specified.
Quality 226: Preventive Care And Screening: Tobacco Use: Screening And Cessation Intervention: Patient screened for tobacco use and is an ex/non-smoker
Quality 130: Documentation Of Current Medications In The Medical Record: Current Medications Documented
Quality 138: Melanoma: Coordination Of Care: Treatment plan not communicated, reason not otherwise specified.

## 2024-10-12 NOTE — TELEPHONE ENCOUNTER
Hospital follow-up for the schedulers   Received: 4 days ago   Message Contents   Poly Alarcon M.D.  Erica Meza R.N. Cc: Dudley Warner M.D.             Please have patient seen in heart failure clinic in approximately 2 weeks, he is still currently hospitalized.     FYI to Dr. Warner, patient with recurrence of cardiomyopathy, 15 to 20% this admission, in the setting of Sutent chemotherapy which has been discontinued.  This is complicated by the fact that his wife is at home with terminal ovarian cancer.  When I saw him in the hospital, he was not a good place.  I thought short-term follow-up with heart failure clinic would be best.     He will need reevaluation for ICD in 3 months by echo.  Apparently his cancer is slow-growing enough that his life expectancy would be greater than 1 year.     Let me know if you have any questions.   Thank you          Task to schedulers   Admission